# Patient Record
Sex: FEMALE | Race: WHITE | NOT HISPANIC OR LATINO | Employment: FULL TIME | URBAN - METROPOLITAN AREA
[De-identification: names, ages, dates, MRNs, and addresses within clinical notes are randomized per-mention and may not be internally consistent; named-entity substitution may affect disease eponyms.]

---
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
## 2020-01-02 ENCOUNTER — NURSE TRIAGE (OUTPATIENT)
Dept: NURSING | Facility: CLINIC | Age: 20
End: 2020-01-02

## 2020-01-02 NOTE — TELEPHONE ENCOUNTER
Girish asks if she can have her Implanon birth control implant replaced sooner than March 2019. She had it inserted in March 2017.    No current health concerns, states that she has more time in January or February to have implant replaced. [facility] advised her to call clinic during regular hours, and she verbalized understanding.    Vanessa Gaming RN/[facility] Nurse Advisor      Reason for Disposition    Wants to get birth control implant removed    Additional Information    Negative: [1] SEVERE pain (e.g., excruciating) AND [2] present > 1 hour    Negative: [1] Pregnant AND [2] MODERATE-SEVERE abdominal pain    Negative: [1] Pregnant AND [2] MODERATE-SEVERE vaginal bleeding    Negative: SEVERE vaginal bleeding (i.e., soaking 2 pads or tampons per hour and present 2 or more hours)    Negative: Patient sounds very sick or weak to the triager    Negative: [1] Constant abdominal pain AND [2] present > 2 hours    Negative: [1] Pregnant AND [2] MILD symptoms (e.g., vaginal spotting, mild abdominal cramping)    Negative: Caller has urgent question and triager unable to answer question    Negative: MODERATE vaginal bleeding (i.e., soaking 1 pad or tampon per hour and present > 6 hours)    Negative: Implant site looks infected (spreading redness, red streak, or pus)    Negative: Caller has NON-URGENT question and triager unable to answer question    Negative: Implant looks like it is coming out    Negative: [1] Pregnant AND [2] NO vaginal bleeding or abdominal pain    Negative: [1] Vaginal bleeding with > 6 soaked pads or tampons per day AND [2] lasts > 7 days    Negative: Caller is concerned about a sexually transmitted infection (STI)    Negative: [1] Vaginal bleeding with > 6 soaked pads or tampons per day BUT [2] lasts 7 days or less    Negative: Vaginal bleeding lasts > 7 days    Negative: [1] Has an Implanon or Nexplanon implant AND [2] more than 3 years since it was placed    Negative: [1] Has a Jadelle or Norplant  implant AND [2] more than 5 years since it was placed    Negative: Can't feel implant under the skin    Protocols used: CONTRACEPTION - IMPLANT-P-AH

## 2020-02-21 ENCOUNTER — OFFICE VISIT (OUTPATIENT)
Dept: FAMILY MEDICINE | Facility: CLINIC | Age: 20
End: 2020-02-21
Payer: COMMERCIAL

## 2020-02-21 VITALS
WEIGHT: 143.8 LBS | HEART RATE: 113 BPM | DIASTOLIC BLOOD PRESSURE: 78 MMHG | SYSTOLIC BLOOD PRESSURE: 144 MMHG | RESPIRATION RATE: 16 BRPM | BODY MASS INDEX: 26.46 KG/M2 | HEIGHT: 62 IN | TEMPERATURE: 98.9 F | OXYGEN SATURATION: 99 %

## 2020-02-21 DIAGNOSIS — Z30.46 SURVEILLANCE OF PREVIOUSLY PRESCRIBED IMPLANTABLE SUBDERMAL CONTRACEPTIVE: Primary | ICD-10-CM

## 2020-02-21 DIAGNOSIS — Z11.3 SCREEN FOR STD (SEXUALLY TRANSMITTED DISEASE): ICD-10-CM

## 2020-02-21 PROCEDURE — 87491 CHLMYD TRACH DNA AMP PROBE: CPT | Performed by: FAMILY MEDICINE

## 2020-02-21 PROCEDURE — 11983 REMOVE/INSERT DRUG IMPLANT: CPT | Performed by: FAMILY MEDICINE

## 2020-02-21 PROCEDURE — 87591 N.GONORRHOEAE DNA AMP PROB: CPT | Performed by: FAMILY MEDICINE

## 2020-02-21 ASSESSMENT — ANXIETY QUESTIONNAIRES
3. WORRYING TOO MUCH ABOUT DIFFERENT THINGS: NEARLY EVERY DAY
1. FEELING NERVOUS, ANXIOUS, OR ON EDGE: NEARLY EVERY DAY
IF YOU CHECKED OFF ANY PROBLEMS ON THIS QUESTIONNAIRE, HOW DIFFICULT HAVE THESE PROBLEMS MADE IT FOR YOU TO DO YOUR WORK, TAKE CARE OF THINGS AT HOME, OR GET ALONG WITH OTHER PEOPLE: EXTREMELY DIFFICULT
GAD7 TOTAL SCORE: 21
2. NOT BEING ABLE TO STOP OR CONTROL WORRYING: NEARLY EVERY DAY
6. BECOMING EASILY ANNOYED OR IRRITABLE: NEARLY EVERY DAY
7. FEELING AFRAID AS IF SOMETHING AWFUL MIGHT HAPPEN: NEARLY EVERY DAY
5. BEING SO RESTLESS THAT IT IS HARD TO SIT STILL: NEARLY EVERY DAY

## 2020-02-21 ASSESSMENT — MIFFLIN-ST. JEOR: SCORE: 1379.08

## 2020-02-21 ASSESSMENT — PATIENT HEALTH QUESTIONNAIRE - PHQ9
5. POOR APPETITE OR OVEREATING: NEARLY EVERY DAY
SUM OF ALL RESPONSES TO PHQ QUESTIONS 1-9: 19

## 2020-02-21 NOTE — PROGRESS NOTES
"  NEXPLANON REMOVAL/REINSERTION:    Is a pregnancy test required: No. Placed March 9, 2017  Was a consent obtained?  Yes    Subjective: Girish Calderón is a 19 year old No obstetric history on file. presents for Nexplanon.    Patient has been given the opportunity to ask questions about all forms of birth control, including all options appropriate for Girish Calderón. Discussed that no method of birth control, except abstinence is 100% effective against pregnancy or sexually transmitted infection.     Girish Calderón understands planning removal and reinsertion today    The entire removal and insertion procedure was reviewed with the patient, including care after placement.      BP (!) 144/78   Pulse 113   Temp 98.9  F (37.2  C) (Tympanic)   Resp 16   Ht 1.573 m (5' 1.91\")   Wt 65.2 kg (143 lb 12.8 oz)   LMP 02/03/2020 (Approximate)   SpO2 99%   BMI 26.38 kg/m      PROCEDURE NOTE: -- Nexplanon Removal/Insertion    Technique: On the left arm  Skin prep Betadine  Anesthesia 1% lidocaine, with epi  Procedure: Patient was placed supine with left arm exposed.  Implant located by palpitation and marked. Arm was prepped with Betadine. Incision point was anesthetized with 2 mL 1% lidocaine.     Small incision (<5mm) was made at distal end of palpable implant, curved hemostat or mosquito forceps was used to isolate the implant and bring it to the incision, the fibrous capsule containing the implant  was incised and the implant was removed intact.    After stretching the skin with thumb and index finger around the insertion site, skin punctured with the tip of the needle inserted at 30 degrees and then lowered to horizontal position. While lifting the skin with the tip of the needle, inserted the needle to its full length. Applicator was then stabilized and the slider was unlocked by pushing it slightly down. Slider moved back completely until it stopped. Applicator was then removed.    Correct placement of " the implant was confirmed by palpation in the patient's arm and visualizing the purple top of the obturator.   Bandage and pressure dressing applied to insertion site.    Lot # [id number]  Exp: 2022 Jan    EBL: minimal    Complications: none    ASSESSMENT:     ICD-10-CM    1. Surveillance of previously prescribed implantable subdermal contraceptive Z30.46 etonogestrel (NEXPLANON) subdermal implant 68 mg     etonogestrel 68 MG SC IMPL     REMOVAL AND REINSERTION NEXPLANON   2. Screen for STD (sexually transmitted disease) Z11.3 Chlamydia trachomatis PCR        PLAN:    Given 's handouts, including when to have Nexplanon removed, list of danger s/sx, side effects and follow up recommended. Encouraged condom use for prevention of STD. Back up contraception advised for 7 days. Advised to call for any fever, for prolonged or severe pain or bleeding, abnormal vaginal dischage. She was advised to use pain medications (ibuprofen) as needed for mild to moderate pain.     Jose Abdalla MD

## 2020-02-21 NOTE — PROGRESS NOTES
Subjective     Girish Calderón is a 19 year old female who presents to clinic today for the following health issues:    HPI   Chief Complaint   Patient presents with     Contraception     Nexplanon removal and replacment

## 2020-02-22 ASSESSMENT — ANXIETY QUESTIONNAIRES: GAD7 TOTAL SCORE: 21

## 2020-02-23 LAB
C TRACH DNA SPEC QL NAA+PROBE: NEGATIVE
N GONORRHOEA DNA SPEC QL NAA+PROBE: NEGATIVE
SPECIMEN SOURCE: NORMAL
SPECIMEN SOURCE: NORMAL

## 2020-07-06 ENCOUNTER — VIRTUAL VISIT (OUTPATIENT)
Dept: FAMILY MEDICINE | Facility: OTHER | Age: 20
End: 2020-07-06

## 2020-07-06 NOTE — PROGRESS NOTES
"Date: 2020 11:54:36  Clinician: Jerry Sy  Clinician NPI: [id number]  Patient: Girish Calderón  Patient : 2000  Patient Address: [address] [place] [address], [place] [address]  Patient Phone: [phone]  Visit Protocol: URI  Patient Summary:  Girish is a 20 year old ( : 2000 ) female who initiated a Visit for COVID-19 (Coronavirus) evaluation and screening. When asked the question \"Please sign me up to receive news, health information and promotions from [employer].\", Girish responded \"No\".    Girish states her symptoms started 1-2 days ago.   Her symptoms consist of diarrhea, malaise, a headache, myalgia, and nasal congestion. She is experiencing mild difficulty breathing with activities but can speak normally in full sentences.   Symptom details     Nasal secretions: The color of her mucus is blood-tinged and green.    Headache: She states the headache is moderate (4-6 on a 10 point pain scale).      Girish denies having wheezing, nausea, teeth pain, ageusia, vomiting, rhinitis, ear pain, chills, sore throat, anosmia, facial pain or pressure, fever, and cough. She also denies having recent facial or sinus surgery in the past 60 days, taking antibiotic medication in the past month, and having a sinus infection within the past year.   Precipitating events  She has not recently been exposed to someone with influenza. Girish has been in close contact with the following high risk individuals: people with asthma, heart disease or diabetes and immunocompromised people.   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   She does not work or volunteer as healthcare worker or a  and does not work or volunteer in a healthcare facility.   Girish has not lived in a congregate living setting in the past 14 days. She does not live with a healthcare worker.   Girsih has had a close contact with a " laboratory-confirmed COVID-19 patient within 14 days of symptom onset. She was not exposed at her work. Additional information about contact with COVID-19 (Coronavirus) patient as reported by the patient (free text): I was exposed to a  and member who tested positive at the gym I go to. On Monday 6/29/20   Pertinent medical history  Girish does not get yeast infections when she takes antibiotics.   Girish needs a return to work/school note.   Weight: 140 lbs   Girish does not smoke or use smokeless tobacco.   She denies pregnancy and denies breastfeeding. She has menstruated in the past month.   Weight: 140 lbs    MEDICATIONS: No current medications, ALLERGIES: NKDA  Clinician Response:  Dear Girish,   Your symptoms show that you may have coronavirus (COVID-19). This illness can cause fever, cough and trouble breathing. Many people get a mild case and get better on their own. Some people can get very sick.  What should I do?  We would like to test you for this virus.   1. Please call [phone] to schedule your visit. Explain that you were referred by [facility] to have a COVID-19 test. Be [facility].  The following will serve as your written order for this COVID Test, ordered by me, for the indication of suspected COVID [Z20.828]: The test will be ordered in [email or web address], our electronic health record, after you are scheduled. It will show as ordered and authorized by Erik Sheikh MD.  Order: COVID-19 (Coronavirus) PCR for SYMPTOMATIC testing from [facility].      2. When it's time for your COVID test:  Stay at least 6 feet away from others. (If someone will drive you to your test, stay in the backseat, as far away from the  as you can.)   Cover your mouth and nose with a mask, tissue or washcloth.  Go straight to the testing site. Don't make any stops on the way there or back.      3.Starting now: Stay home and away from others (self-isolate) until:   You've had no fever---and no  "medicine that reduces fever---for 3 full days (72 hours). And...   Your other symptoms have gotten better. For example, your cough or breathing has improved. And...   At least 10 days have passed since your symptoms started.       During this time, don't leave the house except for testing or medical care.   Stay in your own room, even for meals. Use your own bathroom if you can.   Stay away from others in your home. No hugging, kissing or shaking hands. No visitors.  Don't go to work, school or anywhere else.    Clean \"high touch\" surfaces often (doorknobs, counters, handles, etc.). Use a household cleaning spray or wipes. You'll find a full list of  on the EPA website: www.epa.gov/pesticide-registration/list-n-disinfectants-use-against-sars-cov-2.   Cover your mouth and nose with a mask, tissue or washcloth to avoid spreading germs.  Wash your hands and face often. Use soap and water.  Caregivers in these groups are at risk for severe illness due to COVID-19:  o People 65 years and older  o People who live in a nursing home or long-term care facility  o People with chronic disease (lung, heart, cancer, diabetes, kidney, liver, immunologic)  o People who have a weakened immune system, including those who:   Are in cancer treatment  Take medicine that weakens the immune system, such as corticosteroids  Had a bone marrow or organ transplant  Have an immune deficiency  Have poorly controlled HIV or AIDS  Are obese (body mass index of 40 or higher)  Smoke regularly   o Caregivers should wear gloves while washing dishes, handling laundry and cleaning bedrooms and bathrooms.  o Use caution when washing and drying laundry: Don't shake dirty laundry, and use the warmest water setting that you can.  o For more tips, go to www.cdc.gov/coronavirus/2019-ncov/downloads/10Things.pdf.    4.Sign up for [employer]. We know it's scary to hear that you might have COVID-19. We want to track your symptoms to make sure you're " okay over the next 2 weeks. Please look for an email from [employer]---this is a free, online program that we'll use to keep in touch. To sign up, follow the link in the email. Learn more at [email or web address]  How can I take care of myself?   Get lots of rest. Drink extra fluids (unless a doctor has told you not to).   Take Tylenol (acetaminophen) for fever or pain. If you have liver or kidney problems, ask your family doctor if it's okay to take Tylenol.   Adults can take either:    650 mg (two 325 mg pills) every 4 to 6 hours, or...   1,000 mg (two 500 mg pills) every 8 hours as needed.    Note: Don't take more than 3,000 mg in one day. Acetaminophen is found in many medicines (both prescribed and over-the-counter medicines). Read all labels to be sure you don't take too much.   For children, check the Tylenol bottle for the right dose. The dose is based on the child's age or weight.    If you have other health problems (like cancer, heart failure, an organ transplant or severe kidney disease): Call your specialty clinic if you don't feel better in the next 2 days.       Know when to call 911. Emergency warning signs include:    Trouble breathing or shortness of breath Pain or pressure in the chest that doesn't go away Feeling confused like you haven't felt before, or not being able to wake up Bluish-colored lips or face.  Where can I get more information?   [employer] -- About COVID-19: [email or web address]   CDC -- What to Do If You're Sick: www.cdc.gov/coronavirus/2019-ncov/about/steps-when-sick.html   CDC -- Ending Home Isolation: www.cdc.gov/coronavirus/2019-ncov/hcp/disposition-in-home-patients.html   CDC -- Caring for Someone: www.cdc.gov/coronavirus/2019-ncov/if-you-are-sick/care-for-someone.html   [facility] -- Interim Guidance for Hospital Discharge to Home: www.health.[facility].[place]./diseases/coronavirus/hcp/hospdischarge.pdf   [facility] clinical trials (COVID-19  research studies): clinicalaffairs.[facility]-clinical-trials    Below are the COVID-19 hotlines at the [place] Department of Health ([facility]). Interpreters are available.    For health questions: Call 415-304-0672 or 1-109.275.2222 (7 a.m. to 7 p.m.) For questions about schools and childcare: Call 984-669-0802 or 1-353.429.4148 (7 a.m. to 7 p.m.)    Diagnosis: Other malaise  Diagnosis ICD: R53.81

## 2020-07-07 DIAGNOSIS — Z20.828 CONTACT WITH OR EXPOSURE TO VIRAL DISEASE: Primary | ICD-10-CM

## 2020-07-07 PROCEDURE — U0003 INFECTIOUS AGENT DETECTION BY NUCLEIC ACID (DNA OR RNA); SEVERE ACUTE RESPIRATORY SYNDROME CORONAVIRUS 2 (SARS-COV-2) (CORONAVIRUS DISEASE [COVID-19]), AMPLIFIED PROBE TECHNIQUE, MAKING USE OF HIGH THROUGHPUT TECHNOLOGIES AS DESCRIBED BY CMS-2020-01-R: HCPCS | Performed by: FAMILY MEDICINE

## 2020-07-08 LAB
SARS-COV-2 RNA SPEC QL NAA+PROBE: NOT DETECTED
SPECIMEN SOURCE: NORMAL

## 2020-10-28 ENCOUNTER — OFFICE VISIT (OUTPATIENT)
Dept: FAMILY MEDICINE | Facility: CLINIC | Age: 20
End: 2020-10-28
Payer: COMMERCIAL

## 2020-10-28 VITALS
BODY MASS INDEX: 26.6 KG/M2 | RESPIRATION RATE: 16 BRPM | WEIGHT: 145 LBS | SYSTOLIC BLOOD PRESSURE: 154 MMHG | DIASTOLIC BLOOD PRESSURE: 86 MMHG | HEART RATE: 122 BPM | TEMPERATURE: 98.8 F

## 2020-10-28 DIAGNOSIS — R19.7 DIARRHEA, UNSPECIFIED TYPE: ICD-10-CM

## 2020-10-28 DIAGNOSIS — R10.84 GENERALIZED ABDOMINAL PAIN: ICD-10-CM

## 2020-10-28 DIAGNOSIS — Z80.0 FAMILY HISTORY OF COLON CANCER: ICD-10-CM

## 2020-10-28 DIAGNOSIS — R00.0 TACHYCARDIA: ICD-10-CM

## 2020-10-28 DIAGNOSIS — I10 HYPERTENSION, UNSPECIFIED TYPE: ICD-10-CM

## 2020-10-28 DIAGNOSIS — L21.9 SEBORRHEIC DERMATITIS: Primary | ICD-10-CM

## 2020-10-28 LAB
ALBUMIN SERPL-MCNC: 3.8 G/DL (ref 3.4–5)
ALP SERPL-CCNC: 59 U/L (ref 40–150)
ALT SERPL W P-5'-P-CCNC: 23 U/L (ref 0–50)
ANION GAP SERPL CALCULATED.3IONS-SCNC: 6 MMOL/L (ref 3–14)
AST SERPL W P-5'-P-CCNC: 48 U/L (ref 0–45)
BASOPHILS # BLD AUTO: 0.1 10E9/L (ref 0–0.2)
BASOPHILS NFR BLD AUTO: 0.7 %
BILIRUB SERPL-MCNC: 0.3 MG/DL (ref 0.2–1.3)
BUN SERPL-MCNC: 7 MG/DL (ref 7–30)
CALCIUM SERPL-MCNC: 8.9 MG/DL (ref 8.5–10.1)
CHLORIDE SERPL-SCNC: 106 MMOL/L (ref 94–109)
CO2 SERPL-SCNC: 25 MMOL/L (ref 20–32)
CREAT SERPL-MCNC: 0.7 MG/DL (ref 0.52–1.04)
DIFFERENTIAL METHOD BLD: NORMAL
EOSINOPHIL # BLD AUTO: 0.1 10E9/L (ref 0–0.7)
EOSINOPHIL NFR BLD AUTO: 0.7 %
ERYTHROCYTE [DISTWIDTH] IN BLOOD BY AUTOMATED COUNT: 12.2 % (ref 10–15)
GFR SERPL CREATININE-BSD FRML MDRD: >90 ML/MIN/{1.73_M2}
GLUCOSE SERPL-MCNC: 82 MG/DL (ref 70–99)
HCT VFR BLD AUTO: 40.5 % (ref 35–47)
HGB BLD-MCNC: 13.7 G/DL (ref 11.7–15.7)
LYMPHOCYTES # BLD AUTO: 1.8 10E9/L (ref 0.8–5.3)
LYMPHOCYTES NFR BLD AUTO: 24.3 %
MCH RBC QN AUTO: 30.2 PG (ref 26.5–33)
MCHC RBC AUTO-ENTMCNC: 33.8 G/DL (ref 31.5–36.5)
MCV RBC AUTO: 89 FL (ref 78–100)
MONOCYTES # BLD AUTO: 0.8 10E9/L (ref 0–1.3)
MONOCYTES NFR BLD AUTO: 10.6 %
NEUTROPHILS # BLD AUTO: 4.6 10E9/L (ref 1.6–8.3)
NEUTROPHILS NFR BLD AUTO: 63.7 %
PLATELET # BLD AUTO: 303 10E9/L (ref 150–450)
POTASSIUM SERPL-SCNC: 3.8 MMOL/L (ref 3.4–5.3)
PROT SERPL-MCNC: 7.3 G/DL (ref 6.8–8.8)
RBC # BLD AUTO: 4.53 10E12/L (ref 3.8–5.2)
SODIUM SERPL-SCNC: 137 MMOL/L (ref 133–144)
TSH SERPL DL<=0.005 MIU/L-ACNC: 2.44 MU/L (ref 0.4–4)
WBC # BLD AUTO: 7.3 10E9/L (ref 4–11)

## 2020-10-28 PROCEDURE — 99214 OFFICE O/P EST MOD 30 MIN: CPT | Performed by: FAMILY MEDICINE

## 2020-10-28 PROCEDURE — 36415 COLL VENOUS BLD VENIPUNCTURE: CPT | Performed by: FAMILY MEDICINE

## 2020-10-28 PROCEDURE — 80050 GENERAL HEALTH PANEL: CPT | Performed by: FAMILY MEDICINE

## 2020-10-28 PROCEDURE — 93000 ELECTROCARDIOGRAM COMPLETE: CPT | Performed by: FAMILY MEDICINE

## 2020-10-28 RX ORDER — KETOCONAZOLE 20 MG/ML
SHAMPOO TOPICAL DAILY PRN
Qty: 120 ML | Refills: 1 | Status: SHIPPED | OUTPATIENT
Start: 2020-10-28 | End: 2021-07-16

## 2020-10-28 ASSESSMENT — ENCOUNTER SYMPTOMS
SORE THROAT: 0
DIARRHEA: 1
SHORTNESS OF BREATH: 0
HEADACHES: 0
ADENOPATHY: 0
FEVER: 0
BRUISES/BLEEDS EASILY: 0
ABDOMINAL PAIN: 1
UNEXPECTED WEIGHT CHANGE: 0
CHILLS: 0
FATIGUE: 1
RHINORRHEA: 0
BLOOD IN STOOL: 0

## 2020-10-28 NOTE — PATIENT INSTRUCTIONS
Patient Education     Understanding Seborrheic Dermatitis    Seborrheic dermatitis is a common type of rash that causes red, scaly, greasy skin. It occurs on skin that has oil glands. These include the face, upper chest, and scalp, where it is often called dandruff. It tends to last a long time, or go away and come back. Seborrheic dermatitis is not spread from person to person.   How to say it  [id number]  What causes seborrheic dermatitis?  Experts don't know what causes it. It may be partly caused by a type of yeast that grows on skin, along with extra oil production. Experts are still learning more. It s more common in men than women, and it can occur at any age. It happens more often in people with HIV/AIDS, Parkinson disease, alcoholic pancreatitis, hepatitis, or cancer. It can also get worse during times of stress.  Symptoms of seborrheic dermatitis  Symptoms can include skin that is:    Bumpy    Covered with yellow scales or crusts    Cracked    Greasy    Itchy    Leaking fluid    Painful    Red or orange  These symptoms can occur on skin:    Around the nose    Behind the ears    In the beard    In the eyebrows    On the scalp, also known as dandruff    On the upper chest  You may also have acne, inflamed eyelids (blepharitis), or other skin conditions at the same time.  Treatment for seborrheic dermatitis  Treatment can reduce symptoms for a period of time. The types of treatments most often used include:    Antifungal shampoo, body wash, or cream. These contain medicines such as ketoconazole, fluconazole, selenium sulfide, ciclopirox, pyrithione zinc, or tea tree oil.    Corticosteroid cream or ointment. These contain medicines such as hydrocortisone.    Calcineurin inhibitor cream or ointment. These contain medicines such as pimecrolimus or tacrolimus.    Shampoo or cream with other medicines. These contain medicines such as coal tar, salicylic acid, or zinc pyrithione.    Sodium  sulfacetemide creams and washes. These may also help.  In some cases one treatment will stop working after a while. Switching between treatments every few months may be helpful.   Wash your skin gently. You can remove scales with oil and gentle rubbing or a brush.  Living with seborrheic dermatitis  Seborrheic dermatitis is an ongoing (chronic) condition. It can go away and then come back. You will likely need to use shampoo, cream, or ointment with medicine once or twice a week. This can help to keep symptoms from coming back or getting worse.  When to call your healthcare provider  Call your healthcare provider right away if you have any of these:    Symptoms that don t get better, or get worse    New symptoms  Date Last Reviewed: 5/1/2016 2000-2019 [employer] [address] [place] [address], [place] [address]. All rights reserved. This information is not intended as a substitute for professional medical care. Always follow your healthcare professional's instructions.

## 2020-10-28 NOTE — PROGRESS NOTES
SUBJECTIVE   Girish Calderón is a 20 year old female who presents with     Rash      Duration: started this fall with weather change    Description  Location: forehead, cheeks  Itching: moderate    Intensity:  mild    Accompanying signs and symptoms: flakey skin, pruritic, also in scalp    History (similar episodes/previous evaluation): has used vanicream, eczema lotions    Precipitating or alleviating factors:  New exposures:  None  Recent travel: no      Therapies tried and outcome: vanicream    Patient is a generally healthy 20 year old female with past medical history of eczema with topical triamcinolone, has Nexplanon in place. She presents for a few issues today.     1. Rash: see above.   She has had very dry skin on her forehead and worse on the scalp. She has tried vanicream, which has not significantly improved symptoms. Also notes a burning sensation during dry/cold temperatures.     2. Abdominal pain/family history of colon cancer:  Patient's mother was diagnosed with metastatic colon cancer at age 44,  just after her 45th birthday. Maternal grandmother had colon cancer,  at 61. Also has history of colon cancer on father's side with an uncle, and deaths in the 60's of both grandparents from colon cancer. She experiences intermittent sharp lower abdominal pain. Also had diarrhea. Denies any bloody or tarry stools.      3. Elevated blood pressure and fast heart rate.   Patient's mother had a history of htn. Patient has been checking her blood pressure intermittently at home and has noticed it is elevated (does not report numbers). She also notices she will become tachycardic to the 180-190's during exercise (she does weight training and DION for exercise). She sometimes will feel lightheaded, which improves with rest. She denies any headaches (no recent migraines), vision changes, chest pain, SOB. Does experience palpitations with exercise. She has not had any episodes of LOC, and no family  history of sudden cardiac death. Of note, she does use Orgain protein powder, about once a week.       BP Readings from Last 6 Encounters:   10/28/20 (!) 154/86   02/21/20 (!) 144/78   11/18/19 128/76   10/25/19 124/68   09/10/19 124/80   02/19/19 110/70       PCP   Angela Boyd PA-C [phone]    Health Maintenance        Health Maintenance Due   Topic Date Due     DEPRESSION ACTION PLAN  2000     HIV SCREENING  04/24/2015     HEPATITIS C SCREENING  04/24/2018     PREVENTIVE CARE VISIT  01/17/2019     PHQ-9  08/21/2020     INFLUENZA VACCINE (1) 09/01/2020       HPI        Patient Active Problem List   Diagnosis     Generalized anxiety disorder     Severe major depression without psychotic features (H)     Migraine with aura and without status migrainosus, not intractable     Nexplanon insertion     Current Outpatient Medications   Medication     clotrimazole (LOTRIMIN) 1 % external cream     emollient (VANICREAM) external cream     etonogestrel (IMPLANON/NEXPLANON) 68 MG IMPL     etonogestrel 68 MG SC IMPL     fluticasone (FLONASE) 50 MCG/ACT spray     IBUPROFEN PO     triamcinolone (KENALOG) 0.1 % external cream     No current facility-administered medications for this visit.        Patient Active Problem List   Diagnosis     Generalized anxiety disorder     Severe major depression without psychotic features (H)     Migraine with aura and without status migrainosus, not intractable     Nexplanon insertion     Past Surgical History:   Procedure Laterality Date     MYRINGOTOMY, INSERT TUBE, COMBINED  5/16/2011    Procedure:COMBINED MYRINGOTOMY, INSERT TUBE; Surgeon:GERARDO MATUTE; Location:[facility]     MYRINGOTOMY, INSERT TUBE, COMBINED Left 9/10/2014    Procedure: COMBINED MYRINGOTOMY, INSERT TUBE;  Surgeon: Gerardo Matute MD;  Location: [facility]       Social History     Tobacco Use     Smoking status: Passive Smoke Exposure - Never Smoker     Smokeless tobacco: Never Used     Tobacco  comment: father smokes outside   Substance Use Topics     Alcohol use: No     Family History   Problem Relation Age of Onset     Hypertension Mother      Lung Cancer Mother         approx age 45     Colon Cancer Mother      Other Cancer Mother         liver     Cancer - colorectal Maternal Grandmother      Breast Cancer Paternal Grandmother      Depression Paternal Grandmother      Prostate Cancer Paternal Grandfather      Heart Disease Paternal Grandfather      Depression Paternal Grandfather      Heart Disease Paternal Uncle 52        heart attack      Heart Disease Maternal Grandfather      Depression Father      Anxiety Disorder Sister      C.A.D. No family hx of      Diabetes No family hx of      Cerebrovascular Disease No family hx of          Current Outpatient Medications   Medication Sig Dispense Refill     emollient (VANICREAM) external cream Apply topically as needed for other (eczema) 453 g 0     etonogestrel (IMPLANON/NEXPLANON) 68 MG IMPL 1 each (68 mg) by Subdermal route continuous  0     etonogestrel 68 MG SC IMPL 1 each (68 mg) by Subdermal route once       IBUPROFEN PO Take 200 mg by mouth as needed for moderate pain       ketoconazole (NIZORAL) 2 % external shampoo Apply topically daily as needed for itching or irritation 120 mL 1     triamcinolone (KENALOG) 0.1 % external cream Apply topically 2 times daily 80 g 0     clotrimazole (LOTRIMIN) 1 % external cream Apply topically 2 times daily (Patient not taking: Reported on 10/28/2020) 30 g 0     fluticasone (FLONASE) 50 MCG/ACT spray Spray 1-2 sprays into both nostrils daily (Patient not taking: Reported on 9/6/2018) 3 Bottle 1     Allergies   Allergen Reactions     Nka [No Known Allergies]      Soap Rash     Patient states she has sensitive skin and gets rashes from scented soaps.      BP Readings from Last 3 Encounters:   10/28/20 (!) 154/86   02/21/20 (!) 144/78   11/18/19 128/76    Wt Readings from Last 3 Encounters:   10/28/20 65.8 kg (145  lb)   02/21/20 65.2 kg (143 lb 12.8 oz) (74 %, Z= 0.64)*   11/18/19 63 kg (139 lb) (69 %, Z= 0.48)*     * Growth percentiles are based on [facility] (Girls, 2-20 Years) data.                    Reviewed and updated:  Tobacco  Allergies  Meds  Med Hx  Surg Hx  Fam Hx  Soc Hx     ROS:  Review of Systems   Constitutional: Positive for fatigue (sometimes, not daily). Negative for chills, fever and unexpected weight change.   HENT: Negative for congestion, ear pain, rhinorrhea and sore throat.    Eyes: Negative for visual disturbance.   Respiratory: Negative for shortness of breath.    Cardiovascular: Negative for chest pain.   Gastrointestinal: Positive for abdominal pain (intermittent excrutiating pain, lower abdomen into the back) and diarrhea. Negative for blood in stool.        Heartburn nearly daily, bloating   Endocrine: Negative for cold intolerance and heat intolerance.   Skin: Positive for rash.   Allergic/Immunologic: Negative for environmental allergies and food allergies.   Neurological: Negative for headaches.   Hematological: Negative for adenopathy. Does not bruise/bleed easily.       PHYSICAL EXAM   There were no vitals taken for this visit.  There is no height or weight on file to calculate BMI.  GENERAL: healthy, alert and no distress  EYES: Eyes grossly normal to inspection, PERRL and conjunctivae and sclerae normal  HENT: ear canals and TM's normal, nose and mouth without ulcers or lesions  NECK: no adenopathy, no asymmetry, masses, or scars and thyroid normal to palpation  RESP: lungs clear to auscultation - no rales, rhonchi or wheezes  BREAST: normal without masses, tenderness or nipple discharge and no palpable axillary masses or adenopathy  CV: regular rhythm, Tachycardic. normal S1 S2, no S3 or S4, no murmur, click or rub, no peripheral edema and peripheral pulses strong  ABDOMEN: soft, nontender, no hepatosplenomegaly, no masses and bowel sounds normal  MS: no gross musculoskeletal defects  "noted, no edema  SKIN: no suspicious lesions or rashes  NEURO: Normal strength and tone, mentation intact and speech normal  PSYCH: mentation appears normal, affect normal/bright      Assessment & Plan     Girish was seen today for derm problem.    Diagnoses and all orders for this visit:    Seborrheic dermatitis  Patient has very dry skin on her scalp and forehead.   -     ketoconazole (NIZORAL) 2 % external shampoo; Apply topically daily as needed for itching or irritation  -     DERMATOLOGY ADULT REFERRAL; Future    Tachycardia  Hypertension, unspecified type  Differentials discussed in detail including primary versus secondary etiology.  CBC, CMP, TSH ordered.  Patient denies using any illicit drugs.  EKG showed sinus tachycardia.  Recommended to avoid using protein supplement and continue well hydration, balanced diet.  .Cardiology referral placed for further review and recommendations  -     TSH with free T4 reflex  -     CBC with platelets and differential  -     Comprehensive metabolic panel (BMP + Alb, Alk Phos, ALT, AST, Total. Bili, TP)  -     CARDIOLOGY EVAL ADULT REFERRAL; Future  -     EKG 12-lead complete w/read - Clinics    Generalized abdominal pain  Diarrhea, unspecified type  Family history of colon cancer  Suspect symptoms likely related to irritable bowel syndrome.  Gastroenterology referral placed  -     GASTROENTEROLOGY ADULT REF CONSULT ONLY; Future         BMI:   Estimated body mass index is 26.6 kg/m  as calculated from the following:    Height as of 2/21/20: 1.573 m (5' 1.91\").    Weight as of this encounter: 65.8 kg (145 lb).   Weight management plan: patient has been very active, suspect she has elevated BMI due to muscle mass         Patient Instructions   Patient Education     Understanding Seborrheic Dermatitis    Seborrheic dermatitis is a common type of rash that causes red, scaly, greasy skin. It occurs on skin that has oil glands. These include the face, upper chest, and scalp, " where it is often called dandruff. It tends to last a long time, or go away and come back. Seborrheic dermatitis is not spread from person to person.   How to say it  [id number]  What causes seborrheic dermatitis?  Experts don't know what causes it. It may be partly caused by a type of yeast that grows on skin, along with extra oil production. Experts are still learning more. It s more common in men than women, and it can occur at any age. It happens more often in people with HIV/AIDS, Parkinson disease, alcoholic pancreatitis, hepatitis, or cancer. It can also get worse during times of stress.  Symptoms of seborrheic dermatitis  Symptoms can include skin that is:    Bumpy    Covered with yellow scales or crusts    Cracked    Greasy    Itchy    Leaking fluid    Painful    Red or orange  These symptoms can occur on skin:    Around the nose    Behind the ears    In the beard    In the eyebrows    On the scalp, also known as dandruff    On the upper chest  You may also have acne, inflamed eyelids (blepharitis), or other skin conditions at the same time.  Treatment for seborrheic dermatitis  Treatment can reduce symptoms for a period of time. The types of treatments most often used include:    Antifungal shampoo, body wash, or cream. These contain medicines such as ketoconazole, fluconazole, selenium sulfide, ciclopirox, pyrithione zinc, or tea tree oil.    Corticosteroid cream or ointment. These contain medicines such as hydrocortisone.    Calcineurin inhibitor cream or ointment. These contain medicines such as pimecrolimus or tacrolimus.    Shampoo or cream with other medicines. These contain medicines such as coal tar, salicylic acid, or zinc pyrithione.    Sodium sulfacetemide creams and washes. These may also help.  In some cases one treatment will stop working after a while. Switching between treatments every few months may be helpful.   Wash your skin gently. You can remove scales with oil and  gentle rubbing or a brush.  Living with seborrheic dermatitis  Seborrheic dermatitis is an ongoing (chronic) condition. It can go away and then come back. You will likely need to use shampoo, cream, or ointment with medicine once or twice a week. This can help to keep symptoms from coming back or getting worse.  When to call your healthcare provider  Call your healthcare provider right away if you have any of these:    Symptoms that don t get better, or get worse    New symptoms  Date Last Reviewed: 5/1/2016 2000-2019 [employer]. [address] [place] [address]. All rights reserved. This information is not intended as a substitute for professional medical care. Always follow your healthcare professional's instructions.                   Bin Gan MD  [facility]

## 2020-10-28 NOTE — NURSING NOTE
"Chief Complaint   Patient presents with     Derm Problem     BP (!) 154/86   Pulse 122   Temp 98.8  F (37.1  C) (Tympanic)   Resp 16   Wt 65.8 kg (145 lb)   LMP  (LMP Unknown)   BMI 26.60 kg/m   Estimated body mass index is 26.6 kg/m  as calculated from the following:    Height as of 2/21/20: 1.573 m (5' 1.91\").    Weight as of this encounter: 65.8 kg (145 lb).  Patient presents to the clinic using No DME          "

## 2020-10-29 ENCOUNTER — OFFICE VISIT (OUTPATIENT)
Dept: DERMATOLOGY | Facility: CLINIC | Age: 20
End: 2020-10-29
Payer: COMMERCIAL

## 2020-10-29 VITALS — HEART RATE: 120 BPM | DIASTOLIC BLOOD PRESSURE: 87 MMHG | OXYGEN SATURATION: 99 % | SYSTOLIC BLOOD PRESSURE: 137 MMHG

## 2020-10-29 DIAGNOSIS — L30.0 NUMMULAR DERMATITIS: Primary | ICD-10-CM

## 2020-10-29 DIAGNOSIS — L21.9 SEBORRHEIC DERMATITIS: ICD-10-CM

## 2020-10-29 PROCEDURE — 99203 OFFICE O/P NEW LOW 30 MIN: CPT | Performed by: PHYSICIAN ASSISTANT

## 2020-10-29 RX ORDER — HYDROCORTISONE 2.5 %
CREAM (GRAM) TOPICAL
Qty: 30 G | Refills: 5 | Status: SHIPPED | OUTPATIENT
Start: 2020-10-29 | End: 2021-07-16

## 2020-10-29 RX ORDER — FLUOCINONIDE TOPICAL SOLUTION USP, 0.05% 0.5 MG/ML
SOLUTION TOPICAL
Qty: 50 ML | Refills: 3 | Status: SHIPPED | OUTPATIENT
Start: 2020-10-29 | End: 2021-07-16

## 2020-10-29 NOTE — PATIENT INSTRUCTIONS
For the scalp, this is seborrheic dermatitis     -Use the ketoconazole shampoo daily as needed     -Fluocinonide solution to stubborn areas 1-2 times daily for 1-2 weeks at a time.     For the face  -Use hydrocortisone cream 1-2 times per day for 5-7 days at a time when needed.     For the eczema    -Use the triamcinolone cream 1-2 times per day when need  -Vanicream everywhere every day

## 2020-10-29 NOTE — PROGRESS NOTES
HPI:   Chief complaints: Girish Calderón is a 20 year old female who presents for evaluation of flaking and very dry skin on the scalp and face. This has been present for several months. She has tried numerous OTC shampoos but they have not helped. She notices flaking on the face around the nose and inner cheeks.     Additional concern: very itchy patch on the leg. She had more spots previously, all very itchy. She does have a history of eczema. She has used triamcinolone cream on the spots which does help.       Review Of Systems  Eyes: negative  Ears/Nose/Throat: negative  Respiratory: No shortness of breath, dyspnea on exertion, cough, or hemoptysis  Cardiovascular: negative  Gastrointestinal: negative  Genitourinary: negative  Musculoskeletal: negative  Neurologic: negative  Psychiatric: negative  Skin: positive for rash      PHYSICAL EXAM:    /87   Pulse 120   LMP  (LMP Unknown)   SpO2 99%   Skin exam performed as follows: Type 2 skin. Mood appropriate  Alert and Oriented X 3. Well developed, well nourished in no distress.  General appearance: Normal  Head including face: Normal  Eyes: conjunctiva and lids: Normal  Mouth: Lips, teeth, gums: Normal  Neck: Normal  Chest-breast/axillae: Normal  Back: Normal  Spleen and liver: Normal  Cardiovascular: Exam of peripheral vascular system by observation for swelling, varicosities, edema: Normal  Genitalia: groin, buttocks: Normal  Extremities: digits/nails (clubbing): Normal  Eccrine and Apocrine glands: Normal  Right upper extremity: Normal  Left upper extremity: Normal  Right lower extremity: Normal  Left lower extremity: Normal  Skin: Scalp and body hair: See below    1. Flaking and erythema on the scalp and bilateral ala  2. Annular dermatitis on the right lower leg     ASSESSMENT/PLAN:     Seborrheic dermatitis - advised on chronic, recurrent condition. Discussed that it is a reaction to the normal yeast on the skin.   --Start ketoconazole shampoo  daily as needed  --Lidex for stubborn areas on the scalp  --HC 2.5% cream BID x 5-7 days PRN for areas on the face.     2.  Nummular dermatitis - advised on diagnosis and treatment options.   --TAC BID x 1-2 weeks PRN  --Thick emollients daily        Follow-up: PRN  CC:   Scribed By: Sue Izquierdo, MS, PA-C

## 2020-10-29 NOTE — NURSING NOTE
Chief Complaint   Patient presents with     Derm Problem     dry face, scalp & spot on leg       Vitals:    10/29/20 1527   BP: 137/87   Pulse: 120   SpO2: 99%     Wt Readings from Last 1 Encounters:   10/28/20 65.8 kg (145 lb)       Alma Mendoza LPN.................10/29/2020

## 2020-10-29 NOTE — LETTER
10/29/2020         RE: Girish Calderón  [address] [facility] [address]        Dear Colleague,    Thank you for referring your patient, Girish Calderón, to the [facility]. Please see a copy of my visit note below.    HPI:   Chief complaints: Girish Calderón is a 20 year old female who presents for evaluation of flaking and very dry skin on the scalp and face. This has been present for several months. She has tried numerous OTC shampoos but they have not helped. She notices flaking on the face around the nose and inner cheeks.     Additional concern: very itchy patch on the leg. She had more spots previously, all very itchy. She does have a history of eczema. She has used triamcinolone cream on the spots which does help.       Review Of Systems  Eyes: negative  Ears/Nose/Throat: negative  Respiratory: No shortness of breath, dyspnea on exertion, cough, or hemoptysis  Cardiovascular: negative  Gastrointestinal: negative  Genitourinary: negative  Musculoskeletal: negative  Neurologic: negative  Psychiatric: negative  Skin: positive for rash      PHYSICAL EXAM:    /87   Pulse 120   LMP  (LMP Unknown)   SpO2 99%   Skin exam performed as follows: Type 2 skin. Mood appropriate  Alert and Oriented X 3. Well developed, well nourished in no distress.  General appearance: Normal  Head including face: Normal  Eyes: conjunctiva and lids: Normal  Mouth: Lips, teeth, gums: Normal  Neck: Normal  Chest-breast/axillae: Normal  Back: Normal  Spleen and liver: Normal  Cardiovascular: Exam of peripheral vascular system by observation for swelling, varicosities, edema: Normal  Genitalia: groin, buttocks: Normal  Extremities: digits/nails (clubbing): Normal  Eccrine and Apocrine glands: Normal  Right upper extremity: Normal  Left upper extremity: Normal  Right lower extremity: Normal  Left lower extremity: Normal  Skin: Scalp and body hair: See below    1. Flaking and erythema on the scalp  and bilateral ala  2. Annular dermatitis on the right lower leg     ASSESSMENT/PLAN:     Seborrheic dermatitis - advised on chronic, recurrent condition. Discussed that it is a reaction to the normal yeast on the skin.   --Start ketoconazole shampoo daily as needed  --Lidex for stubborn areas on the scalp  --HC 2.5% cream BID x 5-7 days PRN for areas on the face.     2.  Nummular dermatitis - advised on diagnosis and treatment options.   --TAC BID x 1-2 weeks PRN  --Thick emollients daily        Follow-up: PRN  CC:   Scribed By: Sue Izquierdo, MS, PAAdalgisaC          Again, thank you for allowing me to participate in the care of your patient.        Sincerely,        Sue Izquierdo PA-C

## 2020-11-16 ENCOUNTER — HEALTH MAINTENANCE LETTER (OUTPATIENT)
Age: 20
End: 2020-11-16

## 2020-12-29 ENCOUNTER — NURSE TRIAGE (OUTPATIENT)
Dept: NURSING | Facility: CLINIC | Age: 20
End: 2020-12-29

## 2020-12-29 ENCOUNTER — E-VISIT (OUTPATIENT)
Dept: URGENT CARE | Facility: CLINIC | Age: 20
End: 2020-12-29
Payer: COMMERCIAL

## 2020-12-29 DIAGNOSIS — J02.9 SORE THROAT: ICD-10-CM

## 2020-12-29 DIAGNOSIS — Z20.822 SUSPECTED COVID-19 VIRUS INFECTION: ICD-10-CM

## 2020-12-29 PROCEDURE — 99421 OL DIG E/M SVC 5-10 MIN: CPT | Performed by: EMERGENCY MEDICINE

## 2020-12-29 NOTE — PATIENT INSTRUCTIONS
Dear Girish Calderón,    Your symptoms show that you may have coronavirus (COVID-19). This illness can cause fever, cough and trouble breathing. Many people get a mild case and get better on their own. Some people can get very sick.    Because you also reported sore throat I would like to also test you for Strep Throat to determine if we need to treat you for that as well.    What should I do?  We would like to test you for Covid-19 virus and Strep Throat. I have placed orders for these tests.     For all employees or close contacts (except [facility] and [facility] - see below), go to your [employer] home page and scroll down to the section that says  You have an appointment that needs to be scheduled  and click the large green button that says  Schedule Now  and follow the steps to find the next available opening.  It is important that when you are asked what the reason for your appointment is that you mention you need BOTH Covid and Strep tests.  tests.     If you are unable to complete these steps or if you cannot find any available times, please call [phone] to schedule employee testing.     [facility] employees or close contacts, please call [phone].   [facility] ([facility]) employees or close contacts call [phone].    Return to work/school/ guidance:  Please let your workplace manager and staffing office know when your quarantine ends     We can t give you an exact date as it depends on the above. You can calculate this on your own or work with your manager/staffing office to calculate this. (For example if you were exposed on 10/4, you would have to quarantine for 14 full days. That would be through 10/18. You could return on 10/19.)      If you receive a positive COVID-19 test result, follow the guidance of the those who are giving you the results. Usually the return to work is 10 (or in some cases 20 days from symptom onset.) If you work at [employer], you must also be cleared by  Employee Occupational Health and Safety to return to work.        If you receive a negative COVID-19 test result and did not have a high risk exposure to someone with a known positive COVID-19 test, you can return to work once you're free of fever for 24 hours without fever-reducing medication and your symptoms are improving or resolved.      If you receive a negative COVID-19 test and If you had a high risk exposure to someone who has tested positive for COVID-19 then you can return to work 14 days after your last contact with the positive individual    Note: If you have ongoing exposure to the covid positive person, this quarantine period may be more than 14 days. (For example, if you are continued to be exposed to your child who tested positive and cannot isolate from them, then the quarantine of 7-14 days can't start until your child is no longer contagious. This is typically 10 days from onset of the child's symptoms. So the total duration may be 17-24 days in this case.)    Sign up for [employer].   We know it's scary to hear that you might have COVID-19. We want to track your symptoms to make sure you're okay over the next 2 weeks. Please look for an email from [employer]--this is a free, online program that we'll use to keep in touch. To sign up, follow the link in the email you will receive. Learn more at [email or web address]    How can I take care of myself?    Get lots of rest. Drink extra fluids (unless a doctor has told you not to)    Take Tylenol (acetaminophen) or ibuprofen for fever or pain. If you have liver or kidney problems, ask your family doctor if it's okay to take Tylenol o ibuprofen    If you have other health problems (like cancer, heart failure, an organ transplant or severe kidney disease): Call your specialty clinic if you don't feel better in the next 2 days.    Know when to call 911. Emergency warning signs include:  o Trouble breathing or shortness of breath  o Pain  or pressure in the chest that doesn't go away  o Feeling confused like you haven't felt before, or not being able to wake up  o Bluish-colored lips or face    Where can I get more information?  [facility] - About COVID-19:   [email or web address]    CDC - What to Do If You're Sick:   www.cdc.gov/coronavirus/2019-ncov/about/steps-when-sick.html      Dear Girish Calderón,    Your symptoms show that you may have coronavirus (COVID-19). This illness can cause fever, cough and trouble breathing. Many people get a mild case and get better on their own. Some people can get very sick.    Because you also reported sore throat I would like to also test you for Strep Throat to determine if we need to treat you for that as well.    What should I do?  We would like to test you for Covid-19 virus and Strep Throat. I have placed orders for these tests.   To schedule: go to your [employer] home page and scroll down to the section that says  You have an appointment that needs to be scheduled  and click the large green button that says  Schedule Now  and follow the steps to find the next available openings. It is important that when you are asked what the reason for your appointment is that you mention you need BOTH Covid and Strep tests.    If you are unable to complete these [employer] scheduling steps, please call [phone] to schedule your testing.     Return to work/school/ guidance:   Please let your workplace manager and staffing office know when your quarantine ends     We can t give you an exact date as it depends on the above. You can calculate this on your own or work with your manager/staffing office to calculate this. (For example if you were exposed on 10/4, you would have to quarantine for 14 full days. That would be through 10/18. You could return on 10/19.)      If you receive a positive COVID-19 test result, follow the guidance of the those who are giving you the results. Usually the return  to work is 10 (or in some cases 20 days from symptom onset.) If you work at [employer], you must also be cleared by Employee Occupational Health and Safety to return to work.        If you receive a negative COVID-19 test result and did not have a high risk exposure to someone with a known positive COVID-19 test, you can return to work once you're free of fever for 24 hours without fever-reducing medication and your symptoms are improving or resolved.      If you receive a negative COVID-19 test and If you had a high risk exposure to someone who has tested positive for COVID-19 then you can return to work 14 days after your last contact with the positive individual    Note: If you have ongoing exposure to the covid positive person, this quarantine period may be more than 14 days. (For example, if you are continued to be exposed to your child who tested positive and cannot isolate from them, then the quarantine of 7-14 days can't start until your child is no longer contagious. This is typically 10 days from onset of the child's symptoms. So the total duration may be 17-24 days in this case.)    Sign up for [employer].   We know it's scary to hear that you might have COVID-19. We want to track your symptoms to make sure you're okay over the next 2 weeks. Please look for an email from [employer]--this is a free, online program that we'll use to keep in touch. To sign up, follow the link in the email you will receive. Learn more at [email or web address]    How can I take care of myself?    Get lots of rest. Drink extra fluids (unless a doctor has told you not to)    Take Tylenol (acetaminophen) or ibuprofen for fever or pain. If you have liver or kidney problems, ask your family doctor if it's okay to take Tylenol o ibuprofen    If you have other health problems (like cancer, heart failure, an organ transplant or severe kidney disease): Call your specialty clinic if you don't feel better in the  next 2 days.    Know when to call 911. Emergency warning signs include:  o Trouble breathing or shortness of breath  o Pain or pressure in the chest that doesn't go away  o Feeling confused like you haven't felt before, or not being able to wake up  o Bluish-colored lips or face    Where can I get more information?  [facility] - About COVID-19:   [email or web address]    CDC - What to Do If You're Sick:   www.cdc.gov/coronavirus/2019-ncov/about/steps-when-sick.html    December 29, 2020  RE:  Girish Calderón                                                                                                                  [id number] [facility] [address]      To whom it may concern:    I evaluated Girish Calderón on December 29, 2020. Girish Calderón should be excused from work/school.     They should let their workplace manager and staffing office know when their quarantine ends.    We can not give an exact date as it depends on the information below. They can calculate this on their own or work with their manager/staffing office to calculate this. (For example if they were exposed on 10/04, they would have to quarantine for 14 full days. That would be through 10/18. They could return on 10/19.)    Quarantine Guidelines:      If patient receives a positive COVID-19 test result, they should follow the guidance of those who are giving the results. Usually the return to work is 10 (or in some cases 20 days from symptom onset.) If they work at [facility] [employer], they must be cleared by Employee Occupational Health and Safety to return to work.        If patient receives a negative COVID-19 test result and did not have a high risk exposure to someone with a known positive COVID-19 test, they can return to work once they're free of fever for 24 hours without fever-reducing medication and their symptoms are improving or resolved.      If patient receives a negative COVID-19 test and if they had  a high risk exposure to someone who has tested positive for COVID-19 then they can return to work 14 days after their last contact with the positive individual    Note: If there is ongoing exposure to the covid positive person, this quarantine period may be longer than 14 days. (For example, if they are continually exposed to their child, who tested positive and cannot isolate from them, then the quarantine of 7-14 days can't start until their child is no longer contagious. This is typically 10 days from onset to the child's symptoms. So the total duration may be 17-24 days in this case.)     Sincerely,  Alexi Hays MD

## 2020-12-29 NOTE — TELEPHONE ENCOUNTER
Called regarding symptoms of possible COVID-19 infection following and exposure to a friend who tested positive for coronavirus.  See intial assessment below.  Taylor Saucedo RN  COVID 19 Nurse Triage Plan/Patient Instructions    Please be aware that novel coronavirus (COVID-19) may be circulating in the community. If you develop symptoms such as fever, cough, or SOB or if you have concerns about the presence of another infection including coronavirus (COVID-19), please contact your health care provider or visit [email or web address].     Disposition/Instructions    Virtual Visit with provider recommended. Reference Visit Selection Guide.    Thank you for taking steps to prevent the spread of this virus.  o Limit your contact with others.  o Wear a simple mask to cover your cough.  o Wash your hands well and often.    Resources    [facility]: About COVID-19: [email or web address]    CDC: What to Do If You're Sick: www.cdc.gov/coronavirus/2019-ncov/about/steps-when-sick.html    CDC: Ending Home Isolation: www.cdc.gov/coronavirus/2019-ncov/hcp/disposition-in-home-patients.html     CDC: Caring for Someone: www.cdc.gov/coronavirus/2019-ncov/if-you-are-sick/care-for-someone.html     [facility]: Interim Guidance for Hospital Discharge to Home: [email or web address]    [facility] clinical trials (COVID-19 research studies): clinicalaffairs.[facility]-clinical-trials     Below are the COVID-19 hotlines at the [facility] ([facility]). Interpreters are available.   o For health questions: Call [phone] or 1-934.342.4137 (7 a.m. to 7 p.m.)  o For questions about schools and childcare: Call [phone] or 1-325.461.9290 (7 a.m. to 7 p.m.)                   Reason for Disposition    [1] Symptoms of COVID-19 (e.g., cough, fever, SOB, or others) AND [2] within 14 days of EXPOSURE (close contact) with diagnosed or suspected COVID-19 patient    [1]  COVID-19 infection suspected by caller or triager AND [2] mild symptoms (cough, fever, or others) AND [3] no complications or SOB    Additional Information    Negative: COVID-19 lab test positive    Negative: [1] Lives with someone known to have influenza (flu test positive) AND [2] flu-like symptoms (e.g., cough, runny nose, sore throat, SOB; with or without fever)    Negative: [1] Symptoms of COVID-19 (e.g., cough, fever, SOB, or others) AND [2] HCP diagnosed COVID-19 based on symptoms    Negative: [1] Symptoms of COVID-19 (e.g., cough, fever, SOB, or others) AND [2] lives in an area with community spread    Negative: SEVERE difficulty breathing (e.g., struggling for each breath, speaks in single words)    Negative: Difficult to awaken or acting confused (e.g., disoriented, slurred speech)    Negative: Bluish (or gray) lips or face now    Negative: Shock suspected (e.g., cold/pale/clammy skin, too weak to stand, low BP, rapid pulse)    Negative: Sounds like a life-threatening emergency to the triager    Negative: [1] COVID-19 exposure AND [2] no symptoms    Negative: [1] Lives with someone known to have influenza (flu test positive) AND [2] flu-like symptoms (e.g., cough, runny nose, sore throat, SOB; with or without fever)    Negative: [1] Adult with possible COVID-19 symptoms AND [2] triager concerned about severity of symptoms or other causes    Negative: Immunization reaction suspected (e.g., fever, headache, muscle aches occurring during days 1-3 days after immunization)    Negative: COVID-19 and breastfeeding, questions about    Negative: SEVERE or constant chest pain or pressure (Exception: mild central chest pain, present only when coughing)    Negative: MODERATE difficulty breathing (e.g., speaks in phrases, SOB even at rest, pulse 100-120)    Negative: [1] Headache AND [2] stiff neck (can't touch chin to chest)    Negative: MILD difficulty breathing (e.g., minimal/no SOB at rest, SOB with walking, pulse  "<100)    Negative: Chest pain or pressure    Negative: Patient sounds very sick or weak to the triager    Negative: Fever > 103 F (39.4 C)    Negative: [1] Fever > 101 F (38.3 C) AND [2] age > 60    Negative: [1] Fever > 100.0 F (37.8 C) AND [2] bedridden (e.g., nursing home patient, CVA, chronic illness, recovering from surgery)    Negative: [1] HIGH RISK patient (e.g., age > 64 years, diabetes, heart or lung disease, weak immune system) AND [2] new or worsening symptoms    Negative: [1] HIGH RISK patient AND [2] influenza is widespread in the community AND [3] ONE OR MORE respiratory symptoms: cough, sore throat, runny or stuffy nose    Negative: [1] HIGH RISK patient AND [2] influenza exposure within the last 7 days AND [3] ONE OR MORE respiratory symptoms: cough, sore throat, runny or stuffy nose    Negative: Fever present > 3 days (72 hours)    Negative: [1] Fever returns after gone for over 24 hours AND [2] symptoms worse or not improved    Negative: [1] Continuous (nonstop) coughing interferes with work or school AND [2] no improvement using cough treatment per protocol    Answer Assessment - Initial Assessment Questions  1. COVID-19 CLOSE CONTACT: \"Who is the person with the confirmed or suspected COVID-19 infection that you were exposed to?\"      A friend  2. PLACE of CONTACT: \"Where were you when you were exposed to COVID-19?\" (e.g., home, school, medical waiting room; which city?)      Home  3. TYPE of CONTACT: \"How much contact was there?\" (e.g., sitting next to, live in same house, work in same office, same building)      Same house  4. DURATION of CONTACT: \"How long were you in contact with the COVID-19 patient?\" (e.g., a few seconds, passed by person, a few minutes, 15 minutes or longer, live with the patient)      7 hours  5. MASK: \"Were you wearing a mask?\" \"Was the other person wearing a mask?\" Note: wearing a mask reduces the risk of an   otherwise close contact.      No  6. DATE of CONTACT: \"When " "did you have contact with a COVID-19 patient?\" (e.g., how many days ago)      Last Wednesday (about 7 days ago)  7. COMMUNITY SPREAD: \"Are there lots of cases of COVID-19 (community spread) where you live?\" (See [facility] health department website, if unsure)        Yes  8. SYMPTOMS: \"Do you have any symptoms?\" (e.g., fever, cough, breathing difficulty, loss of taste or smell)      Yes-Headache, sore throat, mild cough, mild fatigue  9. PREGNANCY OR POSTPARTUM: \"Is there any chance you are pregnant?\" \"When was your last menstrual period?\" \"Did you deliver in the last 2 weeks?\"      No  10. HIGH RISK: \"Do you have any heart or lung problems? Do you have a weak immune system?\" (e.g., heart failure, COPD, asthma, HIV positive, chemotherapy, renal failure, diabetes mellitus, sickle cell anemia, obesity)        No  11.  TRAVEL: \"Have you traveled out of the country recently?\" If so, \"When and where?\"  Also ask about out-of-state travel, since the [facility] has identified some high-risk cities for community spread in the .  Note: Travel becomes less relevant if there is widespread community transmission where the patient lives.        No    Protocols used: CORONAVIRUS (COVID-19) EXPOSURE-[facility] 12.1, CORONAVIRUS (COVID-19) DIAGNOSED OR [id number]-HI 12.1      "

## 2020-12-30 DIAGNOSIS — Z20.822 SUSPECTED COVID-19 VIRUS INFECTION: ICD-10-CM

## 2020-12-30 PROCEDURE — U0003 INFECTIOUS AGENT DETECTION BY NUCLEIC ACID (DNA OR RNA); SEVERE ACUTE RESPIRATORY SYNDROME CORONAVIRUS 2 (SARS-COV-2) (CORONAVIRUS DISEASE [COVID-19]), AMPLIFIED PROBE TECHNIQUE, MAKING USE OF HIGH THROUGHPUT TECHNOLOGIES AS DESCRIBED BY CMS-2020-01-R: HCPCS | Performed by: EMERGENCY MEDICINE

## 2020-12-31 LAB
SARS-COV-2 RNA SPEC QL NAA+PROBE: NOT DETECTED
SPECIMEN SOURCE: NORMAL

## 2021-02-05 ENCOUNTER — OFFICE VISIT (OUTPATIENT)
Dept: FAMILY MEDICINE | Facility: CLINIC | Age: 21
End: 2021-02-05
Payer: COMMERCIAL

## 2021-02-05 VITALS
SYSTOLIC BLOOD PRESSURE: 128 MMHG | WEIGHT: 140.38 LBS | HEIGHT: 62 IN | DIASTOLIC BLOOD PRESSURE: 70 MMHG | RESPIRATION RATE: 16 BRPM | BODY MASS INDEX: 25.83 KG/M2 | HEART RATE: 76 BPM | TEMPERATURE: 98.9 F

## 2021-02-05 DIAGNOSIS — Z30.9 ENCOUNTER FOR CONTRACEPTIVE MANAGEMENT, UNSPECIFIED TYPE: Primary | ICD-10-CM

## 2021-02-05 DIAGNOSIS — F41.9 ANXIETY: ICD-10-CM

## 2021-02-05 PROCEDURE — 99214 OFFICE O/P EST MOD 30 MIN: CPT | Performed by: FAMILY MEDICINE

## 2021-02-05 RX ORDER — LEVONORGESTREL/ETHIN.ESTRADIOL 0.1-0.02MG
1 TABLET ORAL DAILY
Qty: 28 TABLET | Refills: 0 | Status: SHIPPED | OUTPATIENT
Start: 2021-02-05 | End: 2021-06-16

## 2021-02-05 RX ORDER — LORAZEPAM 0.5 MG/1
0.5 TABLET ORAL DAILY PRN
Qty: 5 TABLET | Refills: 0 | Status: SHIPPED | OUTPATIENT
Start: 2021-02-05 | End: 2021-05-25

## 2021-02-05 RX ORDER — ESCITALOPRAM OXALATE 10 MG/1
TABLET ORAL
Qty: 85 TABLET | Refills: 0 | Status: SHIPPED | OUTPATIENT
Start: 2021-02-05 | End: 2021-05-25

## 2021-02-05 ASSESSMENT — MIFFLIN-ST. JEOR: SCORE: 1359.99

## 2021-02-06 NOTE — PROGRESS NOTES
Assessment/Plan:    Girish Calderón is a 20 year old female presenting for:    Encounter for contraceptive management, unspecified type  Because she is having bleeding with her Nexplanon I will put her on a 1 month course of oral contraceptive pills.  She is not had a migraine with aura for several years and thus I think it is fairly low risk to do a 1 month prescription.  Discussed this with the patient but did discuss the increased risk of stroke.  Patient is willing to do a trial of 1 month of this medication.  - levonorgestrel-ethinyl estradiol (AVIANE) 0.1-20 MG-MCG tablet  Dispense: 28 tablet; Refill: 0    Anxiety  Discussed risks and benefits of several different medications and ultimately she has opted to trial Lexapro.  This was sent to the pharmacy.  I did send a very small supply of Ativan to the pharmacy as well for her.  Discussed that this is something that she can only use rarely due to the controlled substance and habit-forming nature.  She will try taking half a tablet if she feels as though she needs to and will let me know.  She has tried hydroxyzine in the past did not find it to be effective although we could trial this again as well.  - escitalopram (LEXAPRO) 10 MG tablet  Dispense: 85 tablet; Refill: 0  - LORazepam (ATIVAN) 0.5 MG tablet  Dispense: 5 tablet; Refill: 0      Medications Discontinued During This Encounter   Medication Reason     clotrimazole (LOTRIMIN) 1 % external cream      IBUPROFEN PO      fluticasone (FLONASE) 50 MCG/ACT spray      etonogestrel 68 MG SC IMPL      emollient (VANICREAM) external cream            Chief Complaint:  Establish Care, Anxiety, Genetic Counseling, and Contraception        Subjective:   Girish Calderón is a pleasant 20-year-old female presenting to the clinic today to discuss several concerns:    1.  Genetic counseling: Patient notes that her mother unfortunately passed away of colon cancer when she was 46.  This was noted when she was 45.   Patient has been having some GI issues including some bloating and diarrhea.  She is actually seeing the gastroenterologist next week.  She is planning on discussing this with them and may be getting some blood work however wonders if she should be having some genetic testing.  Her father was recently diagnosed with prostate cancer.  Apparently this was caught early and he is overall doing well.  She notes that there is a lot of colon cancer on her mother side and a lot of prostate cancer in her father side.  Breast cancer in her grandmother.      2.  Contraception: Patient has a Nexplanon arm implant.  This is her second Nexplanon.  With her first Nexplanon she had fairly normal cycles.  With her second 1 she has had for about a year.  She has some normal cycles however the last few months she is been having longer menstrual cycles and continued spotting.  She states that she is not bleeding heavily but spotting throughout the entire month.  She does not state that she is under increased stress recently particularly with her father's aforementioned diagnosis.  She is on the Nexplanon given that she was told she cannot take estrogen given that she has had migraines with aura in the past.  Has not had 1 of these migraines for at least a year and a half.      3.  Anxiety: Patient has been feeling very anxious recently.  When she was younger she was on medication for anxiety however has not been on any medication for quite some time.  She notes that she is feeling more more anxious recently and is wondering about being back on medication.  She believes that she tried Celexa and Zoloft in the past.  She was being treated for depression at that time.  She states the medications did not help.  She also saw a therapist which she states was not very beneficial for her either.  She has been having intermittent panic attacks.  She states that generally she is able to do some deep breathing and divert the panic attack however  "sometimes this is not feasible.  She states when that happens she will need to just try to go to sleep.  She states that there have been a few times where she almost went to emergency department due to her anxiety.  When she is anxious she has a difficult time concentrating as well.    No suicidal or homicidal ideations.  She is not interested in seeing a therapist at this time.  She thinks that she does have a fairly good support system.    12 point review of systems completed and negative except for what has been described above    History   Smoking Status     Passive Smoke Exposure - Never Smoker   Smokeless Tobacco     Never Used     Comment: father smokes outside         Current Outpatient Medications:      escitalopram (LEXAPRO) 10 MG tablet, Take 0.5 tablets (5 mg) by mouth daily for 10 days, THEN 1 tablet (10 mg) daily., Disp: 85 tablet, Rfl: 0     etonogestrel (IMPLANON/NEXPLANON) 68 MG IMPL, 1 each (68 mg) by Subdermal route continuous, Disp: , Rfl: 0     fluocinonide (LIDEX) 0.05 % external solution, Apply to scalp BID x 1-2 weeks PRN, Disp: 50 mL, Rfl: 3     hydrocortisone 2.5 % cream, Apply to face BID x 5-7 days PRN, Disp: 30 g, Rfl: 5     ketoconazole (NIZORAL) 2 % external shampoo, Apply topically daily as needed for itching or irritation, Disp: 120 mL, Rfl: 1     levonorgestrel-ethinyl estradiol (AVIANE) 0.1-20 MG-MCG tablet, Take 1 tablet by mouth daily, Disp: 28 tablet, Rfl: 0     LORazepam (ATIVAN) 0.5 MG tablet, Take 1 tablet (0.5 mg) by mouth daily as needed for anxiety, Disp: 5 tablet, Rfl: 0     triamcinolone (KENALOG) 0.1 % external cream, Apply topically 2 times daily, Disp: 80 g, Rfl: 0      Objective:  Vitals:    02/05/21 1451   BP: 128/70   Pulse: 76   Resp: 16   Temp: 98.9  F (37.2  C)   TempSrc: Tympanic   Weight: 63.7 kg (140 lb 6 oz)   Height: 1.575 m (5' 2\")       Body mass index is 25.67 kg/m .    Vital signs reviewed and stable  General: No acute distress  Psych: Appropriate " affect  HEENT: moist mucous membranes, pupils equal, round, reactive to light and accomodation, tympanic membranes are pearly grey bilaterally  Lymph: no cervical or supraclavicular lymphadenopathy  Cardiovascular: regular rate and rhythm with no murmur  Pulmonary: clear to auscultation bilaterally with no wheeze  Abdomen: soft, non tender, non distended with normo-active bowel sounds  Extremities: warm and well perfused with no edema  Skin: warm and dry with no rash         This note has been dictated and transcribed using voice recognition software.   Any errors in transcription are unintentional and inherent to the software.   none

## 2021-02-09 ASSESSMENT — ANXIETY QUESTIONNAIRES
6. BECOMING EASILY ANNOYED OR IRRITABLE: NEARLY EVERY DAY
5. BEING SO RESTLESS THAT IT IS HARD TO SIT STILL: NEARLY EVERY DAY
1. FEELING NERVOUS, ANXIOUS, OR ON EDGE: NEARLY EVERY DAY
2. NOT BEING ABLE TO STOP OR CONTROL WORRYING: NEARLY EVERY DAY
IF YOU CHECKED OFF ANY PROBLEMS ON THIS QUESTIONNAIRE, HOW DIFFICULT HAVE THESE PROBLEMS MADE IT FOR YOU TO DO YOUR WORK, TAKE CARE OF THINGS AT HOME, OR GET ALONG WITH OTHER PEOPLE: EXTREMELY DIFFICULT
GAD7 TOTAL SCORE: 21
7. FEELING AFRAID AS IF SOMETHING AWFUL MIGHT HAPPEN: NEARLY EVERY DAY
3. WORRYING TOO MUCH ABOUT DIFFERENT THINGS: NEARLY EVERY DAY

## 2021-02-09 ASSESSMENT — PATIENT HEALTH QUESTIONNAIRE - PHQ9
SUM OF ALL RESPONSES TO PHQ QUESTIONS 1-9: 12
5. POOR APPETITE OR OVEREATING: NEARLY EVERY DAY

## 2021-02-10 ENCOUNTER — VIRTUAL VISIT (OUTPATIENT)
Dept: GASTROENTEROLOGY | Facility: CLINIC | Age: 21
End: 2021-02-10
Payer: COMMERCIAL

## 2021-02-10 DIAGNOSIS — R19.7 DIARRHEA, UNSPECIFIED TYPE: ICD-10-CM

## 2021-02-10 DIAGNOSIS — Z80.0 FAMILY HISTORY OF COLON CANCER: ICD-10-CM

## 2021-02-10 DIAGNOSIS — R68.81 EARLY SATIETY: ICD-10-CM

## 2021-02-10 DIAGNOSIS — R14.0 BLOATING: Primary | ICD-10-CM

## 2021-02-10 DIAGNOSIS — R10.84 GENERALIZED ABDOMINAL PAIN: ICD-10-CM

## 2021-02-10 PROCEDURE — 99204 OFFICE O/P NEW MOD 45 MIN: CPT | Mod: TEL | Performed by: INTERNAL MEDICINE

## 2021-02-10 ASSESSMENT — ANXIETY QUESTIONNAIRES: GAD7 TOTAL SCORE: 21

## 2021-02-10 NOTE — PROGRESS NOTES
Girish is a 20 year old who is being evaluated via a billable telephone visit.      What phone number would you like to be contacted at? [phone]   How would you like to obtain your AVS? MyChart  Phone call duration:  Minutes\  Mir Marie CMA

## 2021-02-10 NOTE — PROGRESS NOTES
HPI:    Girish presents today for a telephone visit to discuss longstanding GI symptoms including cramping abdominal pain, heartburn, pain after eating, early satiety and bloating.  Also has nausea which occurs almost every night.  Has bloating and feels like food doesn't digest well.  Also has frequent diarrhea - tends to happen more on weekends when she might eat more junk food but unable to pinpoint.    Seems worse with certain foods like fatty foods, red sauces, dairy, etc.  Tries to avoid dairy    Mother with colon cancer diagnosed in her early 40s. Maternal grandmother also with colon cancer diagnosed at a young age.  Patient doesn't think anyone has ever seen genetics regarding this - she has been told to see them but never went.      Past Surgical History:   Procedure Laterality Date     MYRINGOTOMY, INSERT TUBE, COMBINED  5/16/2011    Procedure:COMBINED MYRINGOTOMY, INSERT TUBE; Surgeon:GERARDO MATUTE; Location:[facility]     MYRINGOTOMY, INSERT TUBE, COMBINED Left 9/10/2014    Procedure: COMBINED MYRINGOTOMY, INSERT TUBE;  Surgeon: Gerardo Matute MD;  Location: [facility] OR       Family History   Problem Relation Age of Onset     Hypertension Mother      Lung Cancer Mother         approx age 45     Colon Cancer Mother      Other Cancer Mother         liver     Cancer - colorectal Maternal Grandmother      Breast Cancer Paternal Grandmother      Depression Paternal Grandmother      Prostate Cancer Paternal Grandfather      Heart Disease Paternal Grandfather      Depression Paternal Grandfather      Heart Disease Paternal Uncle 52        heart attack      Heart Disease Maternal Grandfather      Depression Father      Anxiety Disorder Sister      C.A.D. No family hx of      Diabetes No family hx of      Cerebrovascular Disease No family hx of        Social History     Tobacco Use     Smoking status: Passive Smoke Exposure - Never Smoker     Smokeless tobacco: Never Used     Tobacco comment: father  smokes outside   Substance Use Topics     Alcohol use: No        Assessment and Plan:    # diarrhea - will start fiber supplements, will also check celiac serologies, CBC and CRP    # bloating, epigastric abdominal pain - ddx includes biliary etiology, PUD, gastritis, etc.  Will check stool H. Pylori and RUQ US and GES.  Pending results, may consider EGD.    # family history of early onset colon cancer - will refer to genetics - pending that work-up and above studies, may consider colonoscopy for further evaluation.     RTC 3 months    Guera Willett DO     Phone call duration: 19 minutes

## 2021-02-10 NOTE — PATIENT INSTRUCTIONS
Please start a fiber supplement like metamucil or citrucel - start with once a day and gradually increase by a dose until you are taking it 3 times a day.    You can try pepcid (famotidine) as needed for heartburn/nausea.    Please have blood work and stool studies done.  Please also have a gastric emptying scan and an ultrasound.    I have put in a referral for you to see genetics.

## 2021-02-11 ENCOUNTER — TELEPHONE (OUTPATIENT)
Dept: ONCOLOGY | Facility: CLINIC | Age: 21
End: 2021-02-11

## 2021-02-11 ENCOUNTER — TELEPHONE (OUTPATIENT)
Dept: GASTROENTEROLOGY | Facility: CLINIC | Age: 21
End: 2021-02-11

## 2021-02-11 NOTE — TELEPHONE ENCOUNTER
Called patient and spoke to.  Per last visit with Dr Willett patient needs:    NM scan  Labs  May 2021 follow up with Dr Willett.    Patient will call back to schedule these appointments.      Sandy Dang  Surgical Specialties Procedure   [facility] [place]  2/11/2021 2:53 PM

## 2021-02-12 ENCOUNTER — TELEPHONE (OUTPATIENT)
Dept: ONCOLOGY | Facility: CLINIC | Age: 21
End: 2021-02-12

## 2021-02-12 DIAGNOSIS — R19.7 DIARRHEA, UNSPECIFIED TYPE: ICD-10-CM

## 2021-02-12 DIAGNOSIS — R10.84 GENERALIZED ABDOMINAL PAIN: ICD-10-CM

## 2021-02-12 DIAGNOSIS — R68.81 EARLY SATIETY: ICD-10-CM

## 2021-02-12 DIAGNOSIS — R14.0 BLOATING: ICD-10-CM

## 2021-02-12 DIAGNOSIS — Z80.0 FAMILY HISTORY OF COLON CANCER: ICD-10-CM

## 2021-02-12 LAB
ALBUMIN SERPL-MCNC: 3.8 G/DL (ref 3.4–5)
ALP SERPL-CCNC: 70 U/L (ref 40–150)
ALT SERPL W P-5'-P-CCNC: 18 U/L (ref 0–50)
ANION GAP SERPL CALCULATED.3IONS-SCNC: 6 MMOL/L (ref 3–14)
AST SERPL W P-5'-P-CCNC: 14 U/L (ref 0–45)
BILIRUB SERPL-MCNC: 0.3 MG/DL (ref 0.2–1.3)
BUN SERPL-MCNC: 6 MG/DL (ref 7–30)
CALCIUM SERPL-MCNC: 9 MG/DL (ref 8.5–10.1)
CHLORIDE SERPL-SCNC: 108 MMOL/L (ref 94–109)
CO2 SERPL-SCNC: 27 MMOL/L (ref 20–32)
CREAT SERPL-MCNC: 0.76 MG/DL (ref 0.52–1.04)
CRP SERPL-MCNC: <2.9 MG/L (ref 0–8)
ERYTHROCYTE [DISTWIDTH] IN BLOOD BY AUTOMATED COUNT: 11.9 % (ref 10–15)
GFR SERPL CREATININE-BSD FRML MDRD: >90 ML/MIN/{1.73_M2}
GLUCOSE SERPL-MCNC: 81 MG/DL (ref 70–99)
HCT VFR BLD AUTO: 41.1 % (ref 35–47)
HGB BLD-MCNC: 13.8 G/DL (ref 11.7–15.7)
MCH RBC QN AUTO: 30.8 PG (ref 26.5–33)
MCHC RBC AUTO-ENTMCNC: 33.6 G/DL (ref 31.5–36.5)
MCV RBC AUTO: 92 FL (ref 78–100)
PLATELET # BLD AUTO: 286 10E9/L (ref 150–450)
POTASSIUM SERPL-SCNC: 3.8 MMOL/L (ref 3.4–5.3)
PROT SERPL-MCNC: 7.3 G/DL (ref 6.8–8.8)
RBC # BLD AUTO: 4.48 10E12/L (ref 3.8–5.2)
SODIUM SERPL-SCNC: 141 MMOL/L (ref 133–144)
WBC # BLD AUTO: 7.2 10E9/L (ref 4–11)

## 2021-02-12 PROCEDURE — 80053 COMPREHEN METABOLIC PANEL: CPT | Performed by: INTERNAL MEDICINE

## 2021-02-12 PROCEDURE — 86140 C-REACTIVE PROTEIN: CPT | Performed by: INTERNAL MEDICINE

## 2021-02-12 PROCEDURE — 87338 HPYLORI STOOL AG IA: CPT | Performed by: INTERNAL MEDICINE

## 2021-02-12 PROCEDURE — 36415 COLL VENOUS BLD VENIPUNCTURE: CPT | Performed by: INTERNAL MEDICINE

## 2021-02-12 PROCEDURE — 82784 ASSAY IGA/IGD/IGG/IGM EACH: CPT | Performed by: INTERNAL MEDICINE

## 2021-02-12 PROCEDURE — 85027 COMPLETE CBC AUTOMATED: CPT | Performed by: INTERNAL MEDICINE

## 2021-02-12 PROCEDURE — 83516 IMMUNOASSAY NONANTIBODY: CPT | Performed by: INTERNAL MEDICINE

## 2021-02-13 LAB
TTG IGA SER-ACNC: 1 U/ML
TTG IGG SER-ACNC: <1 U/ML

## 2021-02-14 LAB — IGA SERPL-MCNC: 152 MG/DL (ref 84–499)

## 2021-02-15 LAB — H PYLORI AG STL QL IA: NEGATIVE

## 2021-03-01 ENCOUNTER — APPOINTMENT (OUTPATIENT)
Dept: CT IMAGING | Facility: CLINIC | Age: 21
End: 2021-03-01
Attending: NURSE PRACTITIONER
Payer: OTHER MISCELLANEOUS

## 2021-03-01 ENCOUNTER — HOSPITAL ENCOUNTER (EMERGENCY)
Facility: CLINIC | Age: 21
Discharge: HOME | End: 2021-03-01
Attending: NURSE PRACTITIONER | Admitting: NURSE PRACTITIONER
Payer: OTHER MISCELLANEOUS

## 2021-03-01 VITALS
OXYGEN SATURATION: 99 % | DIASTOLIC BLOOD PRESSURE: 72 MMHG | BODY MASS INDEX: 26.37 KG/M2 | SYSTOLIC BLOOD PRESSURE: 117 MMHG | HEIGHT: 62 IN | HEART RATE: 73 BPM | TEMPERATURE: 99.1 F | WEIGHT: 143.3 LBS | RESPIRATION RATE: 16 BRPM

## 2021-03-01 DIAGNOSIS — R51.9 HEADACHE: ICD-10-CM

## 2021-03-01 DIAGNOSIS — R58 MULTIPLE ECCHYMOSES OF BOTH UPPER ARMS: ICD-10-CM

## 2021-03-01 DIAGNOSIS — T14.90XA TRAUMA: ICD-10-CM

## 2021-03-01 DIAGNOSIS — T14.8XXA CONTUSION OF SOFT TISSUE: ICD-10-CM

## 2021-03-01 DIAGNOSIS — S06.0X0A CONCUSSION WITHOUT LOSS OF CONSCIOUSNESS: ICD-10-CM

## 2021-03-01 DIAGNOSIS — S09.90XA HEAD INJURY, INITIAL ENCOUNTER: ICD-10-CM

## 2021-03-01 PROCEDURE — 250N000011 HC RX IP 250 OP 636: Performed by: NURSE PRACTITIONER

## 2021-03-01 PROCEDURE — 250N000013 HC RX MED GY IP 250 OP 250 PS 637: Performed by: NURSE PRACTITIONER

## 2021-03-01 PROCEDURE — 99285 EMERGENCY DEPT VISIT HI MDM: CPT | Mod: 25 | Performed by: NURSE PRACTITIONER

## 2021-03-01 PROCEDURE — 70450 CT HEAD/BRAIN W/O DYE: CPT

## 2021-03-01 PROCEDURE — 99284 EMERGENCY DEPT VISIT MOD MDM: CPT | Performed by: NURSE PRACTITIONER

## 2021-03-01 PROCEDURE — 96375 TX/PRO/DX INJ NEW DRUG ADDON: CPT | Performed by: NURSE PRACTITIONER

## 2021-03-01 PROCEDURE — 96374 THER/PROPH/DIAG INJ IV PUSH: CPT | Performed by: NURSE PRACTITIONER

## 2021-03-01 RX ORDER — DIPHENHYDRAMINE HYDROCHLORIDE 50 MG/ML
25 INJECTION INTRAMUSCULAR; INTRAVENOUS ONCE
Status: COMPLETED | OUTPATIENT
Start: 2021-03-01 | End: 2021-03-01

## 2021-03-01 RX ORDER — KETOROLAC TROMETHAMINE 15 MG/ML
15 INJECTION, SOLUTION INTRAMUSCULAR; INTRAVENOUS ONCE
Status: COMPLETED | OUTPATIENT
Start: 2021-03-01 | End: 2021-03-01

## 2021-03-01 RX ORDER — ACETAMINOPHEN 500 MG
1000 TABLET ORAL ONCE
Status: COMPLETED | OUTPATIENT
Start: 2021-03-01 | End: 2021-03-01

## 2021-03-01 RX ADMIN — ACETAMINOPHEN 1000 MG: 500 TABLET, FILM COATED ORAL at 21:29

## 2021-03-01 RX ADMIN — KETOROLAC TROMETHAMINE 15 MG: 15 INJECTION, SOLUTION INTRAMUSCULAR; INTRAVENOUS at 22:10

## 2021-03-01 RX ADMIN — PROCHLORPERAZINE EDISYLATE 5 MG: 5 INJECTION INTRAMUSCULAR; INTRAVENOUS at 21:22

## 2021-03-01 RX ADMIN — DIPHENHYDRAMINE HYDROCHLORIDE 25 MG: 50 INJECTION, SOLUTION INTRAMUSCULAR; INTRAVENOUS at 21:19

## 2021-03-01 ASSESSMENT — ENCOUNTER SYMPTOMS
WHEEZING: 0
CONFUSION: 0
CONSTIPATION: 0
COLOR CHANGE: 1
NAUSEA: 0
CHILLS: 0
COUGH: 0
MYALGIAS: 0
DIARRHEA: 0
FEVER: 0
VOMITING: 0
HEADACHES: 1
DIAPHORESIS: 0
LIGHT-HEADEDNESS: 1
DYSURIA: 0
SPEECH DIFFICULTY: 0
ABDOMINAL PAIN: 0
DIFFICULTY URINATING: 0
SORE THROAT: 0
EYE PAIN: 0
SHORTNESS OF BREATH: 0
FACIAL ASYMMETRY: 0

## 2021-03-01 ASSESSMENT — MIFFLIN-ST. JEOR: SCORE: 1373.25

## 2021-03-02 NOTE — DISCHARGE INSTRUCTIONS
Off work, no TV, no computer time and no screen time for minimum of 48 hours.  Then gradually reintroduce.  Follow-up with primary care provider on Thursday for reevaluation prior to return to work.  For the bruises and generalized aches and pains you may take Tylenol 1000 mg up to 4 times daily or ibuprofen 600 mg up to 4 times daily.  Return if there is severe worsening headache, mental confusion, nausea, vomiting, gait changes.
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
## 2021-03-05 ENCOUNTER — OFFICE VISIT (OUTPATIENT)
Dept: FAMILY MEDICINE | Facility: CLINIC | Age: 21
End: 2021-03-05
Payer: OTHER MISCELLANEOUS

## 2021-03-05 VITALS
TEMPERATURE: 98.6 F | HEIGHT: 62 IN | HEART RATE: 68 BPM | DIASTOLIC BLOOD PRESSURE: 80 MMHG | BODY MASS INDEX: 24.89 KG/M2 | SYSTOLIC BLOOD PRESSURE: 134 MMHG | RESPIRATION RATE: 12 BRPM | WEIGHT: 135.25 LBS

## 2021-03-05 DIAGNOSIS — S06.0X0D CONCUSSION WITHOUT LOSS OF CONSCIOUSNESS, SUBSEQUENT ENCOUNTER: Primary | ICD-10-CM

## 2021-03-05 PROCEDURE — 99214 OFFICE O/P EST MOD 30 MIN: CPT | Performed by: FAMILY MEDICINE

## 2021-03-05 RX ORDER — CYCLOBENZAPRINE HCL 5 MG
5 TABLET ORAL 3 TIMES DAILY PRN
Qty: 20 TABLET | Refills: 1 | Status: SHIPPED | OUTPATIENT
Start: 2021-03-05 | End: 2021-06-16

## 2021-03-05 RX ORDER — ONDANSETRON 4 MG/1
4 TABLET, ORALLY DISINTEGRATING ORAL EVERY 8 HOURS PRN
Qty: 20 TABLET | Refills: 1 | Status: SHIPPED | OUTPATIENT
Start: 2021-03-05 | End: 2021-06-16

## 2021-03-05 ASSESSMENT — MIFFLIN-ST. JEOR: SCORE: 1336.74
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
## 2021-05-25 DIAGNOSIS — F41.9 ANXIETY: ICD-10-CM

## 2021-05-25 RX ORDER — ESCITALOPRAM OXALATE 10 MG/1
10 TABLET ORAL DAILY
Qty: 90 TABLET | Refills: 1 | Status: SHIPPED | OUTPATIENT
Start: 2021-05-25 | End: 2021-12-13

## 2021-05-25 RX ORDER — ESCITALOPRAM OXALATE 10 MG/1
10 TABLET ORAL DAILY
Qty: 10 TABLET | Refills: 0 | Status: SHIPPED | OUTPATIENT
Start: 2021-05-25 | End: 2021-05-25

## 2021-05-25 RX ORDER — LORAZEPAM 0.5 MG/1
0.5 TABLET ORAL DAILY PRN
Qty: 5 TABLET | Refills: 0 | Status: SHIPPED | OUTPATIENT
Start: 2021-05-25 | End: 2021-07-16

## 2021-06-16 ENCOUNTER — OFFICE VISIT (OUTPATIENT)
Dept: FAMILY MEDICINE | Facility: CLINIC | Age: 21
End: 2021-06-16
Payer: COMMERCIAL

## 2021-06-16 VITALS
DIASTOLIC BLOOD PRESSURE: 78 MMHG | WEIGHT: 131.4 LBS | BODY MASS INDEX: 24.18 KG/M2 | SYSTOLIC BLOOD PRESSURE: 118 MMHG | OXYGEN SATURATION: 98 % | TEMPERATURE: 99 F | RESPIRATION RATE: 16 BRPM | HEIGHT: 62 IN | HEART RATE: 101 BPM

## 2021-06-16 DIAGNOSIS — Z30.9 ENCOUNTER FOR CONTRACEPTIVE MANAGEMENT, UNSPECIFIED TYPE: ICD-10-CM

## 2021-06-16 DIAGNOSIS — F33.41 RECURRENT MAJOR DEPRESSIVE DISORDER, IN PARTIAL REMISSION (H): Primary | ICD-10-CM

## 2021-06-16 DIAGNOSIS — Z30.46 ENCOUNTER FOR NEXPLANON REMOVAL: ICD-10-CM

## 2021-06-16 PROBLEM — Z30.017 NEXPLANON INSERTION: Status: RESOLVED | Noted: 2017-03-09 | Resolved: 2021-06-16

## 2021-06-16 PROCEDURE — 99213 OFFICE O/P EST LOW 20 MIN: CPT | Mod: 25 | Performed by: FAMILY MEDICINE

## 2021-06-16 PROCEDURE — 11982 REMOVE DRUG IMPLANT DEVICE: CPT | Performed by: FAMILY MEDICINE

## 2021-06-16 RX ORDER — LEVONORGESTREL/ETHIN.ESTRADIOL 0.1-0.02MG
1 TABLET ORAL DAILY
Qty: 84 TABLET | Refills: 4 | Status: SHIPPED | OUTPATIENT
Start: 2021-06-16 | End: 2022-07-19

## 2021-06-16 ASSESSMENT — MIFFLIN-ST. JEOR: SCORE: 1314.28

## 2021-06-16 NOTE — PATIENT INSTRUCTIONS
ORAL CONTRACEPTIVE START INSTRUCTION  Start the pack today or tomorrow   Take pill by mouth daily at same time every day  Expect the period during blank Pill section  Start next packet on time, if late start use condom in addition that month  - should use  back up the pill with a condom during the first cycle.  - You  need  additional protection, such as a condom, to prevent exposure to sexually transmitted diseases  and HIV     MISSED PILL INSTRUCTION ( expect breakthrough bleeding)   - If miss one pill take it when you remember, if its the next day take 2 at once.   - If you miss 2 pills in a row - take 2 pills for the next 2 days and use a back up condom until the next  cycle    SIDE EFFECTS  - bleeding in between periods is normal for first 2 months - should decrease by the third month  - blood clots in the leg that can travel to heart, lungs or brain are rare.    smoking increases that risk.   - redness, heat, swelling in the legs may represent a blood clot so call immediately  - Hormones can cause mood changes, headaches, and stomach upset.  These usually go down after a few months but if they don't we can change the pill to a different hormone pill.    MEDICATION INTERACTION  - some medication can interfere with oral contraceptive pills such as antibiotics. Use condoms during the month on antibiotics or other prescription medication that may decrease oral contraceptive pill  effectiveness      NEXPLANON Removal AFTERCARE INSTRUCTIONS     Keep dressing on and dry for 24 hours, then remove wrap.  Replace bandaid daily for 5 days.      You may have some pain at the site of the Nexplanon removal. You can help relieve the discomfort with Tylenol (acetaminophen), Aspirin or Advil (ibuprofen). If your discomfort worsens or you notice redness spreading on the skin around the  site, please call the clinic.     Warning Signs   Call the clinic if any of the following occurs:     You have bleeding, pus, or increasing  redness, or pain at site.     You have fever or chills

## 2021-06-16 NOTE — PROGRESS NOTES
"    Assessment & Plan     Recurrent major depression in partial remission on lexapro.    Encounter for contraceptive management, unspecified type  Switch from nexplanon to pill  - levonorgestrel-ethinyl estradiol (AVIANE) 0.1-20 MG-MCG tablet; Take 1 tablet by mouth daily    Encounter for Nexplanon removal  - REMOVAL NEXPLANON                 Return for Routine preventive. planning to do this soon will do the STD screening then.    Jose Abdalla MD  [facility]    Giana Stout is a 21 year old who presents for the following health issues     HPI     Chief Complaint   Patient presents with     Contraception     Nexplanon removal and switch to pill form.   Nexplanon placed 2/21/2020  Had heavy bleeding initially and tried birth control pill in addition for a month with ongoing very irregular bleeding.    No migraine in the last two years so ok'd by neurology that likely out grew migraines.        Review of Systems       Objective    /78   Pulse 101   Temp 99  F (37.2  C) (Tympanic)   Resp 16   Ht 1.575 m (5' 2\")   Wt 59.6 kg (131 lb 6.4 oz)   LMP 05/26/2021 (Approximate)   SpO2 98%   BMI 24.03 kg/m    Body mass index is 24.03 kg/m .  Physical Exam             Nexplanon Removal:     Is a pregnancy test required: No.  Was a consent obtained?  Yes    Girish Calderón is here for removal of etonogestrel implant Nexplanon/Implanon    Indication: irregular periods      Preoperative Diagnosis: etonogestrel implant  Postoperative Diagnosis: etonogestrel implant removed    Technique: On the left arm  Skin prep Betadine  Anesthesia 1% lidocaine, with epi  Procedure: Small incision (<5mm) was made at distal end of palpable implant, curved hemostat or mosquito forceps was used to isolate the implant and bring it to the incision, the fibrous capsule containing the implant  was incised and the Implant was removed intact.      EBL: minimal  Complications:  No  Tolerance:  Pt " tolerated procedure well and was in stable condition.   Dressing:    A pressure bandage was placed for the next 12-24 hours.    Contraception was discussed and patient chose the following method oral contraceptives      Follow up: Pt was instructed to call if bleeding, severe pain or foul smell.     Jose Abdalla MD

## 2021-07-16 ENCOUNTER — OFFICE VISIT (OUTPATIENT)
Dept: URGENT CARE | Facility: URGENT CARE | Age: 21
End: 2021-07-16
Payer: COMMERCIAL

## 2021-07-16 VITALS
OXYGEN SATURATION: 94 % | TEMPERATURE: 98.4 F | SYSTOLIC BLOOD PRESSURE: 134 MMHG | DIASTOLIC BLOOD PRESSURE: 94 MMHG | HEART RATE: 94 BPM

## 2021-07-16 DIAGNOSIS — R30.0 DYSURIA: ICD-10-CM

## 2021-07-16 DIAGNOSIS — L29.9 ITCHING: ICD-10-CM

## 2021-07-16 DIAGNOSIS — R21 RASH: Primary | ICD-10-CM

## 2021-07-16 LAB
ALBUMIN SERPL-MCNC: 4 G/DL (ref 3.4–5)
ALBUMIN UR-MCNC: NEGATIVE MG/DL
ALP SERPL-CCNC: 55 U/L (ref 40–150)
ALT SERPL W P-5'-P-CCNC: 18 U/L (ref 0–50)
ANION GAP SERPL CALCULATED.3IONS-SCNC: 3 MMOL/L (ref 3–14)
APPEARANCE UR: CLEAR
AST SERPL W P-5'-P-CCNC: 24 U/L (ref 0–45)
BASOPHILS # BLD AUTO: 0 10E3/UL (ref 0–0.2)
BASOPHILS NFR BLD AUTO: 1 %
BILIRUB SERPL-MCNC: 0.7 MG/DL (ref 0.2–1.3)
BILIRUB UR QL STRIP: NEGATIVE
BUN SERPL-MCNC: 9 MG/DL (ref 7–30)
CALCIUM SERPL-MCNC: 9.2 MG/DL (ref 8.5–10.1)
CHLORIDE BLD-SCNC: 104 MMOL/L (ref 94–109)
CLUE CELLS: ABNORMAL
CO2 SERPL-SCNC: 28 MMOL/L (ref 20–32)
COLOR UR AUTO: YELLOW
CREAT SERPL-MCNC: 0.7 MG/DL (ref 0.52–1.04)
EOSINOPHIL # BLD AUTO: 0.1 10E3/UL (ref 0–0.7)
EOSINOPHIL NFR BLD AUTO: 2 %
ERYTHROCYTE [DISTWIDTH] IN BLOOD BY AUTOMATED COUNT: 11.7 % (ref 10–15)
GFR SERPL CREATININE-BSD FRML MDRD: >90 ML/MIN/1.73M2
GLUCOSE BLD-MCNC: 83 MG/DL (ref 70–99)
GLUCOSE UR STRIP-MCNC: NEGATIVE MG/DL
HCT VFR BLD AUTO: 42.8 % (ref 35–47)
HGB BLD-MCNC: 14.4 G/DL (ref 11.7–15.7)
HGB UR QL STRIP: NEGATIVE
KETONES UR STRIP-MCNC: NEGATIVE MG/DL
LEUKOCYTE ESTERASE UR QL STRIP: NEGATIVE
LYMPHOCYTES # BLD AUTO: 1.8 10E3/UL (ref 0.8–5.3)
LYMPHOCYTES NFR BLD AUTO: 29 %
MCH RBC QN AUTO: 31.3 PG (ref 26.5–33)
MCHC RBC AUTO-ENTMCNC: 33.6 G/DL (ref 31.5–36.5)
MCV RBC AUTO: 93 FL (ref 78–100)
MONOCYTES # BLD AUTO: 0.6 10E3/UL (ref 0–1.3)
MONOCYTES NFR BLD AUTO: 9 %
NEUTROPHILS # BLD AUTO: 3.9 10E3/UL (ref 1.6–8.3)
NEUTROPHILS NFR BLD AUTO: 61 %
NITRATE UR QL: NEGATIVE
PH UR STRIP: 8 [PH] (ref 5–7)
PLATELET # BLD AUTO: 278 10E3/UL (ref 150–450)
POTASSIUM BLD-SCNC: 4.1 MMOL/L (ref 3.4–5.3)
PROT SERPL-MCNC: 7.3 G/DL (ref 6.8–8.8)
RBC # BLD AUTO: 4.6 10E6/UL (ref 3.8–5.2)
SODIUM SERPL-SCNC: 135 MMOL/L (ref 133–144)
SP GR UR STRIP: 1.01 (ref 1–1.03)
TRICHOMONAS, WET PREP: ABNORMAL
UROBILINOGEN UR STRIP-ACNC: 0.2 E.U./DL
WBC # BLD AUTO: 6.4 10E3/UL (ref 4–11)
WBC'S/HIGH POWER FIELD, WET PREP: ABNORMAL
YEAST, WET PREP: ABNORMAL

## 2021-07-16 PROCEDURE — 99214 OFFICE O/P EST MOD 30 MIN: CPT | Performed by: PHYSICIAN ASSISTANT

## 2021-07-16 PROCEDURE — 85025 COMPLETE CBC W/AUTO DIFF WBC: CPT | Performed by: PHYSICIAN ASSISTANT

## 2021-07-16 PROCEDURE — 87210 SMEAR WET MOUNT SALINE/INK: CPT | Performed by: PHYSICIAN ASSISTANT

## 2021-07-16 PROCEDURE — 36415 COLL VENOUS BLD VENIPUNCTURE: CPT | Performed by: PHYSICIAN ASSISTANT

## 2021-07-16 PROCEDURE — 80053 COMPREHEN METABOLIC PANEL: CPT | Performed by: PHYSICIAN ASSISTANT

## 2021-07-16 PROCEDURE — 81003 URINALYSIS AUTO W/O SCOPE: CPT

## 2021-07-16 RX ORDER — CLOTRIMAZOLE AND BETAMETHASONE DIPROPIONATE 10; .64 MG/G; MG/G
CREAM TOPICAL 2 TIMES DAILY
Qty: 45 G | Refills: 0 | Status: SHIPPED | OUTPATIENT
Start: 2021-07-16 | End: 2022-01-11

## 2021-07-16 NOTE — PROGRESS NOTES
Assessment & Plan     1. Rash  - clotrimazole-betamethasone (LOTRISONE) 1-0.05 % external cream; Apply topically 2 times daily Do not use for more than 10 days.  Dispense: 45 g; Refill: 0    2. Dysuria  NO evidence of infection. Encouraged using lotrisone on outer vaginal area where itching is (for no longer than one week - 10 d) Avoid intercourse until symptoms have improved.  If symptoms do not improve in 5-7 days follow-up with GP.   - UA macro with reflex to Microscopic and Culture - Clinc Collect  - Wet prep - Clinic Collect  - Comprehensive metabolic panel; Future  - CBC with platelets and differential; Future  - Comprehensive metabolic panel  - CBC with platelets and differential    3. Itching  Likely related to eczema. Moisturize.  LFTs normal.         JACKIE Ibarra [facility]    CHIEF COMPLAINT:   Chief Complaint   Patient presents with     Urgent Care     possible yeast infection p0lpdlm-1 month, whole body and vaginal area itchy since BC change, no discharge or odor, tried vagisil wipes didnt help, burning when urinating from itchying      Subjective     Girish is a 21 year old female who presents to clinic today for evaluation of vaginal irritation. Started 3 weeks ago. She has vaginal itching of vulva, no pubic or groin itching. Has not noticed odor or discharge. She does have some pain with urination. Endorses some burning with urination. Same sexual partner for the past 6 years.   Additionally, she has had all over itching for several weeks. NO change in her skin coloring. She denies having dark urine. No abd pain, vomiting or diarrhea. Denies having fever or chills.       No past medical history on file.  Past Surgical History:   Procedure Laterality Date     MYRINGOTOMY, INSERT TUBE, COMBINED  5/16/2011    Procedure:COMBINED MYRINGOTOMY, INSERT TUBE; Surgeon:NEIL MATUTE; Location:[facility] OR     MYRINGOTOMY, INSERT TUBE, COMBINED Left 9/10/2014    Procedure:  COMBINED MYRINGOTOMY, INSERT TUBE;  Surgeon: Gerardo Arellano MD;  Location: [facility] OR     Social History     Tobacco Use     Smoking status: Passive Smoke Exposure - Never Smoker     Smokeless tobacco: Never Used     Tobacco comment: father smokes outside   Substance Use Topics     Alcohol use: Yes     Comment: occ.     Current Outpatient Medications   Medication     clotrimazole-betamethasone (LOTRISONE) 1-0.05 % external cream     levonorgestrel-ethinyl estradiol (AVIANE) 0.1-20 MG-MCG tablet     triamcinolone (KENALOG) 0.1 % external cream     escitalopram (LEXAPRO) 10 MG tablet     No current facility-administered medications for this visit.     Allergies   Allergen Reactions     Soap Rash     Patient states she has sensitive skin and gets rashes from scented soaps.        10 point ROS of systems were all negative except for pertinent positives noted in my HPI.      Exam:   BP (!) 134/94   Pulse 94   Temp 98.4  F (36.9  C)   SpO2 94%   Constitutional: healthy, alert and no distress  Head: Normocephalic, atraumatic.  Cardiovascular: RRR  Respiratory: CTA bilaterally, no rhonchi or rales  Gastrointestinal: soft and nontender  : Vulva has erythema and slight swelling. NO cervical lesions or tenderness. Discharge is thin and whitish.    Skin: no rashes  Neurologic: Speech clear, gait normal. Moves all extremities.    Results for orders placed or performed in visit on 07/16/21   UA macro with reflex to Microscopic and Culture - Clinc Collect     Status: Abnormal    Specimen: Urine, Clean Catch   Result Value Ref Range    Color Urine Yellow Colorless, Straw, Light Yellow, Yellow    Appearance Urine Clear Clear    Glucose Urine Negative Negative mg/dL    Bilirubin Urine Negative Negative    Ketones Urine Negative Negative mg/dL    Specific Gravity Urine 1.015 1.003 - 1.035    Blood Urine Negative Negative    pH Urine 8.0 (H) 5.0 - 7.0    Protein Albumin Urine Negative Negative mg/dL    Urobilinogen Urine 0.2  0.2, 1.0 E.U./dL    Nitrite Urine Negative Negative    Leukocyte Esterase Urine Negative Negative    Narrative    Microscopic not indicated   Comprehensive metabolic panel     Status: Normal   Result Value Ref Range    Sodium 135 133 - 144 mmol/L    Potassium 4.1 3.4 - 5.3 mmol/L    Chloride 104 94 - 109 mmol/L    Carbon Dioxide (CO2) 28 20 - 32 mmol/L    Anion Gap 3 3 - 14 mmol/L    Urea Nitrogen 9 7 - 30 mg/dL    Creatinine 0.70 0.52 - 1.04 mg/dL    Calcium 9.2 8.5 - 10.1 mg/dL    Glucose 83 70 - 99 mg/dL    Alkaline Phosphatase 55 40 - 150 U/L    AST 24 0 - 45 U/L    ALT 18 0 - 50 U/L    Protein Total 7.3 6.8 - 8.8 g/dL    Albumin 4.0 3.4 - 5.0 g/dL    Bilirubin Total 0.7 0.2 - 1.3 mg/dL    GFR Estimate >90 >60 mL/min/1.73m2   CBC with platelets and differential     Status: None   Result Value Ref Range    WBC Count 6.4 4.0 - 11.0 10e3/uL    RBC Count 4.60 3.80 - 5.20 10e6/uL    Hemoglobin 14.4 11.7 - 15.7 g/dL    Hematocrit 42.8 35.0 - 47.0 %    MCV 93 78 - 100 fL    MCH 31.3 26.5 - 33.0 pg    MCHC 33.6 31.5 - 36.5 g/dL    RDW 11.7 10.0 - 15.0 %    Platelet Count 278 150 - 450 10e3/uL    % Neutrophils 61 %    % Lymphocytes 29 %    % Monocytes 9 %    % Eosinophils 2 %    % Basophils 1 %    Absolute Neutrophils 3.9 1.6 - 8.3 10e3/uL    Absolute Lymphocytes 1.8 0.8 - 5.3 10e3/uL    Absolute Monocytes 0.6 0.0 - 1.3 10e3/uL    Absolute Eosinophils 0.1 0.0 - 0.7 10e3/uL    Absolute Basophils 0.0 0.0 - 0.2 10e3/uL   Wet prep - Clinic Collect     Status: Abnormal    Specimen: Vagina; Swab   Result Value Ref Range    Trichomonas Absent Absent    Yeast Absent Absent    Clue Cells Absent Absent    WBCs/high power field 3+ (A) None   CBC with platelets and differential     Status: None    Narrative    The following orders were created for panel order CBC with platelets and differential.  Procedure                               Abnormality         Status                     ---------                               -----------          ------                     CBC with platelets and d...[[id number]]                      Final result                 Please view results for these tests on the individual orders.

## 2021-08-13 ENCOUNTER — HOSPITAL ENCOUNTER (OUTPATIENT)
Dept: NUCLEAR MEDICINE | Facility: CLINIC | Age: 21
Setting detail: NUCLEAR MEDICINE
Discharge: HOME | End: 2021-08-13
Attending: INTERNAL MEDICINE | Admitting: INTERNAL MEDICINE
Payer: COMMERCIAL

## 2021-08-13 DIAGNOSIS — R68.81 EARLY SATIETY: ICD-10-CM

## 2021-08-13 DIAGNOSIS — R14.0 BLOATING: ICD-10-CM

## 2021-08-13 DIAGNOSIS — R19.7 DIARRHEA, UNSPECIFIED TYPE: ICD-10-CM

## 2021-08-13 DIAGNOSIS — Z80.0 FAMILY HISTORY OF COLON CANCER: ICD-10-CM

## 2021-08-13 DIAGNOSIS — R10.84 GENERALIZED ABDOMINAL PAIN: ICD-10-CM

## 2021-08-13 PROCEDURE — 78264 GASTRIC EMPTYING IMG STUDY: CPT

## 2021-08-13 PROCEDURE — A9541 TC99M SULFUR COLLOID: HCPCS | Performed by: INTERNAL MEDICINE

## 2021-08-13 PROCEDURE — 343N000001 HC RX 343: Performed by: INTERNAL MEDICINE

## 2021-08-13 RX ADMIN — Medication 1 MILLICURIE: at 08:43

## 2021-08-18 ENCOUNTER — HOSPITAL ENCOUNTER (OUTPATIENT)
Dept: ULTRASOUND IMAGING | Facility: CLINIC | Age: 21
Discharge: HOME | End: 2021-08-18
Attending: INTERNAL MEDICINE | Admitting: INTERNAL MEDICINE
Payer: COMMERCIAL

## 2021-08-18 DIAGNOSIS — R19.7 DIARRHEA, UNSPECIFIED TYPE: ICD-10-CM

## 2021-08-18 DIAGNOSIS — R10.84 GENERALIZED ABDOMINAL PAIN: ICD-10-CM

## 2021-08-18 DIAGNOSIS — R68.81 EARLY SATIETY: ICD-10-CM

## 2021-08-18 DIAGNOSIS — R14.0 BLOATING: ICD-10-CM

## 2021-08-18 DIAGNOSIS — Z80.0 FAMILY HISTORY OF COLON CANCER: ICD-10-CM

## 2021-08-18 PROCEDURE — 76700 US EXAM ABDOM COMPLETE: CPT

## 2021-08-20 DIAGNOSIS — R10.84 GENERALIZED ABDOMINAL PAIN: Primary | ICD-10-CM

## 2021-08-25 ENCOUNTER — TELEPHONE (OUTPATIENT)
Dept: FAMILY MEDICINE | Facility: CLINIC | Age: 21
End: 2021-08-25

## 2021-08-25 DIAGNOSIS — L30.9 DERMATITIS: Primary | ICD-10-CM

## 2021-08-25 NOTE — TELEPHONE ENCOUNTER
Patient called for a refill on a ketoconazole shampoo for her dermitis please. She has not use it in a while but needs to.  Pharmacy selected.      Elke Kirkland, LYNN Cruz

## 2021-08-25 NOTE — TELEPHONE ENCOUNTER
Call placed to patient   No answer; generic voicemail left requesting call back to Clinic RN at [phone]    Norberto Espinal RN

## 2021-08-25 NOTE — TELEPHONE ENCOUNTER
Routing refill request to provider for review/approval because:  Drug not active on patient's medication list    Norberto Espinal RN

## 2021-08-25 NOTE — TELEPHONE ENCOUNTER
I do not think that the patient I have discussed this in the past.  Can you please get more information?  Potentially it would be appropriate for her to do an E-visit and maybe include some pictures    Thank you    EB

## 2021-08-27 RX ORDER — KETOCONAZOLE 20 MG/ML
SHAMPOO TOPICAL DAILY PRN
Qty: 120 ML | Refills: 1 | Status: SHIPPED | OUTPATIENT
Start: 2021-08-27 | End: 2023-02-22

## 2021-08-27 NOTE — TELEPHONE ENCOUNTER
Patient returned author's call    Patient had been seen by a Dermatologist through [facility] and prescribed the shampoo requested  Patient requesting a refill of medication.   Has tolerated medicated shampoo well    Will forward to Dr. Tellez to review    Norberto Espinal RN

## 2021-09-01 ENCOUNTER — VIRTUAL VISIT (OUTPATIENT)
Dept: GASTROENTEROLOGY | Facility: CLINIC | Age: 21
End: 2021-09-01
Payer: COMMERCIAL

## 2021-09-01 DIAGNOSIS — K21.9 GASTROESOPHAGEAL REFLUX DISEASE, UNSPECIFIED WHETHER ESOPHAGITIS PRESENT: Primary | ICD-10-CM

## 2021-09-01 DIAGNOSIS — R10.84 GENERALIZED ABDOMINAL PAIN: ICD-10-CM

## 2021-09-01 PROCEDURE — 99214 OFFICE O/P EST MOD 30 MIN: CPT | Mod: 95 | Performed by: INTERNAL MEDICINE

## 2021-09-01 NOTE — PROGRESS NOTES
Girish is a 21 year old who is being evaluated via a billable telephone visit.      What phone number would you like to be contacted at? [phone]  How would you like to obtain your AVS? MyChart  Phone call duration:  Minutes  Mir Marie CMA

## 2021-09-01 NOTE — PATIENT INSTRUCTIONS
Try omeprazole (prilosec) - this needs to be taken on an empty stomach and then you should eat 30 to 60 minutes later.  This medication takes time to work so I would like you to stay on it for at least 4-6 weeks to see if it helps you.    Please schedule an endoscopy. If that's normal - we may consider trying a neuromodulator medication

## 2021-09-01 NOTE — PROGRESS NOTES
HPI:    Girish presents today for a telephone visit for follow-up.  GES was normal, US was normal and labs including CRP, celiac serologies were all normal.  Feels like food isn't digesting well - wakes up feeling like food has been sitting in her throat all night.  Also has abdominal pain and bloating after eating.      Diarrhea has improved since stopping pepcid. Never tried the omeprazole that was previously recommended as she wanted to discuss possible side effects first.     PMH:  anxiety    Past Surgical History:   Procedure Laterality Date     MYRINGOTOMY, INSERT TUBE, COMBINED  5/16/2011    Procedure:COMBINED MYRINGOTOMY, INSERT TUBE; Surgeon:GERARDO MATUTE; Location:[facility]     MYRINGOTOMY, INSERT TUBE, COMBINED Left 9/10/2014    Procedure: COMBINED MYRINGOTOMY, INSERT TUBE;  Surgeon: Gerardo Matute MD;  Location: [facility] OR       Family History   Problem Relation Age of Onset     Hypertension Mother      Lung Cancer Mother         approx age 45     Colon Cancer Mother      Other Cancer Mother         liver     Cancer - colorectal Maternal Grandmother      Breast Cancer Paternal Grandmother      Depression Paternal Grandmother      Prostate Cancer Paternal Grandfather      Heart Disease Paternal Grandfather      Depression Paternal Grandfather      Heart Disease Paternal Uncle 52        heart attack      Heart Disease Maternal Grandfather      Depression Father      Prostate Cancer Father      Anxiety Disorder Sister      C.A.D. No family hx of      Diabetes No family hx of      Cerebrovascular Disease No family hx of        Social History     Tobacco Use     Smoking status: Passive Smoke Exposure - Never Smoker     Smokeless tobacco: Never Used     Tobacco comment: father smokes outside   Substance Use Topics     Alcohol use: Yes     Comment: occ.        Assessment and Plan:    # globus sensation, early satiety, post prandial abdominal pain  - work-up to date unrevealing.  Possibly functional  dyspepsia.  Discussed with patient today - will plan for EGD for further evaluation - if the returns normal - may consider a trial of a neuromodulator.  In the interim - can give a trial of omeprazole and monitor response.    # diarrhea - likely IBS-D - resolved at present.  Continue fiber supplements.      RTC after EGD    Guera Willett DO     Phone call duration: 14 minutes

## 2021-09-18 ENCOUNTER — HEALTH MAINTENANCE LETTER (OUTPATIENT)
Age: 21
End: 2021-09-18

## 2021-10-22 ENCOUNTER — OFFICE VISIT (OUTPATIENT)
Dept: FAMILY MEDICINE | Facility: CLINIC | Age: 21
End: 2021-10-22
Payer: COMMERCIAL

## 2021-10-22 VITALS
HEIGHT: 62 IN | WEIGHT: 130 LBS | SYSTOLIC BLOOD PRESSURE: 122 MMHG | OXYGEN SATURATION: 99 % | HEART RATE: 105 BPM | TEMPERATURE: 97.9 F | DIASTOLIC BLOOD PRESSURE: 68 MMHG | BODY MASS INDEX: 23.92 KG/M2

## 2021-10-22 DIAGNOSIS — N89.8 VAGINAL ITCHING: Primary | ICD-10-CM

## 2021-10-22 DIAGNOSIS — N76.0 BV (BACTERIAL VAGINOSIS): ICD-10-CM

## 2021-10-22 DIAGNOSIS — B96.89 BV (BACTERIAL VAGINOSIS): ICD-10-CM

## 2021-10-22 DIAGNOSIS — B37.31 CANDIDIASIS OF VAGINA: ICD-10-CM

## 2021-10-22 LAB
CLUE CELLS: PRESENT
TRICHOMONAS, WET PREP: ABNORMAL
WBC'S/HIGH POWER FIELD, WET PREP: ABNORMAL
YEAST, WET PREP: PRESENT

## 2021-10-22 PROCEDURE — 99214 OFFICE O/P EST MOD 30 MIN: CPT | Performed by: PHYSICIAN ASSISTANT

## 2021-10-22 PROCEDURE — 87210 SMEAR WET MOUNT SALINE/INK: CPT | Performed by: PHYSICIAN ASSISTANT

## 2021-10-22 RX ORDER — METRONIDAZOLE 500 MG/1
500 TABLET ORAL 2 TIMES DAILY
Qty: 14 TABLET | Refills: 0 | Status: SHIPPED | OUTPATIENT
Start: 2021-10-22 | End: 2021-10-29

## 2021-10-22 RX ORDER — FLUCONAZOLE 150 MG/1
150 TABLET ORAL
Qty: 4 TABLET | Refills: 0 | Status: SHIPPED | OUTPATIENT
Start: 2021-10-22 | End: 2021-11-01

## 2021-10-22 ASSESSMENT — PAIN SCALES - GENERAL: PAINLEVEL: NO PAIN (0)

## 2021-10-22 ASSESSMENT — MIFFLIN-ST. JEOR: SCORE: 1307.93

## 2021-10-22 NOTE — PROGRESS NOTES
Assessment & Plan     (N89.8) Vaginal itching  (primary encounter diagnosis)  Comment: unclear cause - she links it starting to when she started the pill (vs nexplanon) which is possible due to change in hormones and probably pH adjustments/changes. However the pill is working well for her so not super excited to switch that up if possible. We did end up repeating the wet prep again today and this time it did show clue cells and yeast so we discussed treating this for at least contributing symptoms - unclear if this is the whole cause or not. Maybe it was missed back in July? Either way, discussed seeing how much or what improves with treatment of the yeast and BV before deciding if she wants to change the birth control   Plan: Wet prep - Clinic Collect            (N76.0,  B96.89) BV (bacterial vaginosis)  Comment:   Plan: metroNIDAZOLE (FLAGYL) 500 MG tablet            (B37.3) Candidiasis of vagina  Comment:   Plan: fluconazole (DIFLUCAN) 150 MG tablet                  35 minutes spent on the date of the encounter doing chart review, review of outside records, review of test results, interpretation of tests, patient visit and documentation            No follow-ups on file.    JACKIE Velazquez [facility] BIBI Stout is a 21 year old who presents for the following health issues     HPI     Vaginal Symptoms  Onset/Duration: 4 months   Description:  Vaginal Discharge: none   Itching (Pruritis): YES  Burning sensation:  no  Odor: no  Accompanying Signs & Symptoms:  Urinary symptoms: no  Abdominal pain: no  Fever: no  History:   Sexually active: YES  New Partner: no  Possibility of Pregnancy:  no  Recent antibiotic use: no  Previous vaginitis issues: no  Precipitating or alleviating factors: None  Therapies tried and outcome: none      Persistent vaginal itching  She noticed it started around the time she was put on the pill  Also started lexapro around that time as well  Was seen  "in [facility] in the [facility] - given lotrisone - used it as prescribed with no improvement  Has since been using aquaphor and did try the kenalog she has for eczema on other spots on her body  Helps a little but still fairly persistent itching  Worse at night   Worse when it is exposed to air. Also was really bad recently when she was in [place] swimming  Itchy when she is wearing clothes but doesn't see as bad    She did recently shave all the hair in the area for her trip to [place] but states usually she just leaves it alone  Itching has been there the entire time      Review of Systems   Remainder of ROS obtained and found to be negative other than that which was documented above        Objective    /68   Pulse 105   Temp 97.9  F (36.6  C) (Tympanic)   Ht 1.575 m (5' 2\")   Wt 59 kg (130 lb)   SpO2 99%   BMI 23.78 kg/m    Body mass index is 23.78 kg/m .  Physical Exam   GENERAL: healthy, alert and no distress  : labia with some mild edema and erythema. No lesions or sores noted. Appears slightly swollen. Not tender to touch and no discharge noted    Diagnostic Tests:   Wet Prep: + clue cells and yeast              "

## 2021-11-09 ENCOUNTER — TELEPHONE (OUTPATIENT)
Dept: FAMILY MEDICINE | Facility: CLINIC | Age: 21
End: 2021-11-09
Payer: COMMERCIAL

## 2021-11-09 DIAGNOSIS — N89.8 VAGINAL ITCHING: Primary | ICD-10-CM

## 2021-11-09 NOTE — TELEPHONE ENCOUNTER
Patient called and says she did all the medication for her vaginal itching and it still has not gone away she is asking if you want and try to change her birth control or what is the next step to take.  If you send new birth control she wants it at the [facility] in [place].  Ok to LM for patient if she don't answer with decision.        Elke Kirkland, LYNN Cruz

## 2021-11-09 NOTE — TELEPHONE ENCOUNTER
Call placed to patient.  Detailed voicemail message left per patient request.    Relayed message per MIGUE Coughlin.  Scheduling number given for lab only appointment.  Ashleigh Foy RN

## 2021-11-09 NOTE — TELEPHONE ENCOUNTER
Did it get any better when she was on the medications?     It might be worth having her come for a lab only - self wet prep visit so we can be sure that everything we were treating has cleared    Jessica

## 2021-11-16 ENCOUNTER — LAB (OUTPATIENT)
Dept: LAB | Facility: CLINIC | Age: 21
End: 2021-11-16
Payer: COMMERCIAL

## 2021-11-16 DIAGNOSIS — N89.8 VAGINAL ITCHING: ICD-10-CM

## 2021-11-16 LAB
CLUE CELLS: ABNORMAL
TRICHOMONAS, WET PREP: ABNORMAL
WBC'S/HIGH POWER FIELD, WET PREP: ABNORMAL
YEAST, WET PREP: ABNORMAL

## 2021-11-16 PROCEDURE — 87210 SMEAR WET MOUNT SALINE/INK: CPT

## 2021-11-18 ENCOUNTER — TELEPHONE (OUTPATIENT)
Dept: NURSING | Facility: CLINIC | Age: 21
End: 2021-11-18
Payer: COMMERCIAL

## 2021-11-18 DIAGNOSIS — L29.0 ANAL ITCHING: Primary | ICD-10-CM

## 2021-11-18 RX ORDER — ALBENDAZOLE 200 MG/1
400 TABLET, FILM COATED ORAL
Qty: 4 TABLET | Refills: 0 | Status: SHIPPED | OUTPATIENT
Start: 2021-11-18 | End: 2021-12-03

## 2021-11-18 NOTE — TELEPHONE ENCOUNTER
Patient calling with question about her quarantine. Patient is covid positive with loss of smell and loss of taste. 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 Informed that we don't retest as your test will show positive for 90 days.   No further questions.   Isabel Dubois RN   11/18/21 4:40 PM  [facility] Nurse Advisor

## 2021-11-19 ENCOUNTER — TELEPHONE (OUTPATIENT)
Dept: FAMILY MEDICINE | Facility: CLINIC | Age: 21
End: 2021-11-19
Payer: COMMERCIAL

## 2021-11-19 NOTE — TELEPHONE ENCOUNTER
albendazole (ALBENZA) 200 MG tablet 4 tablet 0 11/18/2021 12/3/2021     PA is needed    [email or web address]/login    Key: [id number]

## 2021-11-22 NOTE — TELEPHONE ENCOUNTER
PA Initiation    Medication: albendazole (ALBENZA) 200 MG tablet4 [id number]/18/202112/3/2021  Insurance Company: [employer] - Phone [phone] Fax [phone]  Pharmacy Filling the Rx: [facility] [address] [place] [address] - [facility] [address]  Filling Pharmacy Phone: [phone]  Filling Pharmacy Fax: [phone]  Start Date: 11/22/2021

## 2021-11-23 NOTE — TELEPHONE ENCOUNTER
Prior Authorization Approval    Authorization Effective Date:    Authorization Expiration Date:    Medication: albendazole (ALBENZA) 200 MG tablet4 [id number]/18/202112/3/2021  Approved Dose/Quantity:   Reference #: [id number]   Insurance Company: [employer] - Phone [phone] Fax [phone]  Which Pharmacy is filling the prescription (Not needed for infusion/clinic administered): [facility] [address] [place] [address] [address]  Pharmacy Notified: Yes  Patient Notified: Yes

## 2021-11-24 ENCOUNTER — TELEPHONE (OUTPATIENT)
Dept: FAMILY MEDICINE | Facility: CLINIC | Age: 21
End: 2021-11-24
Payer: COMMERCIAL

## 2021-11-24 DIAGNOSIS — L29.0 ANAL ITCHING: ICD-10-CM

## 2021-11-24 NOTE — TELEPHONE ENCOUNTER
albendazole (ALBENZA) 200 MG tablet 4 tablet 0 11/18/2021 12/3/2021     PA is needed for medication. Go to [email or web address]/login    Key: [id number]

## 2021-12-13 DIAGNOSIS — F41.9 ANXIETY: ICD-10-CM

## 2021-12-13 RX ORDER — ESCITALOPRAM OXALATE 10 MG/1
10 TABLET ORAL DAILY
Qty: 90 TABLET | Refills: 0 | Status: SHIPPED | OUTPATIENT
Start: 2021-12-13 | End: 2022-02-18

## 2021-12-13 NOTE — TELEPHONE ENCOUNTER
Routing escitalopram refill request to Provider because:    PHQ 2/21/2020 2/9/2021 12/13/2021   PHQ-9 Total Score 19 12 7   Q9: Thoughts of better off dead/self-harm past 2 weeks Not at all Not at all Not at all     HERACLIO-7 SCORE 2/21/2020 2/9/2021 12/13/2021   Total Score - - 16 (severe anxiety)   Total Score 21 21 16       Thank you.  Aram Tolliver, RN

## 2022-01-08 ENCOUNTER — HEALTH MAINTENANCE LETTER (OUTPATIENT)
Age: 22
End: 2022-01-08

## 2022-01-11 ENCOUNTER — OFFICE VISIT (OUTPATIENT)
Dept: FAMILY MEDICINE | Facility: CLINIC | Age: 22
End: 2022-01-11
Payer: COMMERCIAL

## 2022-01-11 VITALS
HEIGHT: 62 IN | TEMPERATURE: 98.5 F | HEART RATE: 109 BPM | OXYGEN SATURATION: 99 % | SYSTOLIC BLOOD PRESSURE: 128 MMHG | DIASTOLIC BLOOD PRESSURE: 80 MMHG | BODY MASS INDEX: 24.36 KG/M2 | RESPIRATION RATE: 16 BRPM | WEIGHT: 132.38 LBS

## 2022-01-11 DIAGNOSIS — L20.82 FLEXURAL ECZEMA: ICD-10-CM

## 2022-01-11 DIAGNOSIS — R41.840 ATTENTION AND CONCENTRATION DEFICIT: ICD-10-CM

## 2022-01-11 DIAGNOSIS — R10.84 GENERALIZED ABDOMINAL PAIN: ICD-10-CM

## 2022-01-11 DIAGNOSIS — F41.1 GENERALIZED ANXIETY DISORDER: ICD-10-CM

## 2022-01-11 DIAGNOSIS — F33.41 RECURRENT MAJOR DEPRESSIVE DISORDER, IN PARTIAL REMISSION (H): Primary | ICD-10-CM

## 2022-01-11 PROCEDURE — 99214 OFFICE O/P EST MOD 30 MIN: CPT | Performed by: FAMILY MEDICINE

## 2022-01-11 RX ORDER — TRIAMCINOLONE ACETONIDE 1 MG/G
CREAM TOPICAL 2 TIMES DAILY
Qty: 80 G | Refills: 0 | Status: CANCELLED | OUTPATIENT
Start: 2022-01-11

## 2022-01-11 RX ORDER — TRIAMCINOLONE ACETONIDE 5 MG/G
CREAM TOPICAL 2 TIMES DAILY
Qty: 15 G | Refills: 3 | Status: SHIPPED | OUTPATIENT
Start: 2022-01-11 | End: 2022-07-19

## 2022-01-11 ASSESSMENT — MIFFLIN-ST. JEOR: SCORE: 1318.7

## 2022-01-11 NOTE — PROGRESS NOTES
"Assessment/Plan:    Girish Calderón is a 21 year old female presenting for:    Recurrent major depressive disorder, in partial remission (H)  Doing well and stable    Generalized anxiety disorder  Lexapro seems to help significantly. We did discuss increasing the medication but she would like to leave it where it is now.    Attention and concentration deficit  She will get a formal evaluation for ADHD. I suspect that this might be the case. If she is positive for ADHD I would be happy to prescribe medication for her.  - Adult Mental Health Referral    Flexural eczema  Triamcinolone 0.5% sent to the pharmacy. Discussed risk and benefits of the medication. Discussed using this sparingly and not getting it on her face.  - triamcinolone (ARISTOCORT HP) 0.5 % external cream  Dispense: 15 g; Refill: 3      Medications Discontinued During This Encounter   Medication Reason     clotrimazole-betamethasone (LOTRISONE) 1-0.05 % external cream      psyllium (METAMUCIL/KONSYL) capsule            Chief Complaint:  Recheck Medication and Anxiety        Subjective:   Girish Calderón is a pleasant 21-year-old female presenting to the clinic today with concerns over anxiety.    Patient has a past medical history significant for anxiety. She is currently on Lexapro. She states that the Lexapro helps significantly with her \"anxious thoughts\" but she also notes that she has been told that she has \"too much energy\" and she has \"scattered.\"    Patient gives several examples of how which she has a difficult time completing different tasks. She states that this dates back to middle school and high school although she was never treated for it then. 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 She states that she is a perfectionist but sometimes takes a long time to get things done because she cannot focus on 1 thing.    12 point review of systems completed and " "negative except for what has been described above    History   Smoking Status     Passive Smoke Exposure - Never Smoker   Smokeless Tobacco     Never Used     Comment: father smokes outside         Current Outpatient Medications:      escitalopram (LEXAPRO) 10 MG tablet, Take 1 tablet (10 mg) by mouth daily, Disp: 90 tablet, Rfl: 0     ketoconazole (NIZORAL) 2 % external shampoo, Apply topically daily as needed for itching or irritation, Disp: 120 mL, Rfl: 1     levonorgestrel-ethinyl estradiol (AVIANE) 0.1-20 MG-MCG tablet, Take 1 tablet by mouth daily, Disp: 84 tablet, Rfl: 4     omeprazole (PRILOSEC) 20 MG DR capsule, Take 1 capsule (20 mg) by mouth daily, Disp: 30 capsule, Rfl: 3     triamcinolone (ARISTOCORT HP) 0.5 % external cream, Apply topically 2 times daily To eczema on arm, Disp: 15 g, Rfl: 3     triamcinolone (KENALOG) 0.1 % external cream, Apply topically 2 times daily, Disp: 80 g, Rfl: 0      Objective:  Vitals:    01/11/22 1000   BP: 128/80   Pulse: 109   Resp: 16   Temp: 98.5  F (36.9  C)   TempSrc: Tympanic   SpO2: 99%   Weight: 60 kg (132 lb 6 oz)   Height: 1.575 m (5' 2\")       Body mass index is 24.21 kg/m .    Vital signs reviewed and stable  General: No acute distress  Psych: Appropriate affect  HEENT: moist mucous membranes, pupils equal, round, reactive to light and accomodation, tympanic membranes are pearly grey bilaterally  Lymph: no cervical or supraclavicular lymphadenopathy  Cardiovascular: regular rate and rhythm with no murmur  Pulmonary: clear to auscultation bilaterally with no wheeze  Abdomen: soft, non tender, non distended with normo-active bowel sounds  Extremities: warm and well perfused with no edema  Skin: warm and dry with no rash         This note has been dictated and transcribed using voice recognition software.   Any errors in transcription are unintentional and inherent to the software.  "

## 2022-02-07 ASSESSMENT — PATIENT HEALTH QUESTIONNAIRE - PHQ9
10. IF YOU CHECKED OFF ANY PROBLEMS, HOW DIFFICULT HAVE THESE PROBLEMS MADE IT FOR YOU TO DO YOUR WORK, TAKE CARE OF THINGS AT HOME, OR GET ALONG WITH OTHER PEOPLE: VERY DIFFICULT
SUM OF ALL RESPONSES TO PHQ QUESTIONS 1-9: 12
SUM OF ALL RESPONSES TO PHQ QUESTIONS 1-9: 12

## 2022-02-07 ASSESSMENT — ANXIETY QUESTIONNAIRES
2. NOT BEING ABLE TO STOP OR CONTROL WORRYING: NEARLY EVERY DAY
1. FEELING NERVOUS, ANXIOUS, OR ON EDGE: NEARLY EVERY DAY
7. FEELING AFRAID AS IF SOMETHING AWFUL MIGHT HAPPEN: MORE THAN HALF THE DAYS
6. BECOMING EASILY ANNOYED OR IRRITABLE: SEVERAL DAYS
GAD7 TOTAL SCORE: 18
7. FEELING AFRAID AS IF SOMETHING AWFUL MIGHT HAPPEN: MORE THAN HALF THE DAYS
3. WORRYING TOO MUCH ABOUT DIFFERENT THINGS: NEARLY EVERY DAY
GAD7 TOTAL SCORE: 18
5. BEING SO RESTLESS THAT IT IS HARD TO SIT STILL: NEARLY EVERY DAY
GAD7 TOTAL SCORE: 18
4. TROUBLE RELAXING: NEARLY EVERY DAY

## 2022-02-08 ENCOUNTER — DOCUMENTATION ONLY (OUTPATIENT)
Dept: PSYCHOLOGY | Facility: CLINIC | Age: 22
End: 2022-02-08
Payer: COMMERCIAL

## 2022-02-08 ENCOUNTER — VIRTUAL VISIT (OUTPATIENT)
Dept: PSYCHOLOGY | Facility: CLINIC | Age: 22
End: 2022-02-08
Attending: FAMILY MEDICINE
Payer: COMMERCIAL

## 2022-02-08 DIAGNOSIS — R41.840 ATTENTION AND CONCENTRATION DEFICIT: ICD-10-CM

## 2022-02-08 DIAGNOSIS — F33.1 MAJOR DEPRESSIVE DISORDER, RECURRENT EPISODE, MODERATE (H): ICD-10-CM

## 2022-02-08 DIAGNOSIS — F41.1 GENERALIZED ANXIETY DISORDER: Primary | ICD-10-CM

## 2022-02-08 PROCEDURE — 90791 PSYCH DIAGNOSTIC EVALUATION: CPT | Mod: 95 | Performed by: PSYCHOLOGIST

## 2022-02-08 ASSESSMENT — COLUMBIA-SUICIDE SEVERITY RATING SCALE - C-SSRS
1. IN THE PAST MONTH, HAVE YOU WISHED YOU WERE DEAD OR WISHED YOU COULD GO TO SLEEP AND NOT WAKE UP?: YES
ATTEMPT PAST THREE MONTHS: NO
4. HAVE YOU HAD THESE THOUGHTS AND HAD SOME INTENTION OF ACTING ON THEM?: NO
5. HAVE YOU STARTED TO WORK OUT OR WORKED OUT THE DETAILS OF HOW TO KILL YOURSELF? DO YOU INTEND TO CARRY OUT THIS PLAN?: NO
REASONS FOR IDEATION LIFETIME: EQUALLY TO GET ATTENTION, REVENGE OR A REACTION FROM OTHERS AND TO END/STOP THE PAIN
6. HAVE YOU EVER DONE ANYTHING, STARTED TO DO ANYTHING, OR PREPARED TO DO ANYTHING TO END YOUR LIFE?: NO
ATTEMPT LIFETIME: NO
1. IN THE PAST MONTH, HAVE YOU WISHED YOU WERE DEAD OR WISHED YOU COULD GO TO SLEEP AND NOT WAKE UP?: NO
5. HAVE YOU STARTED TO WORK OUT OR WORKED OUT THE DETAILS OF HOW TO KILL YOURSELF? DO YOU INTEND TO CARRY OUT THIS PLAN?: NO
TOTAL  NUMBER OF ABORTED OR SELF INTERRUPTED ATTEMPTS PAST LIFETIME: NO
6. HAVE YOU EVER DONE ANYTHING, STARTED TO DO ANYTHING, OR PREPARED TO DO ANYTHING TO END YOUR LIFE?: NO
2. HAVE YOU ACTUALLY HAD ANY THOUGHTS OF KILLING YOURSELF?: NO
3. HAVE YOU BEEN THINKING ABOUT HOW YOU MIGHT KILL YOURSELF?: NO
2. HAVE YOU ACTUALLY HAD ANY THOUGHTS OF KILLING YOURSELF LIFETIME?: NO
TOTAL  NUMBER OF INTERRUPTED ATTEMPTS LIFETIME: NO
TOTAL  NUMBER OF INTERRUPTED ATTEMPTS PAST 3 MONTHS: NO
4. HAVE YOU HAD THESE THOUGHTS AND HAD SOME INTENTION OF ACTING ON THEM?: NO
TOTAL  NUMBER OF ABORTED OR SELF INTERRUPTED ATTEMPTS PAST 3 MONTHS: NO

## 2022-02-08 ASSESSMENT — ANXIETY QUESTIONNAIRES: GAD7 TOTAL SCORE: 18

## 2022-02-08 ASSESSMENT — PATIENT HEALTH QUESTIONNAIRE - PHQ9: SUM OF ALL RESPONSES TO PHQ QUESTIONS 1-9: 12

## 2022-02-08 NOTE — PROGRESS NOTES
Name: Girish Calderón  MRN: [id number]  : 2000    Client completed the [place] Multiphasic Personality Inventory-2 (MMPI-2), a self-report personality inventory, as part of her evaluation. Validity scales indicate that the client responded in an open and consistent manner, resulting in a valid profile. While overreporting is possible, it is also likely that the Client has limited resources for coping with the stresses and demands of daily life. The following results should be interpreted with caution and in light of other information. Her profile reflects a moderate level of general emotional distress. Individuals with similar profiles have a highly general disposition toward negative emotionality or a vulnerability to anxiety, apprehension, dread, discomfort, distress, uncertainty, and tension. They are likely nervous and experience sleep disturbance and have issues with attention and concentration.   They may feel stressed out and vulnerable to upset by disappointment and decisions that don t work out. Individuals with similar profiles have a low threshold for boredom, are intolerant of frustration, are excitable and overreactive, and seemingly unable to modulate impulses. They may be non-conforming, sensation seeking, and have a tendency to sacrifice long-term goals for short-term satisfactions. They admit to past delinquency and problems with authority. Individuals with similar profiles also experience problems with memory, and concentration. They likely experience a loss of interest that defeats the completion - even the initiation - of mental and behavioral projects. Individuals with similar profiles are likely troubled by a lack of cognitive control, with ruminations and preoccupations they know to be undirected, fruitless and largely irrational. They report impediments to performance in the workplace including fatigue, apathy, feeling overwhelmed, indecisive and distractibility. They likely  present as disorganized with poor concentration and judgment. Individuals with similar profiles experience overly busy but massively inefficient cognitive activity. They likely experience obsessiveness and are preoccupied with details. They are likely overwhelmed by the endless supply of considerations brought to bear in decision making.

## 2022-02-08 NOTE — PROGRESS NOTES
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 loses things necessary for tasks or activities  - Is often easily distractedby extraneous stimuli  - Is often forgetful in daily activities  - Often fidgets with or taps hands or feet or squirms in seat  - Often leaves seat in situations when remaining seated is expected  - Often runs about or climbs in situationswhere it is inappropriate  - Often unable to play or engage in leisure activities quietly  - Is often \"on the go,\" acting as if \"driven by a motor\"  - Often talks excessively  - Often blurts out an answer before a question has been completed    Functional Status:  Patient reports the following functional impairments:  management of the household and or completion of tasks, self-care, social interactions and work / vocational responsibilities.         Clinical Summary:  1. Reason for assessment: ADHD Evaluation  .  2. Psychosocial, Cultural and Contextual Factors: history of trauma (loss); recently started school .  3. Principal DSM5 Diagnoses  (Sustained by DSM5 Criteria Listed Above):   296.32 (F33.1) Major Depressive Disorder, Recurrent Episode, Moderate _  300.02 (F41.1) Generalized Anxiety Disorder.  RULE OUT: ADHD  6. Prognosis: Expect Improvement and Maintain Current Status / Prevent Deterioration.  7. " Likely consequences of symptoms if not treated: issues at school/work/home.  8. Client strengths include:  creative, empathetic, employed, goal-focused, good listener, has a previous history of therapy, insightful, intelligent, motivated, open to learning, open to suggestions / feedback, support of family, friends and providers, supportive, wants to learn, willing to ask questions and willing to relate to others .     Recommendations:     1. Plan for Safety and Risk Management:   Recommended that patient call 911 or go to the local ED should there be a change in any of these risk factors..  Report to child / adult protection services was NA.     4. Resources/Service Plan:    services are not indicated.   Modifications to assist communication are not indicated.   Additional disability accommodations are not indicated.      5. Collaboration:   Collaboration / coordination of treatment will be initiated with the following  support professionals: primary care physician.      6.  Referrals:   The following referral(s) will be initiated: NA. Next Scheduled Appointment: NA. Client would like to do therapy but doesn't have time or money to do so right now. She is open to it and she is not opposed to it.       A Release of Information has been obtained for the following: NA.    7. HEATHER:    HEATHER:  Discussed the general effects of drugs and alcohol on health and well-being. Provider gave patient printed information about the effects of chemical use on their health and well being. Recommendations:  NA .     8. Records:   These were reviewed at time of assessment.   Information in this assessment was obtained from the medical record and  provided by patient who is a good historian.    Patient will have open access to their mental health medical record.        Provider Name/ Credentials:  Natividad Kelley, PhD, LP  February 8, 2022

## 2022-02-14 ENCOUNTER — TELEPHONE (OUTPATIENT)
Dept: GASTROENTEROLOGY | Facility: CLINIC | Age: 22
End: 2022-02-14
Payer: COMMERCIAL

## 2022-02-14 NOTE — TELEPHONE ENCOUNTER
Caller: Girish Calderón      Procedure: EGD    Date, Location, and Surgeon of Procedure Cancelled: 3/18/22,Bartolome [facility]    Ordering Provider:Bartolome    Reason for cancel (please be detailed, any staff messages or encounters to note?): Patient wanted an earlier time for that day.        Rescheduled: y     If rescheduled:    Date: 3/18   Location:    Note any change or update to original order/sedation:                   Detail Level: Detailed

## 2022-02-15 ENCOUNTER — VIRTUAL VISIT (OUTPATIENT)
Dept: PSYCHOLOGY | Facility: CLINIC | Age: 22
End: 2022-02-15
Payer: COMMERCIAL

## 2022-02-15 ENCOUNTER — DOCUMENTATION ONLY (OUTPATIENT)
Dept: PSYCHOLOGY | Facility: CLINIC | Age: 22
End: 2022-02-15
Payer: COMMERCIAL

## 2022-02-15 DIAGNOSIS — R41.840 ATTENTION AND CONCENTRATION DEFICIT: ICD-10-CM

## 2022-02-15 DIAGNOSIS — F41.1 GENERALIZED ANXIETY DISORDER: Primary | ICD-10-CM

## 2022-02-15 DIAGNOSIS — F33.1 MAJOR DEPRESSIVE DISORDER, RECURRENT EPISODE, MODERATE (H): ICD-10-CM

## 2022-02-15 PROCEDURE — 90832 PSYTX W PT 30 MINUTES: CPT | Mod: 95 | Performed by: PSYCHOLOGIST

## 2022-02-15 NOTE — PROGRESS NOTES
"Client Name: Girish Calderón    MRN: [id number]  : 2000    Carlee Adult ADHD Rating Scale-IV: Self and Other Reports (BAARS-IV)  The BAARS-IV assesses for symptoms of ADHD that are experienced in one's daily life. This assessment measure includes self and collateral rating scales designed to provide information regarding current and childhood symptoms of ADHD including inattention, hyperactivity, and impulsivity. Self-report scores are reported as percentiles. Scores at the 76th-83rd percentile are considered marginal, scores at the 84th-92nd percentile are considered borderline, scores at the 93rd-95th percentile are considered mild, scores at the 96th-98th percentile are considered moderate, and those at the 99th percentile are considered severe. Collateral or \"other\" rating scales are reported as number of symptoms observed in comparison to those reported by the client. Norms and percentile scores are not available for collateral reports.      Current Symptoms Scale--Self Report:   Client completed the self-report inventory of current symptoms. The results indicate that the client's Total ADHD Score was 63 which places her in the 99th percentile for overall ADHD symptoms. In addition, the client endorsed the following occur \"often\" or \"very often\": 5/9 (96th percentile) Inattention symptoms, 9/9 (99th percentile) Hyperactivity/Impulsivity symptoms, and 4/9 (93rd percentile) Sluggish Cognitive Tempo symptoms. Client indicated that the reported symptoms have resulted in impaired functioning in school, home, work, and social relationships. Overall, the results suggest the client is reporting moderate symptoms of inattention and severe symptoms of hyperactivity/impulsivity at this time.      Current Symptoms Scale--Other Report:  Client's sister completed the collateral report inventory of current symptoms. Based on the collateral contact's observation of symptoms, the client demonstrates the following " "\"often\" or \"very often\": 4/9 Inattention symptoms, 5/5 Hyperactivity symptoms, 4/4 Impulsivity symptoms, and 2/9 Sluggish Cognitive Tempo symptoms. The client's Total ADHD Score was 58. The collateral contact indicated the client demonstrates impaired functioning in school, work, home and social relationships. The collateral- and self-report are similar and suggest she experiences symptoms of inattention and hyperactivity/impulsivity at this time.      Childhood Symptoms Scale--Self-Report:  Client completed the self-report inventory of childhood symptoms. The results indicate that the client's Total ADHD Score was 66 which places her in the 99th percentile for overall ADHD symptoms in childhood. In addition, the client endorsed having experienced the following \"often\" or \"very often\": 6/9 (93rd percentile) Inattention symptoms and 9/9 (99th percentile) Hyperactivity-Impulsivity symptoms. Client indicated that the reported symptoms resulted in impaired functioning in school, social relationships and home. Overall, the results suggest the Client reported experiencing mild symptoms of inattention and severe symptoms of inattention as a child.     Childhood Symptoms Scale--Other Report:  Client's father completed the collateral report inventory of childhood symptoms. Based on the collateral contact's recollection of client's childhood symptoms, the client demonstrated the following \"often\" or \"very often\": 8/9 Inattention symptoms and 9/9 Hyperactivity-Impulsivity symptoms. The client's Total ADHD Score was 67. The collateral-report and self-report scores are similar and suggest Client experienced symptoms of inattention and hyperactivity/impulsivity in childhood.       Carlee Functional Impairment Scale: Self and Other Reports (BFIS)  The BFIS is used to assess an individuals' psychosocial impairment in major life/daily activities that may be due to a mental health disorder. This assessment measure includes self and " "collateral rating scales. Self-report scores are reported as percentiles. Scores at the 76th-83rd percentile are considered marginal, scores at the 84th-92nd percentile are considered borderline, scores at the 93rd-95th percentile are considered mild, scores at the 96th-98th percentile are considered moderate, and those at the 99th percentile are considered severe. Collateral or \"other\" rating scales are reported as number of symptoms observed in comparison to those reported by the client. Norms and percentile scores are not available for collateral reports.      Results indicate the client identified impairment (scores at or greater than 93rd percentile) in the following areas: home-chores, work, social-strangers, social-friends, education, money management, and driving. The client's Mean Impairment Score was 5.54 (93rd percentile) indicating the client is reporting mild impairment in functioning across domains. Client s sister completed the collateral report scale for this measure. Her scores were somewhat discrepant. Her scores were generally lower (Mean Impairment Score of 4.31). She noted impairment in the areas of: home-chores, social-strangers, and social-friends.        Carlee Deficits in Executive Functioning Scale (BDEFS)  The BDEFS is a measure used for evaluating dimensions of adult executive functioning in daily life. This assessment measure includes self and collateral rating scales. Self-report scores are reported as percentiles. Scores at the 76th-83rd percentile are considered marginal, scores at the 84th-92nd percentile are considered borderline, scores at the 93rd-95th percentile are considered mild, scores at the 96th-98th percentile are considered moderate, and those at the 99th percentile are considered severe. Collateral or \"other\" rating scales are reported as number of symptoms observed in comparison to those reported by the client. Norms and percentile scores are not available for " collateral reports.      Results indicate the client's Total Executive Functioning Score was 242 (99th percentile). The ADHD-Executive Functioning Index score was 33 (99th percentile). The These scores suggest the client has severe deficits in executive functioning. She endorsed the following: self-management to time (mild); self-organization/problem-solving (severe); self-restraint (moderate); self-motivation (mild); and self-regulation of emotions (mild). Client's sister completed the collateral rating scale, which indicated somewhat discrepant results. His scores were generally lower. She noted impairment in self-organization/problem-solving, self-restraint and self-motivation.    Generalized Anxiety Disorder Questionnaire (HERACLIO-7)  This questionnaire is designed to screen for anxiety in adults. Based on the client's score of 19, she is reporting severe symptoms of anxiety at this time. Client identified the following symptoms of anxiety: feeling nervous, anxious or on edge; not being able to stop or control worrying, worrying too much about different things, trouble relaxing, being so restless it s hard to sit still, becoming easily annoyed or irritable, and feeling afraid as if something awful might happen.    Patient Health Questionnaire- 9 (PHQ-9)   This questionnaire is designed to screen for depression in adults. Based on the client's score of 14, she is reporting moderate symptoms of depression at this time. Symptoms endorsed include: little interest or pleasure in doing things; trouble falling asleep, staying asleep, or sleeping too much; feeling tired or having little energy; poor appetite or overeating, poor concentration and feeling fidgety/restless.

## 2022-02-15 NOTE — PROGRESS NOTES
Progress Note     Client Name:  Girish Calderón Date: 2/15/2022       Service Type: Individual  Video Visit: Yes, the patient's condition can be safely assessed and treated via synchronous audio and visual telemedicine encounter.      Reason for Video Visit: Services only offered telehealth    Location of Originating Site: Patient's home    Distant Site: Provider Remote Setting- Home Office     Session Start Time: 8:00  Session End Time: 8:22     Session Length: 22 minutes    Session #: 2     Attendees: Client attended alone    Telemedicine Visit: The patient's condition can be safely assessed and treated via synchronous audio and visual telemedicine encounter.       Reason for Telemedicine Visit: Services only offered telehealth     Originating Site (Patient Location): Patient's home     Distant Site (Provider Location): Provider Remote Setting- Home Office     Consent:  The patient/guardian has verbally consented to: the potential risks and benefits of telemedicine (video visit) versus in person care; bill my insurance or make self-payment for services provided; and responsibility for payment of non-covered services.      Patient would like the video invitation sent by:  My Chart     Mode of Communication:  Video Conference via [facility]     As the provider I attest to compliance with applicable laws and regulations related to telemedicine.     Intervention: reviewed strategies for coping with anxiety and depression symptoms; motivational interviewing: explored potential barriers for making healthy changes    Identifying Information:  Client is a 21 year old, , partnered / significant other female. Client was referred for a diagnostic assessment by PCP.  The purpose of this evaluation is to: provide treatment recommendations and clarify diagnosis.  Client is currently employed full time and reports she is able to function appropriately at work.. Client attended the session alone.       Client's Statement of  "Presenting Concern:  Client reported seeking services at this time for diagnostic assessment and recommendations for treatment.  Client's presenting concerns include: Her PCP recommended ADHD testing because she can't sit still in meetings. She works remotely so this is less disruptive. In school she had to switch to online high school because she couldn't sit still and listen to people talk. She has to move at her own pace. She gets easily distracted. She feels she needs to be doing something all the time or she starts pacing. She can't sit still. She can't watch a movie and relax (\"it is torture for me\"). She thinks this affects relationships because people want her to sit still and relax. She is always in a rush even if she doesn't need to be. She walks really fast and \"act like I'm in a rush.\" She gets frustrated and impatient when driving and always feels like she is rushing. She always needs to be doing something and she is looking for the next thing. It is hard for her to wait in line. She dislikes going to the grocery store or crowded places because it is overstimulating. If someone has a loud voice she can't focus on the fact that she is shopping. She was at a restaurant recently and others were talking so loud that she couldn't focus on her own conversation that she had to sit and leave. She feels she has anxiety in situations and her anxious thoughts feel more under control at this time. She picks at her fingernails and hang nails that it hurts. She picks at the skin on her lips too (\"its a think I need to be doing I have to fidget with something\"). She forgets her keys when she leaves the house. She will misplace things often. Her room can be disorganized and it is hard to keep track of her things. She struggles to listen in conversation. She interrupts others and talks a lot. She is always thinking about the next thing she is going to say so she wants to jump in and say her thoughts. She feels her brain " "is working really fast and if she has something to say she can only think about that at that point. She jumps from one thing to the next without finishing things. She is \"very scatterbrained.\" She will start dishes and then go into her room for something and get side tracked and she doesn't go back to complete the original task. She gets distracted by a message on her phone when she is at work and then she is off track and researching things online. Client stated that symptoms have resulted in the following functional impairments: management of the household and or completion of tasks, self-care, social interactions and work / vocational responsibilities.         History of Presenting Concern:  Client reported that she has not completed a previous ADHD diagnostic assessment.  Client has not received a previous diagnosis of ADHD.  Client reported that medication has not been prescribed medication to address these problems. Client reported that these problem(s) began in childhood. Client has not attempted to resolve these concerns in the past. Client reported that other professionals are involved in providing support / services. Client is prescribed Lexapro by PCP.       Social History:  As a child, client reported that she failed to complete assigned chores in the home environment, had problems getting ready for school in the morning, had problems with organization and keeping track of items, misplaced or lost things and displayed argumentative or oppositional behaviors. Client reported no difficulty with childhood peer relationships. As a child, client reported having regular and consistent sleep patterns. Client reported currently experiencing regular and consistent sleep patterns.  Client reported sleeping approximately 8-9 hours per night. She feels she needs a lot of sleep. It is hard for her to get up in the morning and get out of bed. Client reported that she has not completed a sleep study. Client reported " "having a well balanced diet.  There are not significant nutritional concerns. Client reported sporadic exercise patterns.    Client's highest education level was some college. Client graduated high school in 2018. She estimated she obtained mostly As Bs. She wasn't going to school much because of anxiety and she had difficulty sitting still in the classroom so she transitioned to doing online school during 10th grade. Her mother was also sick with cancer during this time (this was contributing to not wanting to go to school). She preferred this because she was able to go at her own pace. This school format was \"pretty easy\" for her. During the elementary, middle, and high school years, patient recalls academic strengths in the area of \"floral design\" and hands-on classes. She kind of liked science. Client reported experiencing academic problems in math and history. She couldn't focus in lecture classes. Client did identify the following learning problems: attention and concentration. Client did not receive tutoring services during the school years. Client did not receive special education services. Client reported significant behavior and discipline problems including: disruptive classroom behavior and frequent tardiness or absences and failure to finish or complete homework. When she was in school she was talkative and disruptive. It was hard for her to sit still. She was often doodling during class and she was on her phone during class. She couldn't pay attention during lecture and she would \"completely zone out.\" She didn't feel she had much homework and got her work done in the classroom; there wasn't much to bring home. Her grades suffered because she had a truancy problem (they wanted her to go to court but she appealed it). She then decided to switch to the online format. Client had reminders when she was younger to get her home work done. When her parents got  she was with her father but he wasn't " "home a lot (she had to self-initiate doing homework). Her mother would call her and check to see if she did her homework. Client did attend post-secondary school.  She just started in her first semester of college at [employer] (she is taking online courses right now). She can't listen to lectures and she is struggling with watching the videos. She prefers to look at the course work to teach herself. Client reported that she is very anxious about school so once she gets her homework she sits down to do it (\"It just consumes me\"). Even if something isn't due until the end of the week, she feels she needs to sit and do it right away. Writing an essay takes a long time (\"It will take me 3 hours to write one sentence because I'm a perfectionist. I'll look at what I wrote and think it was dumb, then I'll do something else in between and then I come back to it\"). She bounces around between classes and assignments and doesn't just focus on one task at a time. She is struggling to manage her time and all of the assignments.    Client reported that she is currently employed full-time. 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 Client reported that the current job is a good fit for her skills and personality.  Client reported that she been frequently late for work, often felt bored, disorganized behavior and distractible behavior . She has had difficulty showing up on time (her current job allows her to make her own hours, but she might have to call the patient and tell them that she is running late). She struggles with getting out of the door on time in the morning. Client feels that she takes short-cuts and might neglect other tasks and miss things (\"I do the bare minimum but people tell me I'm a good worker\"). Client feels she works hard but with her current job it is hard for her to stay focused. Lately, she is not performing as well as " "she feels she could. She is often looking for jobs but instead of doing that she is texting or looking at her phone. Others don't observe this behavior.  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  The longest period of employment has been 3 years.  Client has not been terminated from a place of employment.       Risk Taking Behaviors:  Client reported a history of the following risk taking behaviors: substance use and shoplifting a few times when she was younger (hasn't done this since she was 18 years old)      Motor Vehicle Operation:  Client has received a 's license.  Client has received moving violations, including: one speeding tickets.  Client reported the following driving habits: experiences road rage and often exceeds the speed limit / speeds.  She feels very impatient while driving. Even when she is not in a rush, she feels she is in a rush. She dislikes sitting at a long red light. Her boyfriend will comment that she is following people too closely and that she seems like she is in a rush. According to client, other people are not comfortable riding as a passenger when she is driving.  Her boyfriend has commented that she makes him feel \"stressed\" while she drives.      Mental Status Assessment:  Appearance:   Appropriate   Eye Contact:   Good   Psychomotor Behavior: Normal   Attitude:   Cooperative   Orientation:   All  Speech   Rate / Production: Normal    Volume:  Normal   Mood:    Normal  Affect:    Appropriate   Thought Content:  Clear   Thought Form:  Coherent  Logical   Insight:    Good       Review of Symptoms:  Depression:     Change in sleep, Lack of interest, Change in energy level, Difficulties concentrating, Psychomotor slowing or agitation and Feeling sad, down, or depressed  Leticia:             No Symptoms  Psychosis:       No Symptoms  Anxiety:           Excessive worry, Nervousness, Sleep disturbance, Psychomotor agitation, Ruminations, Poor concentration and " "Irritability  Panic:              No symptoms  Post Traumatic Stress Disorder:  Experienced traumatic event significant loss, some neglect in childhood   Eating Disorder:          No Symptoms  ADD / ADHD:              Inattentive, Difficulties listening, Poor task completion, Poor organizational skills, Distractibility, Forgetful, Interrupts, Restlessness/fidgety, Hyperverbal and Hyperactive  Conduct Disorder:       No symptoms  Autism Spectrum Disorder:     No symptoms  Obsessive Compulsive Disorder:       No Symptoms- she is \"all or nothing\" with cleaning - things get messy and then she has to do everything. She checks her alarms 5 times before going to bed so that she knows she will wake up on time; she checks locks before beds and she has to double check dates for meetings at work  Reckless Behavior: No symptoms        Safety Issues and Plan for Safety and Risk Management:  Client has had a history of suicidal ideation:  Client had passive SI (\"I wish i was dead but i never wanted to act on it - i knew i wouldn't actually carry it out and act on it\") when she was 15-18 when her mother was sick and going through a high school relationship and dealing with drama at school she had thoughts    Client denies current fears or concerns for personal safety.  Client denies current or recent suicidal ideation or behaviors.  Client denies current or recent homicidal ideation or behaviors.  Client denies current or recent self injurious behavior or ideation.  Client denies other safety concerns.  Client reports there are no firearms in the house.  Recommended that patient call 911 or go to the local ED should there be a change in any of these risk factors.        Diagnostic Criteria:    Generalized Anxiety Disorder  A. Excessive anxiety and worry about a number of events or activities (such as work or school performance).   B. The person finds it difficult to control the worry.  C. Select 3 or more symptoms (required for " diagnosis). Only one item is required in children.   - Restlessness or feeling keyed up or on edge.    - Being easily fatigued.    - Difficulty concentrating or mind going blank.    - Irritability.    - Muscle tension.    - Sleep disturbance (difficulty falling or staying asleep, or restless unsatisfying sleep).   D. The focus of the anxiety and worry is not confined to features of an Axis I disorder.  E. The anxiety, worry, or physical symptoms cause clinically significant distress or impairment in social, occupational, or other important areas of functioning.   F. The disturbance is not due to the direct physiological effects of a substance (e.g., a drug of abuse, a medication) or a general medical condition (e.g., hyperthyroidism) and does not occur exclusively during a Mood Disorder, a Psychotic Disorder, or a Pervasive Developmental Disorder.     Major Depressive Disorder  A) Recurrent episode(s) - symptoms have been present during the same 2-week period and represent a change from previous functioning 5 or more symptoms (required for diagnosis)   - Depressed mood. Note: In children and adolescents, can be irritable mood.     - Diminished interest or pleasure in all, or almost all, activities.    - Decreased sleep.    - Psychomotor activity agitation.    - Fatigue or loss of energy.    - Diminished ability to think or concentrate, or indecisiveness.   B) The symptoms cause clinically significant distress or impairment in social, occupational, or other important areas of functioning  C) The episode is not attributable to the physiological effects of a substance or to another medical condition  D) The occurence of major depressive episode is not better explained by other thought / psychotic disorders  E) There has never been a manic episode or hypomanic episode     Attention Deficit Hyperactivity Disorder  A) A persistent pattern of inattention and/or hyperactivity-impulsivity that interferes with functioning or  "development, as characterized by (1) Inattention and/or (2) Hyperactivity and Impulsivity  (1) Inattention: 6 or more of the following symptoms have persisted for at least 6 months to a degree that is inconsistent with developmental level and that negatively impacts directly on social and academic/occupational activities:  - Often fails to give close attention to details or makes careless mistakes in schoolwork, at work, or during other activities  - Often has difficulty sustaining attention in tasks or play activities  - Often does not seem to listen when spoken to directly  - Often does not follow through on instructions and fails to finish schoolwork, chores, or duties in the workplace  - Often loses things necessary for tasks or activities  - Is often easily distractedby extraneous stimuli  - Is often forgetful in daily activities  - Often fidgets with or taps hands or feet or squirms in seat  - Often leaves seat in situations when remaining seated is expected  - Often runs about or climbs in situationswhere it is inappropriate  - Often unable to play or engage in leisure activities quietly  - Is often \"on the go,\" acting as if \"driven by a motor\"  - Often talks excessively  - Often blurts out an answer before a question has been completed       Functional Status:  Client's symptoms are causing reduced functional status in the following areas: management of the household and or completion of tasks, self-care, social interactions and work / vocational responsibilities.       DSM-5Diagnoses: (Sustained by DSM5 Criteria Listed Above)    (F33.1) Major Depressive Disorder, Recurrent Episode, Moderate     300.02 (F41.1) Generalized Anxiety Disorder    RULE OUT: ADHD    Attendance Agreement:  Client has signed Attendance Agreement:No: unable to sign via telehealth      Preliminary Plan:  The client reports no currently identified Catholic, ethnic or cultural issues relevant to therapy.     services are not " indicated.    Modifications to assist communication are not indicated.    Collaboration / coordination of treatment will be initiated with the following support professionals: primary care physician.    Referral to another professional/service is not indicated at this time.    A Release of Information is not needed at this time.    Client was given self and collaborative rating scales to be completed prior to the next appointment.  Client consented to sending/receiving these measures via email.  Depression and anxiety rating scales were completed.  A third appointment was scheduled at this time. Feedback was scheduled for Tuesday 2/22 at 9:00am.    Report to child / adult protection services was NA.    Patient will have open access to their mental health medical record.    Natividad Kelley, PhD, LP  February 15, 2022

## 2022-02-16 ENCOUNTER — DOCUMENTATION ONLY (OUTPATIENT)
Dept: PSYCHOLOGY | Facility: CLINIC | Age: 22
End: 2022-02-16
Payer: COMMERCIAL

## 2022-02-16 DIAGNOSIS — Z11.59 ENCOUNTER FOR SCREENING FOR OTHER VIRAL DISEASES: Primary | ICD-10-CM

## 2022-02-16 NOTE — PROGRESS NOTES
[facility]  ADHD Evaluation     Patient: Girish Calderón   YOB: 2000  MRN: [id number]    Date(s) of assessment: Diagnostic Assessment (2/8/22; 2/15/22), Carlee self-report and collateral measures scored and interpreted (2/15/22), MMPI -2 (administered on 2/8/22, interpreted on 2/8/22)      Information about appointment:  Client attended two sessions to aid in determining client's mental health diagnosis or diagnoses and treatment recommendations that best address client concerns. Available medical records were reviewed. There were no previous psychological evaluations for review. A diagnostic assessment was conducted at the initial appointment. Client completed several rating scales to assist in assessing attention-related and other mental health symptoms that may be causing impairments in functioning. Rating scales were also completed by a collateral contact. Client also completed personality testing to aid in diagnostic clarification.     Assessment tools:   Carlee Adult ADHD Rating Scale-IV: Self and Other Reports (BAARS-IV), Carlee Functional Impairment Scale: Self and Other Reports (BFIS), Carlee Deficits in Executive Functioning Scale: Self and Other Reports (BDEFS), Patient Health Questionnaire-9 (PHQ-9), Generalized Anxiety Disorder-7 (HERACLIO-7) and [place] Multiphasic Personality Inventory (MMPI-2) *Testing administered remotely     Assessment Results:     Behavioral Observations:  Client arrived on time to each session. She was pleasant and cooperative at all times. She did not demonstrate difficulty with inattention or hyperactivity/impulsivity during sessions. The following results are likely to be an accurate reflection of Client's current functioning.     Carlee Adult ADHD Rating Scale-IV: Self and Other Reports (BAARS-IV)  The BAARS-IV assesses for symptoms of ADHD that are experienced in one's daily life. This assessment measure includes self and collateral rating  "scales designed to provide information regarding current and childhood symptoms of ADHD including inattention, hyperactivity, and impulsivity. Self-report scores are reported as percentiles. Scores at the 76th-83rd percentile are considered marginal, scores at the 84th-92nd percentile are considered borderline, scores at the 93rd-95th percentile are considered mild, scores at the 96th-98th percentile are considered moderate, and those at the 99th percentile are considered severe. Collateral or \"other\" rating scales are reported as number of symptoms observed in comparison to those reported by the client. Norms and percentile scores are not available for collateral reports.      Current Symptoms Scale--Self Report:   Client completed the self-report inventory of current symptoms. The results indicate that the client's Total ADHD Score was 63 which places her in the 99th percentile for overall ADHD symptoms. In addition, the client endorsed the following occur \"often\" or \"very often\": 5/9 (96th percentile) Inattention symptoms, 9/9 (99th percentile) Hyperactivity/Impulsivity symptoms, and 4/9 (93rd percentile) Sluggish Cognitive Tempo symptoms. Client indicated that the reported symptoms have resulted in impaired functioning in school, home, work, and social relationships. Overall, the results suggest the client is reporting moderate symptoms of inattention and severe symptoms of hyperactivity/impulsivity at this time.      Current Symptoms Scale--Other Report:  Client's sister completed the collateral report inventory of current symptoms. Based on the collateral contact's observation of symptoms, the client demonstrates the following \"often\" or \"very often\": 4/9 Inattention symptoms, 5/5 Hyperactivity symptoms, 4/4 Impulsivity symptoms, and 2/9 Sluggish Cognitive Tempo symptoms. The client's Total ADHD Score was 58. The collateral contact indicated the client demonstrates impaired functioning in school, work, home " "and social relationships. The collateral- and self-report are similar and suggest she experiences symptoms of inattention and hyperactivity/impulsivity at this time.      Childhood Symptoms Scale--Self-Report:  Client completed the self-report inventory of childhood symptoms. The results indicate that the client's Total ADHD Score was 66 which places her in the 99th percentile for overall ADHD symptoms in childhood. In addition, the client endorsed having experienced the following \"often\" or \"very often\": 6/9 (93rd percentile) Inattention symptoms and 9/9 (99th percentile) Hyperactivity-Impulsivity symptoms. Client indicated that the reported symptoms resulted in impaired functioning in school, social relationships and home. Overall, the results suggest the Client reported experiencing mild symptoms of inattention and severe symptoms of inattention as a child.     Childhood Symptoms Scale--Other Report:  Client's father completed the collateral report inventory of childhood symptoms. Based on the collateral contact's recollection of client's childhood symptoms, the client demonstrated the following \"often\" or \"very often\": 8/9 Inattention symptoms and 9/9 Hyperactivity-Impulsivity symptoms. The client's Total ADHD Score was 67. The collateral-report and self-report scores are similar and suggest Client experienced symptoms of inattention and hyperactivity/impulsivity in childhood.       Carlee Functional Impairment Scale: Self and Other Reports (BFIS)  The BFIS is used to assess an individuals' psychosocial impairment in major life/daily activities that may be due to a mental health disorder. This assessment measure includes self and collateral rating scales. Self-report scores are reported as percentiles. Scores at the 76th-83rd percentile are considered marginal, scores at the 84th-92nd percentile are considered borderline, scores at the 93rd-95th percentile are considered mild, scores at the 96th-98th percentile " "are considered moderate, and those at the 99th percentile are considered severe. Collateral or \"other\" rating scales are reported as number of symptoms observed in comparison to those reported by the client. Norms and percentile scores are not available for collateral reports.      Results indicate the client identified impairment (scores at or greater than 93rd percentile) in the following areas: home-chores, work, social-strangers, social-friends, education, money management, and driving. The client's Mean Impairment Score was 5.54 (93rd percentile) indicating the client is reporting mild impairment in functioning across domains. Client s sister completed the collateral report scale for this measure. Her scores were somewhat discrepant. Her scores were generally lower (Mean Impairment Score of 4.31). She noted impairment in the areas of: home-chores, social-strangers, and social-friends.        Carlee Deficits in Executive Functioning Scale (BDEFS)  The BDEFS is a measure used for evaluating dimensions of adult executive functioning in daily life. This assessment measure includes self and collateral rating scales. Self-report scores are reported as percentiles. Scores at the 76th-83rd percentile are considered marginal, scores at the 84th-92nd percentile are considered borderline, scores at the 93rd-95th percentile are considered mild, scores at the 96th-98th percentile are considered moderate, and those at the 99th percentile are considered severe. Collateral or \"other\" rating scales are reported as number of symptoms observed in comparison to those reported by the client. Norms and percentile scores are not available for collateral reports.      Results indicate the client's Total Executive Functioning Score was 242 (99th percentile). The ADHD-Executive Functioning Index score was 33 (99th percentile). The These scores suggest the client has severe deficits in executive functioning. She endorsed the following: " self-management to time (mild); self-organization/problem-solving (severe); self-restraint (moderate); self-motivation (mild); and self-regulation of emotions (mild). Client's sister completed the collateral rating scale, which indicated somewhat discrepant results. Her scores were generally lower. She noted impairment in self-organization/problem-solving, self-restraint and self-motivation.    Summary of [place] Multiphasic Personality Inventory--Second Edition   Client completed the [place] Multiphasic Personality Inventory-2 (MMPI-2), a self-report personality inventory, as part of her evaluation. Validity scales indicate that the client responded in an open and consistent manner, resulting in a valid profile. While overreporting is possible, it is also likely that the Client has limited resources for coping with the stresses and demands of daily life. The following results should be interpreted with caution and in light of other information. Her profile reflects a moderate level of general emotional distress. Individuals with similar profiles have a highly general disposition toward negative emotionality or a vulnerability to anxiety, apprehension, dread, discomfort, distress, uncertainty, and tension. They are likely nervous and experience sleep disturbance and have issues with attention and concentration. They may feel stressed out and vulnerable to upset by disappointment and decisions that don t work out. Individuals with similar profiles have a low threshold for boredom, are intolerant of frustration, are excitable and overreactive, and seemingly unable to modulate impulses. They may be non-conforming, sensation seeking, and have a tendency to sacrifice long-term goals for short-term satisfactions. They admit to past delinquency and problems with authority. Individuals with similar profiles also experience problems with memory, and concentration. They likely experience a loss of interest that defeats  the completion - even the initiation - of mental and behavioral projects. Individuals with similar profiles are likely troubled by a lack of cognitive control, with ruminations and preoccupations they know to be undirected, fruitless and largely irrational. They report impediments to performance in the workplace including fatigue, apathy, feeling overwhelmed, indecisive and distractibility. They likely present as disorganized with poor concentration and judgment. Individuals with similar profiles experience overly busy but massively inefficient cognitive activity. They likely experience obsessiveness and are preoccupied with details. They are likely overwhelmed by the endless supply of considerations brought to bear in decision making.     Generalized Anxiety Disorder Questionnaire (HERACLIO-7)  This questionnaire is designed to screen for anxiety in adults. Based on the client's score of 19, she is reporting severe symptoms of anxiety at this time. Client identified the following symptoms of anxiety: feeling nervous, anxious or on edge; not being able to stop or control worrying, worrying too much about different things, trouble relaxing, being so restless it s hard to sit still, becoming easily annoyed or irritable, and feeling afraid as if something awful might happen.    Patient Health Questionnaire- 9 (PHQ-9)   This questionnaire is designed to screen for depression in adults. Based on the client's score of 14, she is reporting moderate symptoms of depression at this time. Symptoms endorsed include: little interest or pleasure in doing things; trouble falling asleep, staying asleep, or sleeping too much; feeling tired or having little energy; poor appetite or overeating, poor concentration and feeling fidgety/restless     Summary (based on clinical interview, review of records, test results):  Client is a 21-year-old, , partnered / significant other female. Client was referred for a diagnostic assessment by  "PCP.  The purpose of this evaluation is to: provide treatment recommendations and clarify diagnosis. Client's presenting concerns include: Her PCP recommended ADHD testing because she can't sit still in meetings. She works remotely so this is less disruptive. In school she had to switch to online high school because she couldn't sit still and listen to people talk. She has to move at her own pace. She gets easily distracted. She feels she needs to be doing something all the time or she starts pacing. She can't sit still. She can't watch a movie and relax (\"it is torture for me\"). She thinks this affects relationships because people want her to sit still and relax. She is always in a rush even if she doesn't need to be. She walks really fast and \"act like I'm in a rush.\" She gets frustrated and impatient when driving and always feels like she is rushing. She always needs to be doing something and she is looking for the next thing. It is hard for her to wait in line. She dislikes going to the grocery store or crowded places because it is overstimulating. If someone has a loud voice she can't focus on the fact that she is shopping. She was at a restaurant recently and others were talking so loud that she couldn't focus on her own conversation that she had to sit and leave. She feels she has anxiety in situations and her anxious thoughts feel more under control at this time. She picks at her fingernails and hang nails that it hurts. She picks at the skin on her lips too (\"its a think I need to be doing I have to fidget with something\"). She forgets her keys when she leaves the house. She will misplace things often. Her room can be disorganized and it is hard to keep track of her things. She struggles to listen in conversation. She interrupts others and talks a lot. She is always thinking about the next thing she is going to say so she wants to jump in and say her thoughts. She feels her brain is working really fast and " "if she has something to say she can only think about that at that point. She jumps from one thing to the next without finishing things. She is \"very scatterbrained.\" She will start dishes and then go into her room for something and get side tracked and she doesn't go back to complete the original task. She gets distracted by a message on her phone when she is at work and then she is off track and researching things online. Client stated that symptoms have resulted in the following functional impairments: management of the household and or completion of tasks, self-care, social interactions and work / vocational responsibilities. Client reported that she has not completed a previous ADHD diagnostic assessment.  Client has not received a previous diagnosis of ADHD.  Client reported that medication has not been prescribed medication to address these problems. Client reported that these problem(s) began in childhood. Client has not attempted to resolve these concerns in the past. Client reported the following previous diagnoses which includes: an Anxiety Disorder and Depression.  Client reported symptoms began at age 14. She explained that she was struggling in school and had family stress (her parents  when she was 8 years old and she had to go back and forth from their homes and she was starting high school). Her father had a gambling addiction around that time and was seeking treatment. There were also family deaths at this time. Her mother  from cancer when Client was 17 years old. Client has received mental health services in the past: medications from PCP and therapy. She was in therapy briefly years ago \"it wasn't really for me at the time.\" She feels too busy to try to do therapy at this time. She struggles to get scheduled for doctor appointments and doesn't think she has time to do therapy.  She had PRN Lorazepam for panic attacks but only used one and then didn't need them. Psychiatric " "Hospitalizations: None.  Client denies a history of civil commitment.  Client is receiving other mental health services.  These include medication from PCP. Client is prescribed Lexapro by PCP (for almost a year). Client feels she worries about \"everything.\" She thinks medications help this. She felt her thoughts were \"crippling\" on a daily basis. She worries about something bad happening (e.g., if her boyfriend doesn't come home from work she has a panic attack because she worries he is dead). She \"catastrophizes\" when things happen (if she doesn't do something right she should quit her job, or a task feels too daunting). She thinks the Lexapro helps to \"shut off the anxious thoughts.\" She hasn't had panic attacks since starting Lexapro. Client did not report a personal history of chemical dependence.    Client reported she grew up in [place], [place].  She was raised by her biological parents. Her parents  when she was 8 years old. She was the second born of two children. Client reported that their childhood was difficult.  Her mother moved out and took her sister so Client stayed with her father. He struggled after the divorce. She explained that her father struggled with a gambling addiction when Client was a teenager (and he might have done some drugs). She was left at home all the time and he would work and go to the [employer]. He got treatment for this (he was dealing with the death of his mother and his brother). She was pretty independent while growing up and had to fend for herself. She went to her mother's to visit. Client described their current relationships with family of origin as close with her father and sister (her mother  in 2017 when Client was 17 years old). She had been diagnosed with cancer and  within one year. This was hard. She feels she is doing better with that now. Client reported the following relationship history: one significant relationship (started dating at age 15).  " "Client's current relationship status is has a partner or significant other for 6 years.  They get along well.  Client identified their sexual orientation as heterosexual.  Client reported having 0 children. Client identified partner; siblings; pets as part of their support system.  Client identified the quality of these relationships as other, I only have a relationship with my sister and dad. The rest don t talk to us much after my mom passing away in 2017.     As a child, client reported that she failed to complete assigned chores in the home environment, had problems getting ready for school in the morning, had problems with organization and keeping track of items, misplaced or lost things and displayed argumentative or oppositional behaviors. Client reported no difficulty with childhood peer relationships. As a child, client reported having regular and consistent sleep patterns. Client reported currently experiencing regular and consistent sleep patterns.  Client reported sleeping approximately 8-9 hours per night. She feels she needs a lot of sleep. It is hard for her to get up in the morning and get out of bed. Client reported that she has not completed a sleep study.     Client's highest education level was some college. Client graduated high school in 2018. She estimated she obtained mostly As and Bs. She wasn't going to school much because of anxiety and she had difficulty sitting still in the classroom so she transitioned to doing online school during 10th grade. Her mother was also sick with cancer during this time (this was contributing to not wanting to go to school). She preferred this because she was able to go at her own pace. This school format was \"pretty easy\" for her. During the elementary, middle, and high school years, Client recalls academic strengths in the area of \"floral design\" and hands-on classes. She kind of liked science. Client reported experiencing academic problems in math and " "history. She couldn't focus in lecture classes. Client did identify the following learning problems: attention and concentration. Client did not receive tutoring services during the school years. Client did not receive special education services. Client reported significant behavior and discipline problems including: disruptive classroom behavior and frequent tardiness or absences and failure to finish or complete homework. When she was in school she was talkative and disruptive. It was hard for her to sit still. She was often doodling during class and she was on her phone during class. She couldn't pay attention during lecture and she would \"completely zone out.\" She didn't feel she had much homework and got her work done in the classroom; there wasn't much to bring home. Her grades suffered because she had a truancy problem (they wanted her to go to court but she appealed it). She then decided to switch to the online format. Client had reminders when she was younger to get her homework done. When her parents got  she was with her father but he wasn't home a lot (she had to self-initiate doing homework). Her mother would call her and check to see if she did her homework. Client did attend post-secondary school.  She just started in her first semester of college at [email or web address] (she is taking online courses right now). She can't listen to lectures and she is struggling with watching the videos. She prefers to look at the course work to teach herself. Client reported that she is very anxious about school so once she gets her homework she sits down to do it (\"It just consumes me\"). Even if something isn't due until the end of the week, she feels she needs to sit and do it right away. Writing an essay takes a long time (\"It will take me 3 hours to write one sentence because I'm a perfectionist. I'll look at what I wrote and think it was dumb, then I'll do something else in between and then I come back to " "it\"). She bounces around between classes and assignments and doesn't just focus on one task at a time. She is struggling to manage her time and all of the assignments.     Client reported that she is currently employed full-time. 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 Client reported that the current job is a good fit for her skills and personality. Client reported that she been frequently late for work, often felt bored, disorganized behavior and distractible behavior. She has had difficulty showing up on time (her current job allows her to make her own hours, but she might have to call the Client and tell them that she is running late). She struggles with getting out of the door on time in the morning. Client feels that she takes short-cuts and might neglect other tasks and miss things (\"I do the bare minimum but people tell me I'm a good worker\"). Client feels she works hard but with her current job it is hard for her to stay focused. Lately, she is not performing as well as she feels she could. She is often looking for jobs but instead of doing that she is texting or looking at her phone. Others don't observe this behavior. 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 The longest period of employment has been 3 years. Client has not been terminated from a place of employment.     Results of testing were indicative of ADHD. Rating scales suggest the Client is reporting symptoms of inattention and hyperactivity/impulsivity that have been present since childhood. Collateral reports (sister and father) also suggested the presence of such symptoms currently and in childhood. Rating scales also suggested the client is experiencing significant deficits in executive functioning that are likely due to ADHD. Impairment is reported in more than one setting (e.g., school, home, work). Client s responses on self-report " measures suggest she is experiencing severe anxiety and moderate depression at this time. Personality testing was also significant for anxiety and problems with attention and concentration. Based on the results of clinical interview and psychological testing, the client currently meets criteria for diagnoses of Attention-Deficit/Hyperactivity Disorder, Combined Presentation; Major Depressive Disorder, Recurrent, Moderate; and Generalized Anxiety Disorder. Client will be provided with the results of testing, diagnosis, and recommendations in her last appointment.    DSM5 Diagnoses: (Sustained by DSM5 Criteria Listed Above)    Attention-Deficit/Hyperactivity Disorder, Combined Presentation (F90.2)    Generalized Anxiety Disorder (F41.1)    Major Depressive Disorder, Recurrent Episode, Moderate (F33.1)    Psychosocial & Contextual Factors: school stress; history of significant loss     Recommendations:    1. Schedule a medication evaluation with your physician or psychiatrist. Medications are often beneficial in treating depression and anxiety symptoms, as well as ADHD. It will be important that each condition is treated, as treatment for one without the other may lead to an increase in symptoms (e.g., treating ADHD, but not anxiety, can lead to increased anxiety).    2. Access resources through websites, books, and articles such as those provided in the Adult ADHD Symptom Management handout.      3. Consider working with an ADHD  or individual therapist to learn skills to assist with symptom management, as well as ways to improve relationships, etc. that may have been impacted by your symptoms.    4. Individual therapy is recommended. Therapies focused on identifying and challenging problematic thought and behavior patterns while increasing the use of healthy coping skills has been found to be effective in treating depression. It will be important to set goals in this therapy and work actively toward achieving  short-term successes that lead to the completion of each goal. Action-oriented therapies, such as CBT and ACT are particularly recommended for the treatment of chronic depression.    5. The use of behavioral strategies such as diaphragmatic breathing, guided imagery, and mindfulness is often helpful in the management of anxiety symptoms.       Natividad Kelley, PhD, LP  Licensed Psychologist

## 2022-02-18 DIAGNOSIS — F41.9 ANXIETY: ICD-10-CM

## 2022-02-18 RX ORDER — ESCITALOPRAM OXALATE 10 MG/1
TABLET ORAL
Qty: 90 TABLET | Refills: 3 | Status: SHIPPED | OUTPATIENT
Start: 2022-02-18 | End: 2022-07-19

## 2022-02-18 NOTE — TELEPHONE ENCOUNTER
Routing refill request to provider for review/approval because:  PHQ9: 12 on 2/2022    Norberto Espinal RN

## 2022-02-22 ENCOUNTER — VIRTUAL VISIT (OUTPATIENT)
Dept: PSYCHOLOGY | Facility: CLINIC | Age: 22
End: 2022-02-22
Payer: COMMERCIAL

## 2022-02-22 DIAGNOSIS — F41.1 GENERALIZED ANXIETY DISORDER: Primary | ICD-10-CM

## 2022-02-22 DIAGNOSIS — F90.2 ATTENTION DEFICIT HYPERACTIVITY DISORDER, COMBINED TYPE: ICD-10-CM

## 2022-02-22 DIAGNOSIS — F33.1 MAJOR DEPRESSIVE DISORDER, RECURRENT EPISODE, MODERATE (H): ICD-10-CM

## 2022-02-22 PROCEDURE — 96130 PSYCL TST EVAL PHYS/QHP 1ST: CPT | Mod: 95 | Performed by: PSYCHOLOGIST

## 2022-02-22 PROCEDURE — 96131 PSYCL TST EVAL PHYS/QHP EA: CPT | Mod: 95 | Performed by: PSYCHOLOGIST

## 2022-02-22 NOTE — PROGRESS NOTES
Client Name: Girish Calderón Date: 2/22/22    Service Type: Individual (ADHD Evaluation feedback session)     Session Start Time: 9:00 Session End Time: 9:20      Session Length: 20 minutes      Session #: (feedback)     Attendees: Client attended alone    Telemedicine Visit: The patient's condition can be safely assessed and treated via synchronous audio and visual telemedicine encounter.      Reason for Telemedicine Visit: Services only offered telehealth    Originating Site (Patient Location): Patient's home    Distant Site (Provider Location): Provider Remote Setting- Home Office    Consent:  The patient/guardian has verbally consented to: the potential risks and benefits of telemedicine (video visit) versus in person care; bill my insurance or make self-payment for services provided; and responsibility for payment of non-covered services.     Mode of Communication:  Video Conference via [employer]    As the provider I attest to compliance with applicable laws and regulations related to telemedicine.         DATA        Treatment Objective(s) Addressed in This Session:   Provided feedback on ADHD evaluation. Reviewed test results in depth and answered client's questions. Client diagnosed with Attention-Deficit/Hyperactivity Disorder, Combined Presentation; Generalized Anxiety Disorder and Major Depressive Disorder, Recurrent Episode, Moderate. Reviewed ADHD symptom management handout. This provider also completed full written report of evaluation, including integration of testing data, summary, and recommendations. Please see Documentation Only dated 2/16/22.     Progress on / Status of Treatment Objective(s) / Homework:   Completed      Intervention:  ADHD Evaluation feedback; Reviewed report (can be found in Documentation Only encounter dated 2/16/22); Client was appreciative of the feedback and expressed understanding of the diagnoses.  She has an appointment scheduled with her PCP tomorrow to discuss  medications.        ASSESSMENT: Current Emotional / Mental Status (status of significant symptoms):  Risk status (Self / Other harm or suicidal ideation)  Client denies current fears or concerns for personal safety.  Client denies current or recent suicidal ideation or behaviors.  Client denies current or recent homicidal ideation or behaviors.  Client denies current or recent self-injurious behavior or ideation.  Client denies other safety concerns.  A safety and risk management plan has not been developed at this time, however client was given the after-hours number / 911 should there be a change in any of these risk factors.     Appearance: Appropriate   Eye Contact: Good   Psychomotor Behavior: Normal   Attitude: Cooperative   Orientation: All  Speech  Rate / Production: Normal   Volume: Normal   Mood: Normal  Affect: Appropriate   Thought Content: Clear   Thought Form: Coherent Logical   Insight: Good      Medication Review:  Client is prescribed Lexapro by PCP     Medication Compliance:  Yes     Changes in Health Issues:  None reported     Chemical Use Review:  Substance Use: Chemical use reviewed, no active concerns identified      Tobacco Use: No current tobacco use.      Collateral Reports Completed:  Routed note to Care Team Member(s)     PLAN: (Homework, other)     Recommendations:       1. Schedule a medication evaluation with your physician or psychiatrist. Medications are often beneficial in treating depression and anxiety symptoms, as well as ADHD. It will be important that each condition is treated, as treatment for one without the other may lead to an increase in symptoms (e.g., treating ADHD, but not anxiety, can lead to increased anxiety).     2. Access resources through websites, books, and articles such as those provided in the Adult ADHD Symptom Management handout.       3. Consider working with an ADHD  or individual therapist to learn skills to assist with symptom management, as well as  ways to improve relationships, etc. that may have been impacted by your symptoms.     4. Individual therapy is recommended.  Therapies focused on identifying and challenging problematic thought and behavior patterns while increasing the use of healthy coping skills has been found to be effective in treating depression. It will be important to set goals in this therapy and work actively toward achieving short-term successes that lead to the completion of each goal. Action-oriented therapies, such as CBT and ACT are particularly recommended for the treatment of chronic depression.     5. The use of behavioral strategies such as diaphragmatic breathing, guided imagery, and mindfulness is often helpful in the management of anxiety symptoms.        Natividad Kelley, PhD, LP  Licensed Psychologist      Psychological Testing   Billing/Services Summary       Testing Evaluation Services Base: 62249  (1st 60 mins) Add-on: 58154  (each addtl 60 mins)   Record Review and Clarify Referral Question   (9:40/10:00), (2/8/22) 20 minutes   Integration/Report Generation   (11:00/12:00), (2/8/22) - MMPI-2  (12:00/1:00), (2/15/22) - Carlee Scales  (3:00/4:00), (2/16/22) - Report Writing 60 minutes  60 minutes  60 minutes     Interactive Feedback Session  (9:00/9:20), (2/22/22)   20 minutes   Total Time: 220 minutes (3 hours, 40 minutes)    Total Units: 1 3           Diagnosis(es): (ICD-10)  Attention-Deficit/Hyperactivity Disorder, Combined Presentation (F90.2)  Generalized Anxiety Disorder (F41.1)  Major Depressive Disorder, Recurrent, Moderate (F33.1)

## 2022-02-23 ENCOUNTER — OFFICE VISIT (OUTPATIENT)
Dept: FAMILY MEDICINE | Facility: CLINIC | Age: 22
End: 2022-02-23
Payer: COMMERCIAL

## 2022-02-23 VITALS
OXYGEN SATURATION: 100 % | BODY MASS INDEX: 24.66 KG/M2 | WEIGHT: 134 LBS | HEIGHT: 62 IN | TEMPERATURE: 98.9 F | HEART RATE: 110 BPM | RESPIRATION RATE: 16 BRPM | DIASTOLIC BLOOD PRESSURE: 80 MMHG | SYSTOLIC BLOOD PRESSURE: 138 MMHG

## 2022-02-23 DIAGNOSIS — N76.0 VAGINITIS AND VULVOVAGINITIS: Primary | ICD-10-CM

## 2022-02-23 DIAGNOSIS — F90.2 ATTENTION DEFICIT HYPERACTIVITY DISORDER (ADHD), COMBINED TYPE: ICD-10-CM

## 2022-02-23 LAB
CLUE CELLS: NORMAL
TRICHOMONAS, WET PREP: NORMAL
WBC'S/HIGH POWER FIELD, WET PREP: NORMAL
YEAST, WET PREP: NORMAL

## 2022-02-23 PROCEDURE — 87210 SMEAR WET MOUNT SALINE/INK: CPT | Performed by: FAMILY MEDICINE

## 2022-02-23 PROCEDURE — 99214 OFFICE O/P EST MOD 30 MIN: CPT | Performed by: FAMILY MEDICINE

## 2022-02-23 RX ORDER — DEXTROAMPHETAMINE SACCHARATE, AMPHETAMINE ASPARTATE MONOHYDRATE, DEXTROAMPHETAMINE SULFATE AND AMPHETAMINE SULFATE 2.5; 2.5; 2.5; 2.5 MG/1; MG/1; MG/1; MG/1
10 CAPSULE, EXTENDED RELEASE ORAL DAILY
Qty: 30 CAPSULE | Refills: 0 | Status: SHIPPED | OUTPATIENT
Start: 2022-02-23 | End: 2022-03-25

## 2022-02-23 RX ORDER — CLOBETASOL PROPIONATE 0.5 MG/G
OINTMENT TOPICAL 2 TIMES DAILY
Qty: 30 G | Refills: 1 | Status: SHIPPED | OUTPATIENT
Start: 2022-02-23 | End: 2022-07-19

## 2022-02-24 NOTE — PROGRESS NOTES
Answers for HPI/ROS submitted by the patient on 2/23/2022  How many servings of fruits and vegetables do you eat daily?: 2-3  On average, how many sweetened beverages do you drink each day (Examples: soda, juice, sweet tea, etc.  Do NOT count diet or artificially sweetened beverages)?: 1  How many minutes a day do you exercise enough to make your heart beat faster?: 30 to 60  How many days a week do you exercise enough to make your heart beat faster?: 5  How many days per week do you miss taking your medication?: 0  What is the reason for your visit today?: Medication for new ADHD diagnosis and a check for yeast infection  When did your symptoms begin?: 1-2 weeks ago  What are your symptoms?: Itching in genital area, discomfort  How would you describe these symptoms?: Severe  Are your symptoms:: Staying the same  Have you had these symptoms before?: Yes  Have you tried or received treatment for these symptoms before?: Yes  Did that treatment work? : Yes  Please describe the treatment you had:: I had a treatment for pinworms, the problem seemed to go away but now it back  Is there anything that makes you feel worse?: Na  Is there anything that makes you feel better?: No, aquaphor eases the pain a bit    Assessment/Plan:    Girish Calderón is a 21 year old female presenting for:    Vaginitis and vulvovaginitis  The patient does not have appear to have an infection in this area anymore. Unclear if she had a yeast infection or possibly even pinworms which she has been previously treated for. The area itself looks very irritated and I think that a high-dose corticosteroid twice daily for 7 to 10 days should help with the itching and break the cycle of itching and irritation.    If she is not feeling better in 7 to 10 days she will contact me at which point I think a referral to the gynecologist would be appropriate for further evaluation and management.  - Wet prep - Clinic Collect  - clobetasol (TEMOVATE) 0.05 %  "external ointment  Dispense: 30 g; Refill: 1    Attention deficit hyperactivity disorder (ADHD), combined type  I reviewed her testing and she has combination type ADHD. Adderall sent to the pharmacy. Discussed risks and benefits of the medication. We'll start a low-dose extended release and increase from there if needed.  - amphetamine-dextroamphetamine (ADDERALL XR) 10 MG 24 hr capsule  Dispense: 30 capsule; Refill: 0      There are no discontinued medications.        Chief Complaint:  Behavioral Problem and Vaginal Problem        Subjective:   Girish Calderón is a pleasant 21-year-old female presenting to the clinic today for several concerns:    1. Vaginitis: Patient unfortunately has been experiencing some vaginitis for the last 6 months. She was originally seen and wet prep was negative. She was given fluconazole which did not seem to help. She was then seen again and wet prep was positive for clue cells and yeast infection. This was treated but she states that she never felt completely better. She is then treated for pinworms. That seemed to help the itching for a day or 2 and then the itching returned. She states that it is almost intolerable itching. Aquaphor helps. She is using \"natural\" wipes on the area because toilet paper is painful. She does not really notice any discharge. No one in her household has had anything similar.    Itching is very severe particularly at night in the outer vaginal area as well as in the perirectal area.      2. ADHD: Patient had a formal evaluation for ADHD done. This was found that she has combination type ADHD. She is working with a therapist which she thinks is helpful for her anxiety and depression but she feels as though being on a medication might be beneficial as well.    12 point review of systems completed and negative except for what has been described above    History   Smoking Status     Passive Smoke Exposure - Never Smoker   Smokeless Tobacco     Never Used " "    Comment: father smokes outside         Current Outpatient Medications:      amphetamine-dextroamphetamine (ADDERALL XR) 10 MG 24 hr capsule, Take 1 capsule (10 mg) by mouth daily, Disp: 30 capsule, Rfl: 0     clobetasol (TEMOVATE) 0.05 % external ointment, Apply topically 2 times daily To vaginal area, Disp: 30 g, Rfl: 1     escitalopram (LEXAPRO) 10 MG tablet, Take 1 tablet by mouth once daily, Disp: 90 tablet, Rfl: 3     ketoconazole (NIZORAL) 2 % external shampoo, Apply topically daily as needed for itching or irritation, Disp: 120 mL, Rfl: 1     levonorgestrel-ethinyl estradiol (AVIANE) 0.1-20 MG-MCG tablet, Take 1 tablet by mouth daily, Disp: 84 tablet, Rfl: 4     omeprazole (PRILOSEC) 20 MG DR capsule, Take 1 capsule by mouth once daily, Disp: 30 capsule, Rfl: 2     triamcinolone (ARISTOCORT HP) 0.5 % external cream, Apply topically 2 times daily To eczema on arm, Disp: 15 g, Rfl: 3     triamcinolone (KENALOG) 0.1 % external cream, Apply topically 2 times daily, Disp: 80 g, Rfl: 0      Objective:  Vitals:    02/23/22 1454   BP: 138/80   Pulse: 110   Resp: 16   Temp: 98.9  F (37.2  C)   TempSrc: Tympanic   SpO2: 100%   Weight: 60.8 kg (134 lb)   Height: 1.575 m (5' 2\")       Body mass index is 24.51 kg/m .    Vital signs reviewed and stable  General: No acute distress  Psych: Appropriate affect  HEENT: moist mucous membranes, pupils equal, round, reactive to light and accomodation, tympanic membranes are pearly grey bilaterally  Lymph: no cervical or supraclavicular lymphadenopathy  Cardiovascular: regular rate and rhythm with no murmur  Pulmonary: clear to auscultation bilaterally with no wheeze  Abdomen: soft, non tender, non distended with normo-active bowel sounds  Extremities: warm and well perfused with no edema  Skin: warm and dry with no rash         This note has been dictated and transcribed using voice recognition software.   Any errors in transcription are unintentional and inherent to the " software.

## 2022-02-28 ENCOUNTER — MYC MEDICAL ADVICE (OUTPATIENT)
Dept: FAMILY MEDICINE | Facility: CLINIC | Age: 22
End: 2022-02-28
Payer: COMMERCIAL

## 2022-02-28 DIAGNOSIS — F90.2 ATTENTION DEFICIT HYPERACTIVITY DISORDER (ADHD), COMBINED TYPE: Primary | ICD-10-CM

## 2022-02-28 RX ORDER — DEXTROAMPHETAMINE SACCHARATE, AMPHETAMINE ASPARTATE MONOHYDRATE, DEXTROAMPHETAMINE SULFATE AND AMPHETAMINE SULFATE 5; 5; 5; 5 MG/1; MG/1; MG/1; MG/1
20 CAPSULE, EXTENDED RELEASE ORAL DAILY
Qty: 30 CAPSULE | Refills: 0 | Status: SHIPPED | OUTPATIENT
Start: 2022-02-28 | End: 2022-03-30

## 2022-03-14 ENCOUNTER — LAB (OUTPATIENT)
Dept: LAB | Facility: CLINIC | Age: 22
End: 2022-03-14
Payer: COMMERCIAL

## 2022-03-14 DIAGNOSIS — Z11.59 ENCOUNTER FOR SCREENING FOR OTHER VIRAL DISEASES: ICD-10-CM

## 2022-03-14 PROCEDURE — U0005 INFEC AGEN DETEC AMPLI PROBE: HCPCS

## 2022-03-14 PROCEDURE — U0003 INFECTIOUS AGENT DETECTION BY NUCLEIC ACID (DNA OR RNA); SEVERE ACUTE RESPIRATORY SYNDROME CORONAVIRUS 2 (SARS-COV-2) (CORONAVIRUS DISEASE [COVID-19]), AMPLIFIED PROBE TECHNIQUE, MAKING USE OF HIGH THROUGHPUT TECHNOLOGIES AS DESCRIBED BY CMS-2020-01-R: HCPCS

## 2022-03-15 LAB — SARS-COV-2 RNA RESP QL NAA+PROBE: NEGATIVE

## 2022-03-18 ENCOUNTER — HOSPITAL ENCOUNTER (OUTPATIENT)
Facility: AMBULATORY SURGERY CENTER | Age: 22
Discharge: HOME | End: 2022-03-18
Attending: INTERNAL MEDICINE | Admitting: INTERNAL MEDICINE
Payer: COMMERCIAL

## 2022-03-18 VITALS
HEART RATE: 87 BPM | RESPIRATION RATE: 16 BRPM | SYSTOLIC BLOOD PRESSURE: 117 MMHG | OXYGEN SATURATION: 98 % | TEMPERATURE: 97.9 F | DIASTOLIC BLOOD PRESSURE: 57 MMHG

## 2022-03-18 LAB — UPPER GI ENDOSCOPY: NORMAL

## 2022-03-18 PROCEDURE — 43235 EGD DIAGNOSTIC BRUSH WASH: CPT

## 2022-03-18 PROCEDURE — G8918 PT W/O PREOP ORDER IV AB PRO: HCPCS

## 2022-03-18 PROCEDURE — G8907 PT DOC NO EVENTS ON DISCHARG: HCPCS

## 2022-03-18 RX ORDER — NALOXONE HYDROCHLORIDE 0.4 MG/ML
0.4 INJECTION, SOLUTION INTRAMUSCULAR; INTRAVENOUS; SUBCUTANEOUS
Status: DISCONTINUED | OUTPATIENT
Start: 2022-03-18 | End: 2022-03-19 | Stop reason: HOSPADM

## 2022-03-18 RX ORDER — ONDANSETRON 4 MG/1
4 TABLET, ORALLY DISINTEGRATING ORAL EVERY 6 HOURS PRN
Status: DISCONTINUED | OUTPATIENT
Start: 2022-03-18 | End: 2022-03-19 | Stop reason: HOSPADM

## 2022-03-18 RX ORDER — FENTANYL CITRATE 50 UG/ML
INJECTION, SOLUTION INTRAMUSCULAR; INTRAVENOUS PRN
Status: DISCONTINUED | OUTPATIENT
Start: 2022-03-18 | End: 2022-03-18 | Stop reason: HOSPADM

## 2022-03-18 RX ORDER — PROCHLORPERAZINE MALEATE 10 MG
10 TABLET ORAL EVERY 6 HOURS PRN
Status: DISCONTINUED | OUTPATIENT
Start: 2022-03-18 | End: 2022-03-19 | Stop reason: HOSPADM

## 2022-03-18 RX ORDER — DIPHENHYDRAMINE HYDROCHLORIDE 50 MG/ML
INJECTION INTRAMUSCULAR; INTRAVENOUS PRN
Status: DISCONTINUED | OUTPATIENT
Start: 2022-03-18 | End: 2022-03-18 | Stop reason: HOSPADM

## 2022-03-18 RX ORDER — ONDANSETRON 2 MG/ML
4 INJECTION INTRAMUSCULAR; INTRAVENOUS EVERY 6 HOURS PRN
Status: DISCONTINUED | OUTPATIENT
Start: 2022-03-18 | End: 2022-03-19 | Stop reason: HOSPADM

## 2022-03-18 RX ORDER — NALOXONE HYDROCHLORIDE 0.4 MG/ML
0.2 INJECTION, SOLUTION INTRAMUSCULAR; INTRAVENOUS; SUBCUTANEOUS
Status: DISCONTINUED | OUTPATIENT
Start: 2022-03-18 | End: 2022-03-19 | Stop reason: HOSPADM

## 2022-03-18 RX ORDER — FLUMAZENIL 0.1 MG/ML
0.2 INJECTION, SOLUTION INTRAVENOUS
Status: ACTIVE | OUTPATIENT
Start: 2022-03-18 | End: 2022-03-18

## 2022-03-18 RX ORDER — ONDANSETRON 2 MG/ML
4 INJECTION INTRAMUSCULAR; INTRAVENOUS
Status: DISCONTINUED | OUTPATIENT
Start: 2022-03-18 | End: 2022-03-19 | Stop reason: HOSPADM

## 2022-03-18 RX ORDER — LIDOCAINE 40 MG/G
CREAM TOPICAL
Status: DISCONTINUED | OUTPATIENT
Start: 2022-03-18 | End: 2022-03-19 | Stop reason: HOSPADM

## 2022-03-18 NOTE — H&P
[facility] Anesthesia Pre-op History and Physical    Girish Calderón MRN# [id number]   Age: 21 year old YOB: 2000            Date of Exam 3/18/2022         Primary care provider: Kerline Tellez         Chief Complaint and/or Reason for Procedure:     Reflux, globus sensation         Active problem list:     Patient Active Problem List    Diagnosis Date Noted     Migraine with aura and without status migrainosus, not intractable 12/29/2016     Priority: Medium     Generalized anxiety disorder 01/15/2016     Priority: Medium     Recurrent major depressive disorder, in partial remission (H) 01/15/2016     Priority: Medium            Medications (include herbals and vitamins):   Any Plavix use in the last 7 days? No     Current Outpatient Medications   Medication Sig     amphetamine-dextroamphetamine (ADDERALL XR) 10 MG 24 hr capsule Take 1 capsule (10 mg) by mouth daily     amphetamine-dextroamphetamine (ADDERALL XR) 20 MG 24 hr capsule Take 1 capsule (20 mg) by mouth daily     clobetasol (TEMOVATE) 0.05 % external ointment Apply topically 2 times daily To vaginal area     escitalopram (LEXAPRO) 10 MG tablet Take 1 tablet by mouth once daily     ketoconazole (NIZORAL) 2 % external shampoo Apply topically daily as needed for itching or irritation     levonorgestrel-ethinyl estradiol (AVIANE) 0.1-20 MG-MCG tablet Take 1 tablet by mouth daily     omeprazole (PRILOSEC) 20 MG DR capsule Take 1 capsule by mouth once daily     triamcinolone (ARISTOCORT HP) 0.5 % external cream Apply topically 2 times daily To eczema on arm     triamcinolone (KENALOG) 0.1 % external cream Apply topically 2 times daily     Current Facility-Administered Medications   Medication     lidocaine (LMX4) kit     lidocaine 1 % 0.1-1 mL     ondansetron (ZOFRAN) injection 4 mg     sodium chloride (PF) 0.9% PF flush 3 mL     sodium chloride (PF) 0.9% PF flush 3 mL             Allergies:      Allergies   Allergen Reactions      Soap Rash     Patient states she has sensitive skin and gets rashes from scented soaps.      Allergy to Latex? No  Allergy to tape?   No  Intolerances:             Physical Exam:   All vitals have been reviewed  Patient Vitals for the past 8 hrs:   BP Temp Temp src Resp SpO2   03/18/22 0824 114/74 97.9  F (36.6  C) Temporal 16 98 %     No intake/output data recorded.  Lungs:   No increased work of breathing, good air exchange, clear to auscultation bilaterally, no crackles or wheezing     Cardiovascular:   normal S1 and S2             Lab / Radiology Results:            Anesthetic risk and/or ASA classification:   2    Guera Willett, DO

## 2022-03-22 ENCOUNTER — MYC MEDICAL ADVICE (OUTPATIENT)
Dept: FAMILY MEDICINE | Facility: CLINIC | Age: 22
End: 2022-03-22
Payer: COMMERCIAL

## 2022-03-22 DIAGNOSIS — F90.2 ATTENTION DEFICIT HYPERACTIVITY DISORDER (ADHD), COMBINED TYPE: Primary | ICD-10-CM

## 2022-03-22 RX ORDER — DEXTROAMPHETAMINE SACCHARATE, AMPHETAMINE ASPARTATE, DEXTROAMPHETAMINE SULFATE AND AMPHETAMINE SULFATE 2.5; 2.5; 2.5; 2.5 MG/1; MG/1; MG/1; MG/1
10 TABLET ORAL DAILY
Qty: 30 TABLET | Refills: 0 | Status: SHIPPED | OUTPATIENT
Start: 2022-03-22 | End: 2022-04-17

## 2022-03-24 ENCOUNTER — TELEPHONE (OUTPATIENT)
Dept: FAMILY MEDICINE | Facility: CLINIC | Age: 22
End: 2022-03-24
Payer: COMMERCIAL

## 2022-03-24 DIAGNOSIS — F90.2 ATTENTION DEFICIT HYPERACTIVITY DISORDER (ADHD), COMBINED TYPE: ICD-10-CM

## 2022-03-24 RX ORDER — DEXTROAMPHETAMINE SACCHARATE, AMPHETAMINE ASPARTATE MONOHYDRATE, DEXTROAMPHETAMINE SULFATE AND AMPHETAMINE SULFATE 5; 5; 5; 5 MG/1; MG/1; MG/1; MG/1
20 CAPSULE, EXTENDED RELEASE ORAL DAILY
Qty: 30 CAPSULE | Refills: 0 | Status: SHIPPED | OUTPATIENT
Start: 2022-04-24 | End: 2022-05-24

## 2022-03-24 RX ORDER — DEXTROAMPHETAMINE SACCHARATE, AMPHETAMINE ASPARTATE MONOHYDRATE, DEXTROAMPHETAMINE SULFATE AND AMPHETAMINE SULFATE 5; 5; 5; 5 MG/1; MG/1; MG/1; MG/1
20 CAPSULE, EXTENDED RELEASE ORAL DAILY
Qty: 30 CAPSULE | Refills: 0 | Status: CANCELLED | OUTPATIENT
Start: 2022-03-24

## 2022-03-24 RX ORDER — DEXTROAMPHETAMINE SACCHARATE, AMPHETAMINE ASPARTATE MONOHYDRATE, DEXTROAMPHETAMINE SULFATE AND AMPHETAMINE SULFATE 5; 5; 5; 5 MG/1; MG/1; MG/1; MG/1
20 CAPSULE, EXTENDED RELEASE ORAL DAILY
Qty: 30 CAPSULE | Refills: 0 | Status: SHIPPED | OUTPATIENT
Start: 2022-05-25 | End: 2022-06-06

## 2022-03-24 RX ORDER — DEXTROAMPHETAMINE SACCHARATE, AMPHETAMINE ASPARTATE MONOHYDRATE, DEXTROAMPHETAMINE SULFATE AND AMPHETAMINE SULFATE 5; 5; 5; 5 MG/1; MG/1; MG/1; MG/1
20 CAPSULE, EXTENDED RELEASE ORAL DAILY
Qty: 30 CAPSULE | Refills: 0 | Status: SHIPPED | OUTPATIENT
Start: 2022-03-24 | End: 2022-04-23

## 2022-04-17 ENCOUNTER — MYC REFILL (OUTPATIENT)
Dept: FAMILY MEDICINE | Facility: CLINIC | Age: 22
End: 2022-04-17
Payer: COMMERCIAL

## 2022-04-17 DIAGNOSIS — F90.2 ATTENTION DEFICIT HYPERACTIVITY DISORDER (ADHD), COMBINED TYPE: ICD-10-CM

## 2022-04-18 RX ORDER — DEXTROAMPHETAMINE SACCHARATE, AMPHETAMINE ASPARTATE, DEXTROAMPHETAMINE SULFATE AND AMPHETAMINE SULFATE 2.5; 2.5; 2.5; 2.5 MG/1; MG/1; MG/1; MG/1
10 TABLET ORAL DAILY
Qty: 30 TABLET | Refills: 0 | Status: SHIPPED | OUTPATIENT
Start: 2022-04-18 | End: 2022-05-10

## 2022-04-18 NOTE — TELEPHONE ENCOUNTER
Routing refill request to provider for review/approval because:  Drug not on the [facility] refill protocol   Ashleigh Foy RN

## 2022-04-27 ENCOUNTER — TELEPHONE (OUTPATIENT)
Dept: FAMILY MEDICINE | Facility: CLINIC | Age: 22
End: 2022-04-27
Payer: COMMERCIAL

## 2022-04-27 NOTE — TELEPHONE ENCOUNTER
Reason for Call:  Other    Detailed comments: Pt calling because she has been seeing a GI specialist for the past year and she has not seen any improvements in her symptoms. Symptoms include: Heartburn, nauseous, bloating and cramping, diarrhea daily, stomach pains daily, and this morning she had blood in her stool which is a new symptom. She is wondering what she should do and if she should be seen. She is a little concerned about the blood in her stool. She thinks she should get a CT scan done of her abdomen, wondering if this is something that should be done. Would like a call back to discuss further.      Phone Number Patient can be reached at: Home number on file [phone] (home)    Best Time: anytime    Can we leave a detailed message on this number? YES    Call taken on 4/27/2022 at 2:37 PM by Samina Stanley

## 2022-04-28 NOTE — TELEPHONE ENCOUNTER
Call placed to patient     Patient reports GI doctor reached out to her yesterday regarding symptoms - reported symptoms were not concerning as it was a one time occurrence   States she has an appointment with GI coming up to discuss different treatment options     No further questions/concerns    Norberto Espinal RN

## 2022-05-09 DIAGNOSIS — F90.2 ATTENTION DEFICIT HYPERACTIVITY DISORDER (ADHD), COMBINED TYPE: ICD-10-CM

## 2022-05-09 NOTE — TELEPHONE ENCOUNTER
Patient Is calling asking for the Adderall 10mg instant. Patient take this and the 20mg XR.  Please sent over refill to get to July when she needs an appointment.    Pending Prescriptions:                       Disp   Refills    amphetamine-dextroamphetamine (ADDERALL) *30 tab*0            Sig: Take 1 tablet (10 mg) by mouth daily      [facility] [address] [place] [address] - [address]   [address] [facility] [address]  Phone: [phone] Fax: [phone]

## 2022-05-10 RX ORDER — DEXTROAMPHETAMINE SACCHARATE, AMPHETAMINE ASPARTATE, DEXTROAMPHETAMINE SULFATE AND AMPHETAMINE SULFATE 2.5; 2.5; 2.5; 2.5 MG/1; MG/1; MG/1; MG/1
10 TABLET ORAL DAILY
Qty: 30 TABLET | Refills: 0 | Status: SHIPPED | OUTPATIENT
Start: 2022-05-10 | End: 2022-06-02

## 2022-05-18 ENCOUNTER — VIRTUAL VISIT (OUTPATIENT)
Dept: GASTROENTEROLOGY | Facility: CLINIC | Age: 22
End: 2022-05-18
Payer: COMMERCIAL

## 2022-05-18 DIAGNOSIS — R19.7 DIARRHEA, UNSPECIFIED TYPE: ICD-10-CM

## 2022-05-18 DIAGNOSIS — R14.1 FLATULENCE, ERUCTATION AND GAS PAIN: ICD-10-CM

## 2022-05-18 DIAGNOSIS — R14.0 BLOATING: ICD-10-CM

## 2022-05-18 DIAGNOSIS — R14.3 FLATULENCE, ERUCTATION AND GAS PAIN: ICD-10-CM

## 2022-05-18 DIAGNOSIS — R10.84 GENERALIZED ABDOMINAL PAIN: Primary | ICD-10-CM

## 2022-05-18 DIAGNOSIS — R63.4 WEIGHT LOSS: ICD-10-CM

## 2022-05-18 DIAGNOSIS — R14.2 FLATULENCE, ERUCTATION AND GAS PAIN: ICD-10-CM

## 2022-05-18 PROCEDURE — 99214 OFFICE O/P EST MOD 30 MIN: CPT | Mod: 95 | Performed by: INTERNAL MEDICINE

## 2022-05-18 RX ORDER — HYOSCYAMINE SULFATE 0.125 MG
0.12 TABLET ORAL EVERY 4 HOURS PRN
Qty: 90 TABLET | Refills: 0 | Status: SHIPPED | OUTPATIENT
Start: 2022-05-18 | End: 2022-07-19

## 2022-05-18 NOTE — PROGRESS NOTES
Girish is a 22 year old who is being evaluated via a billable video visit.      How would you like to obtain your AVS? [employer]  If the video visit is dropped, the invitation should be resent by: Text to cell phone: [phone]  Will anyone else be joining your video visit? No

## 2022-05-18 NOTE — PATIENT INSTRUCTIONS
Please submit stool samples and have a CT scan done.      If you have blood in the stool again - please schedule a colonoscopy - this has been ordered - call [phone] to schedule    Try to avoid eating one large meal a day (especially at bedtime) - instead eat small meals and snacks every 1-2 hours throughout the day    You can try levsin (hyoscyamine) as needed for abdominal pain/cramping.  You may also want to add a fiber supplement like citrucel or metamucil to help make your stool more formed.    Try decreasing the omeprazole to every other day - if that goes well you can try stopping it.

## 2022-05-18 NOTE — PROGRESS NOTES
"       HPI:    Girish  presents today for a video visit for follow-up.  Overall symptoms are unchanged.  Did have an episode of hematochezia but that was only one time. Now having diarrhea 2-3 times at least a day.  Every few weeks will have a severe \"flare\" where it occurs much more frequently and is associated with abdominal cramping.      Also has occasional chest pain, reflux and gas/belching.  Does think this has improved somewhat with omeprazole - tried stopping it for a few days and symptoms returned.    Has lost ~ 15lbs over the last few months.  Was put on aderall which she thinks may have contributed but thinks weight loss started prior to being put on it.    Generally doesn't eat much during the day - then will eat a large meal prior to going to bed.  Has been having night sweats the last few months and wondering if it contributes.    Does report anal irritation/itching.  Reports being treated empirically in the past for pinworms with no relief.      No past medical history on file.    Past Surgical History:   Procedure Laterality Date     COMBINED ESOPHAGOSCOPY, GASTROSCOPY, DUODENOSCOPY (EGD) WITH CO2 INSUFFLATION N/A 3/18/2022    Procedure: ESOPHAGOGASTRODUODENOSCOPY, WITH CO2 INSUFFLATION;  Surgeon: Guera Willett DO;  Location:  OR     MYRINGOTOMY, INSERT TUBE, COMBINED  5/16/2011    Procedure:COMBINED MYRINGOTOMY, INSERT TUBE; Surgeon:GERARDO MATUTE; Location:[facility]     MYRINGOTOMY, INSERT TUBE, COMBINED Left 9/10/2014    Procedure: COMBINED MYRINGOTOMY, INSERT TUBE;  Surgeon: Greardo Matute MD;  Location: [facility] OR       Family History   Problem Relation Age of Onset     Hypertension Mother      Lung Cancer Mother         approx age 45     Colon Cancer Mother      Other Cancer Mother         liver     Cancer - colorectal Maternal Grandmother      Breast Cancer Paternal Grandmother      Depression Paternal Grandmother      Prostate Cancer Paternal Grandfather      Heart Disease " Paternal Grandfather      Depression Paternal Grandfather      Heart Disease Paternal Uncle 52        heart attack      Heart Disease Maternal Grandfather      Depression Father      Prostate Cancer Father      Anxiety Disorder Sister      C.A.D. No family hx of      Diabetes No family hx of      Cerebrovascular Disease No family hx of        Social History     Tobacco Use     Smoking status: Passive Smoke Exposure - Never Smoker     Smokeless tobacco: Never Used     Tobacco comment: father smokes outside   Substance Use Topics     Alcohol use: Yes     Comment: occ.        O:    Gen: no acute distress  HEENT: NCAT  Neck: normal ROM  Resp: nonlabored breathing  Neuro: no gross deficits  Psych: appropriate mood and affect    EGD 3/2022 - normal    Assessment and Plan:    # abdominal cramping, diarrhea, bloating - likely irritable bowel syndrome but other etiologies should be evaluated as well.  Will check stool studies including a fecal calprotectin.  If calpro is elevated, will need colonoscopy for further evaluation.  In the interim, can trial an anti-spasmodic.  Can also use fiber supplements.  Did discuss avoiding eating one large meal a day, especially at bedtime.    # belching, reflux, chest pain - some improvement on PPI - will try decreasing to every other day    # BRBPR - single episode - if recurs, will need colonoscopy for further evaluation    RTC 6 months    Guera Willett,      Video-Visit Details     Type of service:  Video Visit     Video Start Time: 3:31 PM  Video End Time (time video stopped): 3:48 PM    Originating Location (pt. Location): home     Distant Location (provider location):  [facility]      Mode of Communication:  Video Conference via [employer]

## 2022-05-20 ENCOUNTER — LAB (OUTPATIENT)
Dept: LAB | Facility: CLINIC | Age: 22
End: 2022-05-20
Payer: COMMERCIAL

## 2022-05-20 DIAGNOSIS — R14.0 BLOATING: ICD-10-CM

## 2022-05-20 DIAGNOSIS — R19.7 DIARRHEA, UNSPECIFIED TYPE: ICD-10-CM

## 2022-05-20 DIAGNOSIS — R14.3 FLATULENCE, ERUCTATION AND GAS PAIN: ICD-10-CM

## 2022-05-20 DIAGNOSIS — R14.1 FLATULENCE, ERUCTATION AND GAS PAIN: ICD-10-CM

## 2022-05-20 DIAGNOSIS — R10.84 GENERALIZED ABDOMINAL PAIN: ICD-10-CM

## 2022-05-20 DIAGNOSIS — R63.4 WEIGHT LOSS: ICD-10-CM

## 2022-05-20 DIAGNOSIS — R14.2 FLATULENCE, ERUCTATION AND GAS PAIN: ICD-10-CM

## 2022-05-20 LAB — C DIFF TOX B STL QL: NEGATIVE

## 2022-05-20 PROCEDURE — 83993 ASSAY FOR CALPROTECTIN FECAL: CPT

## 2022-05-20 PROCEDURE — 87209 SMEAR COMPLEX STAIN: CPT

## 2022-05-20 PROCEDURE — 87177 OVA AND PARASITES SMEARS: CPT

## 2022-05-20 PROCEDURE — 87506 IADNA-DNA/RNA PROBE TQ 6-11: CPT

## 2022-05-20 PROCEDURE — 87493 C DIFF AMPLIFIED PROBE: CPT | Mod: 59

## 2022-05-23 ENCOUNTER — HOSPITAL ENCOUNTER (OUTPATIENT)
Dept: CT IMAGING | Facility: CLINIC | Age: 22
Discharge: HOME | End: 2022-05-23
Attending: INTERNAL MEDICINE | Admitting: INTERNAL MEDICINE
Payer: COMMERCIAL

## 2022-05-23 DIAGNOSIS — R14.2 FLATULENCE, ERUCTATION AND GAS PAIN: ICD-10-CM

## 2022-05-23 DIAGNOSIS — R14.0 BLOATING: ICD-10-CM

## 2022-05-23 DIAGNOSIS — R14.3 FLATULENCE, ERUCTATION AND GAS PAIN: ICD-10-CM

## 2022-05-23 DIAGNOSIS — R19.7 DIARRHEA, UNSPECIFIED TYPE: ICD-10-CM

## 2022-05-23 DIAGNOSIS — R14.1 FLATULENCE, ERUCTATION AND GAS PAIN: ICD-10-CM

## 2022-05-23 DIAGNOSIS — R63.4 WEIGHT LOSS: ICD-10-CM

## 2022-05-23 DIAGNOSIS — R10.84 GENERALIZED ABDOMINAL PAIN: ICD-10-CM

## 2022-05-23 LAB
CALPROTECTIN STL-MCNT: 42.5 MG/KG (ref 0–49.9)
O+P STL MICRO: NEGATIVE

## 2022-05-23 PROCEDURE — 74177 CT ABD & PELVIS W/CONTRAST: CPT

## 2022-05-23 PROCEDURE — 250N000011 HC RX IP 250 OP 636: Performed by: RADIOLOGY

## 2022-05-23 PROCEDURE — 250N000009 HC RX 250: Performed by: RADIOLOGY

## 2022-05-23 RX ORDER — IOPAMIDOL 755 MG/ML
65 INJECTION, SOLUTION INTRAVASCULAR ONCE
Status: COMPLETED | OUTPATIENT
Start: 2022-05-23 | End: 2022-05-23

## 2022-05-23 RX ADMIN — IOPAMIDOL 65 ML: 755 INJECTION, SOLUTION INTRAVENOUS at 15:11

## 2022-05-23 RX ADMIN — SODIUM CHLORIDE 56 ML: 9 INJECTION, SOLUTION INTRAVENOUS at 15:11

## 2022-06-02 ENCOUNTER — MYC REFILL (OUTPATIENT)
Dept: FAMILY MEDICINE | Facility: CLINIC | Age: 22
End: 2022-06-02
Payer: COMMERCIAL

## 2022-06-02 DIAGNOSIS — F90.2 ATTENTION DEFICIT HYPERACTIVITY DISORDER (ADHD), COMBINED TYPE: ICD-10-CM

## 2022-06-02 RX ORDER — DEXTROAMPHETAMINE SACCHARATE, AMPHETAMINE ASPARTATE, DEXTROAMPHETAMINE SULFATE AND AMPHETAMINE SULFATE 2.5; 2.5; 2.5; 2.5 MG/1; MG/1; MG/1; MG/1
10 TABLET ORAL DAILY
Qty: 30 TABLET | Refills: 0 | Status: SHIPPED | OUTPATIENT
Start: 2022-06-02 | End: 2022-06-08

## 2022-06-02 NOTE — TELEPHONE ENCOUNTER
Routing refill request to provider for review/approval because:  Drug not on the [facility] refill protocol     Norberto Espinal RN

## 2022-06-06 ENCOUNTER — TELEPHONE (OUTPATIENT)
Dept: FAMILY MEDICINE | Facility: CLINIC | Age: 22
End: 2022-06-06
Payer: COMMERCIAL

## 2022-06-06 DIAGNOSIS — F90.2 ATTENTION DEFICIT HYPERACTIVITY DISORDER (ADHD), COMBINED TYPE: ICD-10-CM

## 2022-06-06 RX ORDER — DEXTROAMPHETAMINE SACCHARATE, AMPHETAMINE ASPARTATE MONOHYDRATE, DEXTROAMPHETAMINE SULFATE AND AMPHETAMINE SULFATE 5; 5; 5; 5 MG/1; MG/1; MG/1; MG/1
20 CAPSULE, EXTENDED RELEASE ORAL DAILY
Qty: 30 CAPSULE | Refills: 0 | Status: SHIPPED | OUTPATIENT
Start: 2022-06-06 | End: 2022-06-06

## 2022-06-06 NOTE — TELEPHONE ENCOUNTER
Please have her contact her pharmacy and see what they would need from me for her to get the usual brand of meds    Thanks    EB

## 2022-06-06 NOTE — TELEPHONE ENCOUNTER
"Girish Samson called and reports that she has been taking generic adderall extended release for about 5 months. She said that it has worked really well and has had no side effects until she picked up the last order on 5/26/22. She took it every day with the last time taken was yesterday morning. She stopped it because she has been having worsening symptoms since day 2 ( May27th)of taking this latest dispensing.   She said that the pill looks different; that they are blue. She said that the pharmacy did not tell her it was a different  until she called this morning. She said that they dispensed this one because the regular brand she had been taking was out of stock. They do have her regular brand back in stock now. She said that her worsening symptoms were:  \"Feeling weird, like feeling the opposite of what it should do.\"  Short of breath.  Tired  Felt like a zombie  Irritable  Face flushing and swollen  Heart palpitations  Blurry vision.  Denies swelling of lips and tongue    She said that she takes 10 mg short acting adderall daily as well and plans to pick that up today.   I advised her to not take any more of the blue adderall XR pills.   I advised her to take 50 mg oral benadryl  I advised her that if symptoms worsen and she develops swelling of lips and tongue; to go to the Emergency room.  How do you advise?  Thank you.  Aram Tolliver RN        "

## 2022-06-06 NOTE — TELEPHONE ENCOUNTER
Call placed to patient   Relayed Dr. Tellez message    Patient verbalized understanding  No further questions/concerns    Norberto Espinal RN

## 2022-06-08 ENCOUNTER — TELEPHONE (OUTPATIENT)
Dept: FAMILY MEDICINE | Facility: CLINIC | Age: 22
End: 2022-06-08

## 2022-06-08 DIAGNOSIS — F90.2 ATTENTION DEFICIT HYPERACTIVITY DISORDER (ADHD), COMBINED TYPE: ICD-10-CM

## 2022-06-08 RX ORDER — DEXTROAMPHETAMINE SACCHARATE, AMPHETAMINE ASPARTATE MONOHYDRATE, DEXTROAMPHETAMINE SULFATE AND AMPHETAMINE SULFATE 5; 5; 5; 5 MG/1; MG/1; MG/1; MG/1
CAPSULE, EXTENDED RELEASE ORAL
Qty: 30 CAPSULE | Refills: 0 | Status: SHIPPED | OUTPATIENT
Start: 2022-07-04 | End: 2022-07-19

## 2022-06-08 RX ORDER — DEXTROAMPHETAMINE SACCHARATE, AMPHETAMINE ASPARTATE, DEXTROAMPHETAMINE SULFATE AND AMPHETAMINE SULFATE 2.5; 2.5; 2.5; 2.5 MG/1; MG/1; MG/1; MG/1
10 TABLET ORAL DAILY
Qty: 30 TABLET | Refills: 0 | Status: SHIPPED | OUTPATIENT
Start: 2022-07-04 | End: 2022-06-13

## 2022-06-08 NOTE — TELEPHONE ENCOUNTER
Patient called stating she has an appt for 07/19-- however she will be out her medication before OV. There was not an appt  for a f2f visit until Tuesday 7/19-- patient wanted physical and med check done at the same time.    Requesting refill until OV.  Patient will be out of meds on July 6-- due to reaction to medication, patient is switching pharmacy to [facility] vs [facility]

## 2022-06-13 RX ORDER — DEXTROAMPHETAMINE SACCHARATE, AMPHETAMINE ASPARTATE, DEXTROAMPHETAMINE SULFATE AND AMPHETAMINE SULFATE 2.5; 2.5; 2.5; 2.5 MG/1; MG/1; MG/1; MG/1
10 TABLET ORAL DAILY
Qty: 30 TABLET | Refills: 0 | Status: SHIPPED | OUTPATIENT
Start: 2022-07-04 | End: 2022-06-30

## 2022-06-20 ENCOUNTER — TELEPHONE (OUTPATIENT)
Dept: GASTROENTEROLOGY | Facility: CLINIC | Age: 22
End: 2022-06-20
Payer: COMMERCIAL

## 2022-06-20 NOTE — TELEPHONE ENCOUNTER
6/20 Provided phone number [phone] to schedule follow up  in about 6 months (around 11/18/2022).    Cindy ray Procedure   Orthopedics, Podiatry, Sports Medicine, ENT/Eye Specialties  [facility]   [phone]

## 2022-06-30 DIAGNOSIS — F90.2 ATTENTION DEFICIT HYPERACTIVITY DISORDER (ADHD), COMBINED TYPE: ICD-10-CM

## 2022-06-30 RX ORDER — DEXTROAMPHETAMINE SACCHARATE, AMPHETAMINE ASPARTATE, DEXTROAMPHETAMINE SULFATE AND AMPHETAMINE SULFATE 2.5; 2.5; 2.5; 2.5 MG/1; MG/1; MG/1; MG/1
10 TABLET ORAL DAILY
Qty: 30 TABLET | Refills: 0 | Status: SHIPPED | OUTPATIENT
Start: 2022-07-04 | End: 2022-07-19

## 2022-06-30 NOTE — TELEPHONE ENCOUNTER
Patient's 10mg adderall failed to [facility] to [facility]. Please re-send script. Also, patient wondering if she could fill 7/3 since 7/4 is a holiday and pharmacy is closed.       Thank you,    Freya Hogue, LYNN Cruz

## 2022-07-19 ENCOUNTER — OFFICE VISIT (OUTPATIENT)
Dept: FAMILY MEDICINE | Facility: CLINIC | Age: 22
End: 2022-07-19
Payer: COMMERCIAL

## 2022-07-19 VITALS
HEART RATE: 122 BPM | SYSTOLIC BLOOD PRESSURE: 118 MMHG | HEIGHT: 63 IN | OXYGEN SATURATION: 100 % | WEIGHT: 118.25 LBS | TEMPERATURE: 98.3 F | DIASTOLIC BLOOD PRESSURE: 80 MMHG | BODY MASS INDEX: 20.95 KG/M2 | RESPIRATION RATE: 16 BRPM

## 2022-07-19 DIAGNOSIS — F90.2 ATTENTION DEFICIT HYPERACTIVITY DISORDER (ADHD), COMBINED TYPE: ICD-10-CM

## 2022-07-19 DIAGNOSIS — Z11.3 SCREENING FOR STDS (SEXUALLY TRANSMITTED DISEASES): ICD-10-CM

## 2022-07-19 DIAGNOSIS — F41.9 ANXIETY: ICD-10-CM

## 2022-07-19 DIAGNOSIS — N76.0 VAGINITIS AND VULVOVAGINITIS: ICD-10-CM

## 2022-07-19 DIAGNOSIS — Z00.00 HEALTHCARE MAINTENANCE: Primary | ICD-10-CM

## 2022-07-19 DIAGNOSIS — Z30.430 ENCOUNTER FOR IUD INSERTION: ICD-10-CM

## 2022-07-19 DIAGNOSIS — Z12.4 CERVICAL CANCER SCREENING: ICD-10-CM

## 2022-07-19 PROCEDURE — G0145 SCR C/V CYTO,THINLAYER,RESCR: HCPCS | Performed by: FAMILY MEDICINE

## 2022-07-19 PROCEDURE — 99214 OFFICE O/P EST MOD 30 MIN: CPT | Mod: 25 | Performed by: FAMILY MEDICINE

## 2022-07-19 PROCEDURE — 87210 SMEAR WET MOUNT SALINE/INK: CPT | Performed by: FAMILY MEDICINE

## 2022-07-19 PROCEDURE — 87491 CHLMYD TRACH DNA AMP PROBE: CPT | Performed by: FAMILY MEDICINE

## 2022-07-19 PROCEDURE — 90715 TDAP VACCINE 7 YRS/> IM: CPT | Performed by: FAMILY MEDICINE

## 2022-07-19 PROCEDURE — 90471 IMMUNIZATION ADMIN: CPT | Performed by: FAMILY MEDICINE

## 2022-07-19 PROCEDURE — 87591 N.GONORRHOEAE DNA AMP PROB: CPT | Performed by: FAMILY MEDICINE

## 2022-07-19 PROCEDURE — 99395 PREV VISIT EST AGE 18-39: CPT | Mod: 25 | Performed by: FAMILY MEDICINE

## 2022-07-19 RX ORDER — DEXTROAMPHETAMINE SACCHARATE, AMPHETAMINE ASPARTATE MONOHYDRATE, DEXTROAMPHETAMINE SULFATE AND AMPHETAMINE SULFATE 5; 5; 5; 5 MG/1; MG/1; MG/1; MG/1
CAPSULE, EXTENDED RELEASE ORAL
Qty: 30 CAPSULE | Refills: 0 | Status: SHIPPED | OUTPATIENT
Start: 2022-07-19 | End: 2022-08-28

## 2022-07-19 RX ORDER — FLUOXETINE 10 MG/1
10 CAPSULE ORAL DAILY
Qty: 90 CAPSULE | Refills: 3 | Status: SHIPPED | OUTPATIENT
Start: 2022-07-19 | End: 2022-11-09 | Stop reason: DRUGHIGH

## 2022-07-19 RX ORDER — MISOPROSTOL 200 UG/1
400 TABLET ORAL ONCE
Qty: 2 TABLET | Refills: 0 | Status: SHIPPED | OUTPATIENT
Start: 2022-07-19 | End: 2022-07-19

## 2022-07-19 RX ORDER — DEXTROAMPHETAMINE SACCHARATE, AMPHETAMINE ASPARTATE, DEXTROAMPHETAMINE SULFATE AND AMPHETAMINE SULFATE 2.5; 2.5; 2.5; 2.5 MG/1; MG/1; MG/1; MG/1
10 TABLET ORAL DAILY
Qty: 30 TABLET | Refills: 0 | Status: SHIPPED | OUTPATIENT
Start: 2022-07-19 | End: 2022-08-28

## 2022-07-19 ASSESSMENT — ENCOUNTER SYMPTOMS
SHORTNESS OF BREATH: 1
BREAST MASS: 0
HEMATOCHEZIA: 1
HEARTBURN: 1
NERVOUS/ANXIOUS: 1
ABDOMINAL PAIN: 1
PALPITATIONS: 1
HEADACHES: 1
PARESTHESIAS: 1

## 2022-07-19 ASSESSMENT — PATIENT HEALTH QUESTIONNAIRE - PHQ9
SUM OF ALL RESPONSES TO PHQ QUESTIONS 1-9: 20
10. IF YOU CHECKED OFF ANY PROBLEMS, HOW DIFFICULT HAVE THESE PROBLEMS MADE IT FOR YOU TO DO YOUR WORK, TAKE CARE OF THINGS AT HOME, OR GET ALONG WITH OTHER PEOPLE: VERY DIFFICULT
SUM OF ALL RESPONSES TO PHQ QUESTIONS 1-9: 20

## 2022-07-19 NOTE — PROGRESS NOTES
Answers for HPI/ROS submitted by the patient on 7/19/2022  If you checked off any problems, how difficult have these problems made it for you to do your work, take care of things at home, or get along with other people?: Very difficult  PHQ9 TOTAL SCORE: 20  Frequency of exercise:: 4-5 days/week  Getting at least 3 servings of Calcium per day:: Yes  Diet:: Other  Taking medications regularly:: Yes  Medication side effects:: Other  Bi-annual eye exam:: NO  Dental care twice a year:: Yes  Sleep apnea or symptoms of sleep apnea:: None  abdominal pain: Yes  Blood in stool: Yes  chest pain: Yes  nervous/anxious: Yes  headaches: Yes  heartburn: Yes  mood changes: Yes  palpitations: Yes  Skin sensation changes: Yes  rash: Yes  shortness of breath: Yes  pelvic pain: No  vaginal bleeding: No  vaginal discharge: No  tenderness: No  breast mass: No  breast discharge: No  Additional concerns today:: No  Duration of exercise:: 45-60 minutes        Assessment/Plan:     Health maintenance female exam.  All questions answered.  Await pap smear results.  Breast self exam technique reviewed and patient encouraged to perform self-exam monthly.  Discussed healthy lifestyle modifications.      Healthcare maintenance    Cervical cancer screening  - Pap Screen only - recommended age 21 - 24 years    Vaginitis and vulvovaginitis  We discussed this at length today.  She continues to appear to have irritation on examination.  I have once again recommended that she use the triamcinolone cream twice daily for 10 to 14 days.  Certainly could prescribe something a bit stronger as well.  This appears to be chronic irritant.  Wet prep is negative for infection but does show 3+ white blood cells.    She is stopped her oral contraceptive pills feeling that the estrogen may be is causing issues.  She wanted to discuss the IUD today and we have discussed that and are planning on placing 1 after her next period.    Anxiety  Initiate Prozac today at a  10 mg dose.  - FLUoxetine (PROZAC) 10 MG capsule  Dispense: 90 capsule; Refill: 3    Screening for STDs (sexually transmitted diseases)  - NEISSERIA GONORRHOEA PCR  - CHLAMYDIA TRACHOMATIS PCR  - Wet prep - Clinic Collect    Attention deficit hyperactivity disorder (ADHD), combined type  Refill Adderall sent to the pharmacy  - amphetamine-dextroamphetamine (ADDERALL) 10 MG tablet  Dispense: 30 tablet; Refill: 0  - amphetamine-dextroamphetamine (ADDERALL XR) 20 MG 24 hr capsule  Dispense: 30 capsule; Refill: 0    Encounter for IUD insertion  Cytotec sent to the pharmacy.  Discussed again types of IUDs and she will consider what she would like.  - misoprostol (CYTOTEC) 200 MCG tablet  Dispense: 2 tablet; Refill: 0        Patient has been advised of split billing requirements and indicates understanding: Yes      Subjective:     Girish Calderón is a 22 year old female who presents for an annual exam.  She has a multitude of concerns to discuss today:    1.  Vaginitis: History of vaginitis.  Has been treated for bacterial vaginosis, yeast infection and pinworms.  Medications and treatments have helped slightly but issue has returned.  She was using wet wipes and stopped using those and thought that that may be helped a bit but she is now using those again.  I had given her triamcinolone cream which she used twice daily for a while.  I followed up with her 5 days after giving that and she states that she was doing better although now she states that she is unsure if she was doing much better with that.  She is also stopped taking her birth control which it was estrogen/progesterone to see if this is helpful as well.    2.  Anxiety: History of anxiety.  Was previously on Lexapro and stopped taking this medication because she did not feel so she needed it anymore.  She now feels as though she probably should be on something.  She does not think she would want to go back on Lexapro but would maybe want to try something  different.    3.  ADHD: Overall doing well.  Needs a refill of her medications.    4.  Contraception: Patient states that she will be abstinent or use condoms but is hopeful to get either nonhormonal form of contraception or a progesterone only form.  She had issues with her Nexplanon including spotting and had this removed..    Healthy Habits:   Regular Exercise: Yes  Sunscreen Use: Yes  Healthy Diet: yes  Dental Visits Regularly: yes  Seat Belt: Yes  Self Breast Exam Monthly: yes  Prevention of Osteoporosis: yes    Immunization History   Administered Date(s) Administered     DTAP (<7y) 2000, 2000, 2000, 08/23/2001, 07/01/2005, 07/13/2012     HEPA 11/06/2015     HPV 06/19/2014, 07/28/2014, 07/20/2015     HepB 2000, 2000, 08/23/2001     Hib (PRP-T) 2000     Influenza Vaccine IM > 6 months Valent IIV4 (Alfuria,Fluzone) 11/06/2015, 12/01/2020, 12/28/2021     MMR 05/08/2001, 07/01/2005     Meningococcal (Menactra ) 07/13/2012     Poliovirus, inactivated (IPV) 2000, 2000, 05/08/2001, 07/01/2005     Tdap (Adacel,Boostrix) 07/13/2012, 07/19/2022     Varicella 05/08/2001, 07/13/2012         Gynecologic History  Patient's last menstrual period was 07/10/2022 (within days).  Contraception: abstinence  Last Pap: n/a. l      OB History   No obstetric history on file.       Current Outpatient Medications   Medication Sig Dispense Refill     amphetamine-dextroamphetamine (ADDERALL XR) 20 MG 24 hr capsule TAKE 1 CAPSULE BY MOUTH ONCE DAILY 30 capsule 0     amphetamine-dextroamphetamine (ADDERALL) 10 MG tablet Take 1 tablet (10 mg) by mouth daily 30 tablet 0     FLUoxetine (PROZAC) 10 MG capsule Take 1 capsule (10 mg) by mouth daily 90 capsule 3     ketoconazole (NIZORAL) 2 % external shampoo Apply topically daily as needed for itching or irritation 120 mL 1     triamcinolone (KENALOG) 0.1 % external cream Apply topically 2 times daily 80 g 0     No past medical history on  file.  Past Surgical History:   Procedure Laterality Date     COMBINED ESOPHAGOSCOPY, GASTROSCOPY, DUODENOSCOPY (EGD) WITH CO2 INSUFFLATION N/A 3/18/2022    Procedure: ESOPHAGOGASTRODUODENOSCOPY, WITH CO2 INSUFFLATION;  Surgeon: Guera Willett DO;  Location: [facility] OR     MYRINGOTOMY, INSERT TUBE, COMBINED  5/16/2011    Procedure:COMBINED MYRINGOTOMY, INSERT TUBE; Surgeon:GERARDO MATUTE; Location:[facility] OR     MYRINGOTOMY, INSERT TUBE, COMBINED Left 9/10/2014    Procedure: COMBINED MYRINGOTOMY, INSERT TUBE;  Surgeon: Gerardo Matute MD;  Location: [facility] OR     Soap  Family History   Problem Relation Age of Onset     Hypertension Mother      Lung Cancer Mother         approx age 45     Colon Cancer Mother      Other Cancer Mother         liver     Cancer - colorectal Maternal Grandmother      Breast Cancer Paternal Grandmother      Depression Paternal Grandmother      Prostate Cancer Paternal Grandfather      Heart Disease Paternal Grandfather      Depression Paternal Grandfather      Heart Disease Paternal Uncle 52        heart attack      Heart Disease Maternal Grandfather      Depression Father      Prostate Cancer Father      Anxiety Disorder Sister      C.A.D. No family hx of      Diabetes No family hx of      Cerebrovascular Disease No family hx of      Social History     Socioeconomic History     Marital status: Single     Spouse name: Not on file     Number of children: Not on file     Years of education: Not on file     Highest education level: Not on file   Occupational History     Not on file   Tobacco Use     Smoking status: Passive Smoke Exposure - Never Smoker     Smokeless tobacco: Never Used     Tobacco comment: father smokes outside   Substance and Sexual Activity     Alcohol use: Yes     Comment: occ.     Drug use: No     Sexual activity: Yes     Partners: Male     Birth control/protection: Implant   Other Topics Concern     Not on file   Social History Narrative     Not on file     Social  "Determinants of Health     Financial Resource Strain: Not on file   Food Insecurity: Not on file   Transportation Needs: Not on file   Physical Activity: Not on file   Stress: Not on file   Social Connections: Not on file   Intimate Partner Violence: Not on file   Housing Stability: Not on file       Review of Systems  12 point review of systems was completed and found to be negative except for what is been stated above.      Objective:      Vitals:    07/19/22 1010   BP: 118/80   Pulse: (!) 122   Resp: 16   Temp: 98.3  F (36.8  C)   TempSrc: Tympanic   SpO2: 100%   Weight: 53.6 kg (118 lb 4 oz)   Height: 1.588 m (5' 2.5\")         Physical Exam:  General Appearance: Alert, cooperative, no distress, appears stated age   Head: Normocephalic, without obvious abnormality, atraumatic  Eyes: PERRL, conjunctiva/corneas clear, EOM's intact   Ears: Normal TM's and external ear canals, both ears  Neck: Supple, symmetrical, trachea midline, no adenopathy;  thyroid: not enlarged, symmetric, no tenderness/mass/nodules  Back: Symmetric, no curvature, ROM normal,  Lungs: Clear to auscultation bilaterally, respirations unlabored  Breasts: No breast masses, tenderness, asymmetry, or nipple discharge.  Heart: Regular rate and rhythm, S1 and S2 normal, no murmur, rub, or gallop  Abdomen: Soft, non-tender, bowel sounds active all four quadrants,  no masses, no organomegaly  Pelvic: Genitalia appears to have chronic inflammation with loss of some of the external architecture,  normal appearing vaginal mucosa and cervix -moderate amount of normal-appearing white discharge  Extremities: Extremities normal, atraumatic, no cyanosis or edema  Skin: Skin color, texture, turgor normal, no rashes or lesions  Lymph nodes: Cervical, supraclavicular, and axillary nodes normal and   Neurologic: Normal     "

## 2022-07-20 LAB
C TRACH DNA SPEC QL NAA+PROBE: NEGATIVE
N GONORRHOEA DNA SPEC QL NAA+PROBE: NEGATIVE

## 2022-07-21 LAB
BKR LAB AP GYN ADEQUACY: NORMAL
BKR LAB AP GYN INTERPRETATION: NORMAL
BKR LAB AP HPV REFLEX: NO
BKR LAB AP PREVIOUS ABNORMAL: NORMAL
PATH REPORT.COMMENTS IMP SPEC: NORMAL
PATH REPORT.COMMENTS IMP SPEC: NORMAL
PATH REPORT.RELEVANT HX SPEC: NORMAL

## 2022-08-12 ENCOUNTER — TELEPHONE (OUTPATIENT)
Dept: FAMILY MEDICINE | Facility: CLINIC | Age: 22
End: 2022-08-12

## 2022-08-12 ENCOUNTER — OFFICE VISIT (OUTPATIENT)
Dept: FAMILY MEDICINE | Facility: CLINIC | Age: 22
End: 2022-08-12
Payer: COMMERCIAL

## 2022-08-12 VITALS
DIASTOLIC BLOOD PRESSURE: 64 MMHG | OXYGEN SATURATION: 99 % | HEIGHT: 63 IN | RESPIRATION RATE: 20 BRPM | HEART RATE: 110 BPM | TEMPERATURE: 98.5 F | WEIGHT: 118.13 LBS | BODY MASS INDEX: 20.93 KG/M2 | SYSTOLIC BLOOD PRESSURE: 110 MMHG

## 2022-08-12 DIAGNOSIS — Z30.430 ENCOUNTER FOR INSERTION OF INTRAUTERINE CONTRACEPTIVE DEVICE: ICD-10-CM

## 2022-08-12 DIAGNOSIS — Z30.430 ENCOUNTER FOR IUD INSERTION: Primary | ICD-10-CM

## 2022-08-12 LAB — HCG UR QL: NEGATIVE

## 2022-08-12 PROCEDURE — 99207 PR DROP WITH A PROCEDURE: CPT | Performed by: FAMILY MEDICINE

## 2022-08-12 PROCEDURE — 58300 INSERT INTRAUTERINE DEVICE: CPT | Performed by: FAMILY MEDICINE

## 2022-08-12 PROCEDURE — 81025 URINE PREGNANCY TEST: CPT | Performed by: FAMILY MEDICINE

## 2022-08-12 RX ORDER — COPPER 313.4 MG/1
1 INTRAUTERINE DEVICE INTRAUTERINE ONCE
Status: COMPLETED
Start: 2022-08-12 | End: 2022-08-12

## 2022-08-12 RX ADMIN — COPPER 1 EACH: 313.4 INTRAUTERINE DEVICE INTRAUTERINE at 13:06

## 2022-08-12 NOTE — PROGRESS NOTES
Answers for HPI/ROS submitted by the patient on 8/12/2022  What is the reason for your visit today? : IUD insertion  How many servings of fruits and vegetables do you eat daily?: 2-3  On average, how many sweetened beverages do you drink each day (Examples: soda, juice, sweet tea, etc.  Do NOT count diet or artificially sweetened beverages)?: 0  How many minutes a day do you exercise enough to make your heart beat faster?: 60 or more  How many days a week do you exercise enough to make your heart beat faster?: 6  How many days per week do you miss taking your medication?: 0    IUD Insertion Procedure Note    Pre-operative Diagnosis: contraception desired    Post-operative Diagnosis: contraception placed    Indications: contraception    Procedure Details   Urine pregnancy test was done today and result was negative.  The risks (including infection, bleeding, pain, and uterine perforation) and benefits of the procedure were explained to the patient and Written informed consent was obtained.      Cervix cleansed with Betadine. Uterus sounded to 7 cm. IUD inserted without difficulty under sterile techniquw. String visible and trimmed. Patient tolerated procedure well.    IUD Information:  Mirena.    Condition:  Stable    Complications:  None    Plan:    The patient was advised to call for any fever or for prolonged or severe pain or bleeding. She was advised to use NSAID as needed for mild to moderate pain.

## 2022-08-12 NOTE — TELEPHONE ENCOUNTER
Patient's call transferred to author     Patient states she has an appointment this afternoon for an IUD placement  States she read the instructions wrong on the Cytotec - was supposed to insert medication 4 hours before procedure   Wondering if she can still move forward with the procedure this afternoon     Reviewed with Dr. Geoff Tellez states patient can insert the medication now and proceed with scheduled procedure this afternoon     Relayed to patient  Patient verbalized understanding  No further questions/concerns    Norberto Espinal RN

## 2022-08-28 ENCOUNTER — MYC REFILL (OUTPATIENT)
Dept: FAMILY MEDICINE | Facility: CLINIC | Age: 22
End: 2022-08-28

## 2022-08-28 DIAGNOSIS — F90.2 ATTENTION DEFICIT HYPERACTIVITY DISORDER (ADHD), COMBINED TYPE: ICD-10-CM

## 2022-08-29 RX ORDER — DEXTROAMPHETAMINE SACCHARATE, AMPHETAMINE ASPARTATE MONOHYDRATE, DEXTROAMPHETAMINE SULFATE AND AMPHETAMINE SULFATE 5; 5; 5; 5 MG/1; MG/1; MG/1; MG/1
CAPSULE, EXTENDED RELEASE ORAL
Qty: 30 CAPSULE | Refills: 0 | Status: SHIPPED | OUTPATIENT
Start: 2022-08-29 | End: 2022-09-22

## 2022-08-29 RX ORDER — DEXTROAMPHETAMINE SACCHARATE, AMPHETAMINE ASPARTATE, DEXTROAMPHETAMINE SULFATE AND AMPHETAMINE SULFATE 2.5; 2.5; 2.5; 2.5 MG/1; MG/1; MG/1; MG/1
10 TABLET ORAL DAILY
Qty: 30 TABLET | Refills: 0 | Status: SHIPPED | OUTPATIENT
Start: 2022-08-29 | End: 2022-09-22

## 2022-09-22 ENCOUNTER — MYC REFILL (OUTPATIENT)
Dept: FAMILY MEDICINE | Facility: CLINIC | Age: 22
End: 2022-09-22

## 2022-09-22 DIAGNOSIS — F90.2 ATTENTION DEFICIT HYPERACTIVITY DISORDER (ADHD), COMBINED TYPE: ICD-10-CM

## 2022-09-22 RX ORDER — DEXTROAMPHETAMINE SACCHARATE, AMPHETAMINE ASPARTATE MONOHYDRATE, DEXTROAMPHETAMINE SULFATE AND AMPHETAMINE SULFATE 5; 5; 5; 5 MG/1; MG/1; MG/1; MG/1
CAPSULE, EXTENDED RELEASE ORAL
Qty: 30 CAPSULE | Refills: 0 | Status: SHIPPED | OUTPATIENT
Start: 2022-09-22 | End: 2022-10-23

## 2022-09-22 RX ORDER — DEXTROAMPHETAMINE SACCHARATE, AMPHETAMINE ASPARTATE, DEXTROAMPHETAMINE SULFATE AND AMPHETAMINE SULFATE 2.5; 2.5; 2.5; 2.5 MG/1; MG/1; MG/1; MG/1
10 TABLET ORAL DAILY
Qty: 30 TABLET | Refills: 0 | Status: SHIPPED | OUTPATIENT
Start: 2022-09-22 | End: 2022-10-23

## 2022-10-23 ENCOUNTER — MYC REFILL (OUTPATIENT)
Dept: FAMILY MEDICINE | Facility: CLINIC | Age: 22
End: 2022-10-23

## 2022-10-23 DIAGNOSIS — F90.2 ATTENTION DEFICIT HYPERACTIVITY DISORDER (ADHD), COMBINED TYPE: ICD-10-CM

## 2022-10-24 RX ORDER — DEXTROAMPHETAMINE SACCHARATE, AMPHETAMINE ASPARTATE MONOHYDRATE, DEXTROAMPHETAMINE SULFATE AND AMPHETAMINE SULFATE 5; 5; 5; 5 MG/1; MG/1; MG/1; MG/1
CAPSULE, EXTENDED RELEASE ORAL
Qty: 30 CAPSULE | Refills: 0 | Status: SHIPPED | OUTPATIENT
Start: 2022-10-24 | End: 2022-10-25

## 2022-10-24 RX ORDER — DEXTROAMPHETAMINE SACCHARATE, AMPHETAMINE ASPARTATE, DEXTROAMPHETAMINE SULFATE AND AMPHETAMINE SULFATE 2.5; 2.5; 2.5; 2.5 MG/1; MG/1; MG/1; MG/1
10 TABLET ORAL DAILY
Qty: 30 TABLET | Refills: 0 | Status: SHIPPED | OUTPATIENT
Start: 2022-10-24 | End: 2022-11-23

## 2022-10-25 ENCOUNTER — TELEPHONE (OUTPATIENT)
Dept: FAMILY MEDICINE | Facility: CLINIC | Age: 22
End: 2022-10-25

## 2022-10-25 DIAGNOSIS — F90.2 ATTENTION DEFICIT HYPERACTIVITY DISORDER (ADHD), COMBINED TYPE: ICD-10-CM

## 2022-10-25 RX ORDER — DEXTROAMPHETAMINE SACCHARATE, AMPHETAMINE ASPARTATE MONOHYDRATE, DEXTROAMPHETAMINE SULFATE AND AMPHETAMINE SULFATE 5; 5; 5; 5 MG/1; MG/1; MG/1; MG/1
CAPSULE, EXTENDED RELEASE ORAL
Qty: 30 CAPSULE | Refills: 0 | Status: SHIPPED | OUTPATIENT
Start: 2022-10-25 | End: 2022-11-23

## 2022-10-25 NOTE — TELEPHONE ENCOUNTER
Patient requesting script sent to [facility] As they do have some in stock.    Thank you,    Freya Hogue, LYNN Cruz

## 2022-10-25 NOTE — TELEPHONE ENCOUNTER
Pt called informing that her Adderall XR, 20 mg, is part of nationwide shortage per her pharmacy.    Pt is checking with other pharmacies for supply.    She will let us know if she finds a pharmacy to send her current prescription to.    Audelia Song RN   [facility]

## 2022-10-30 ENCOUNTER — MYC MEDICAL ADVICE (OUTPATIENT)
Dept: GASTROENTEROLOGY | Facility: CLINIC | Age: 22
End: 2022-10-30

## 2022-10-31 ENCOUNTER — TELEPHONE (OUTPATIENT)
Dept: GASTROENTEROLOGY | Facility: CLINIC | Age: 22
End: 2022-10-31

## 2022-10-31 ENCOUNTER — MYC MEDICAL ADVICE (OUTPATIENT)
Dept: CALL CENTER | Age: 22
End: 2022-10-31

## 2022-10-31 NOTE — TELEPHONE ENCOUNTER
Screening Questions    BlueKIND OF PREP RedLOCATION [review exclusion criteria] GreenSEDATION TYPE    N Have you had a positive covid test in the last 90 days?   If yes, what date?      Y Your able to give consent for your medical care?    Y Are you active on mychart?     PO What insurance do you carry?      AMANDA SMITH Ordering/Referring Provider:     21.6 BMI [BMI OVER 40-EXTENDED PREP]  BMI OVER 40 NEED PAC EVALUATION FOR [facility]    Respiratory Screening  [If yes to any of the following HOSPITAL setting only]     N      Do you use daily home oxygen?   N      Do you have mod to severe Obstructive Sleep Apnea?  N      Do you have Pulmonary Hypertension? NEED PAC APPT AT [facility]    N      Do you have UNCONTROLLED asthma?      N Have you had a heart or lung transplant?       N Are you currently on dialysis? [If yes, G-PREP & HOSPITAL setting only]   N  Do you have chronic kidney disease? [If yes, G-PREP]  N  Do you have a diagnosis of diabetes?[If yes, G-PREP]    N Have you had a stroke or Transient ischemic attack (TIA - aka  mini stroke ) within 6 months? (If yes, please review exclusion criteria)  N  In the past 6 months, have you had any heart related issues including cardiomyopathy or heart attack?   N  If yes, did it require cardiac stenting or other implantable device?       N Do you have any implantable devices in your body (pacemaker, defib, LVAD)? (If yes, please review exclusion criteria)    N Do you take nitroglycerin?   N If yes, how often?  (If yes, HOSPITAL setting ONLY)  N Are you currently taking any blood thinners?           [IF YES, INFORM PATIENT TO FOLLOW UP W/ ORDERING PROVIDER FOR BRIDGING INSTRUCTIONS]     N  Do you take Phentermine?   Yes-> Hold for 7 days before procedure.  Please consult your prescribing provider if you have questions about holding this medication.     N    [FEMALES] are you currently pregnant?   N     If yes, how many weeks? [Greater than 12 weeks, OR NEEDED]    N Any prescription pain medications taken daily like narcotics? [EXTENDED PREP AND MAC]    N Any chemical dependencies such as alcohol, street drugs, or methadone? [If yes, MAC]    Y Any post-traumatic stress syndrome, severe anxiety or history of psychosis? [If yes, MAC]    Y Can you transfer from bed to chair? (If NO, please HOSPITAL setting  only)      N On a regular basis do you go 3-5 days without a bowel movement?[ If yes, EXTENDED PREP.]     Preferred LOCAL Pharmacy for Pre Prescription:           [id number] [place] [address]                          Scheduling Details    Girish Caller:   (Please ask for phone number if not scheduled by patient)    Type of Procedure Scheduled: Upper Endoscopy [EGD]     GPREP Which Colonoscopy Prep was Sent:   WILI CF PATIENTS & GROEN'S PATIENTS NEEDS EXTENDED PREP    1/10 Date of Procedure:   AMANDA SMITH Surgeon:    Location:     MAC Sedation Type:     Conscious Sedation- Needs  for 6 hours after the procedure  MAC/General-Needs  for 24 hours after procedure    Y Pre-op Required at [facility] [place] and OR for MAC sedation:        (Advise patient they will need a pre-op WITH IN 30 DAYS prior to procedure -)      Y Informed patient they will need an adult          Cannot take any type of public or medical transportation alone    Y Confirmed Nurse will call to complete assessment     HOME Pre-Procedure Covid test to be completed [[facility] PCR Testing Required]    DOUBLE CHECK BMI AND MATILDE       Additional comments:

## 2022-10-31 NOTE — TELEPHONE ENCOUNTER
Replied to MyChart that order/referral for colonoscopy is active and does not  until May 2023.     Guera العلي RN

## 2022-11-02 RX ORDER — BISACODYL 5 MG
TABLET, DELAYED RELEASE (ENTERIC COATED) ORAL
Qty: 4 TABLET | Refills: 0 | Status: SHIPPED | OUTPATIENT
Start: 2022-11-02 | End: 2023-01-03

## 2022-11-08 ENCOUNTER — ANESTHESIA EVENT (OUTPATIENT)
Dept: SURGERY | Facility: AMBULATORY SURGERY CENTER | Age: 22
End: 2022-11-08
Payer: COMMERCIAL

## 2022-11-09 ENCOUNTER — OFFICE VISIT (OUTPATIENT)
Dept: FAMILY MEDICINE | Facility: CLINIC | Age: 22
End: 2022-11-09
Payer: COMMERCIAL

## 2022-11-09 VITALS
RESPIRATION RATE: 16 BRPM | TEMPERATURE: 98.6 F | HEIGHT: 63 IN | DIASTOLIC BLOOD PRESSURE: 76 MMHG | OXYGEN SATURATION: 100 % | SYSTOLIC BLOOD PRESSURE: 104 MMHG | BODY MASS INDEX: 20.32 KG/M2 | WEIGHT: 114.7 LBS | HEART RATE: 107 BPM

## 2022-11-09 DIAGNOSIS — R10.84 ABDOMINAL PAIN, GENERALIZED: ICD-10-CM

## 2022-11-09 DIAGNOSIS — F90.2 ATTENTION DEFICIT HYPERACTIVITY DISORDER (ADHD), COMBINED TYPE: ICD-10-CM

## 2022-11-09 DIAGNOSIS — G43.109 MIGRAINE WITH AURA AND WITHOUT STATUS MIGRAINOSUS, NOT INTRACTABLE: ICD-10-CM

## 2022-11-09 DIAGNOSIS — F41.1 GENERALIZED ANXIETY DISORDER: ICD-10-CM

## 2022-11-09 DIAGNOSIS — F33.41 RECURRENT MAJOR DEPRESSIVE DISORDER, IN PARTIAL REMISSION (H): ICD-10-CM

## 2022-11-09 DIAGNOSIS — Z01.818 PREOP GENERAL PHYSICAL EXAM: Primary | ICD-10-CM

## 2022-11-09 PROCEDURE — 99213 OFFICE O/P EST LOW 20 MIN: CPT | Performed by: INTERNAL MEDICINE

## 2022-11-09 ASSESSMENT — PATIENT HEALTH QUESTIONNAIRE - PHQ9
10. IF YOU CHECKED OFF ANY PROBLEMS, HOW DIFFICULT HAVE THESE PROBLEMS MADE IT FOR YOU TO DO YOUR WORK, TAKE CARE OF THINGS AT HOME, OR GET ALONG WITH OTHER PEOPLE: SOMEWHAT DIFFICULT
SUM OF ALL RESPONSES TO PHQ QUESTIONS 1-9: 12
SUM OF ALL RESPONSES TO PHQ QUESTIONS 1-9: 12

## 2022-11-09 ASSESSMENT — PAIN SCALES - GENERAL: PAINLEVEL: NO PAIN (0)

## 2022-11-09 NOTE — PATIENT INSTRUCTIONS
Skip your medications the AM of surgery  No testing is needed        Preparing for Your Surgery  Getting started  A nurse will call you to review your health history and instructions. They will give you an arrival time based on your scheduled surgery time. Please be ready to share:  Your doctor s clinic name and phone number  Your medical, surgical, and anesthesia history  A list of allergies and sensitivities  A list of medicines, including herbal treatments and over-the-counter drugs  Whether the patient has a legal guardian (ask how to send us the papers in advance)  Please tell us if you re pregnant--or if there s any chance you might be pregnant. Some surgeries may injure a fetus (unborn baby), so they require a pregnancy test. Surgeries that are safe for a fetus don t always need a test, and you can choose whether to have one.   If you have a child who s having surgery, please ask for a copy of Preparing for Your Child s Surgery.    Preparing for surgery  Within 10 to 30 days of surgery: Have a pre-op exam (sometimes called an H&P, or History and Physical). This can be done at a clinic or pre-operative center.  If you re having a , you may not need this exam. Talk to your care team.  At your pre-op exam, talk to your care team about all medicines you take. If you need to stop any medicines before surgery, ask when to start taking them again.  We do this for your safety. Many medicines can make you bleed too much during surgery. Some change how well surgery (anesthesia) drugs work.  Call your insurance company to let them know you re having surgery. (If you don t have insurance, call [phone].)  Call your clinic if there s any change in your health. This includes signs of a cold or flu (sore throat, runny nose, cough, rash, fever). It also includes a scrape or scratch near the surgery site.  If you have questions on the day of surgery, call your hospital or surgery center.  COVID testing  You may  need to be tested for COVID-19 before having surgery. If so, we will give you instructions (or click here).  Eating and drinking guidelines  For your safety: Unless your surgeon tells you otherwise, follow the guidelines below.  Eat and drink as usual until 8 hours before you arrive for surgery. After that, no food or milk.  Drink clear liquids until 2 hours before you arrive. These are liquids you can see through, like water, Gatorade, and Propel Water. They also include plain black coffee and tea (no cream or milk), candy, and breath mints. You can spit out gum when you arrive.  If you drink alcohol: Stop drinking it the night before surgery.  If your care team tells you to take medicine on the morning of surgery, it s okay to take it with a sip of water.  Preventing infection  Shower or bathe the night before and morning of your surgery. Follow the instructions your clinic gave you. (If no instructions, use regular soap.)  Don t shave or clip hair near your surgery site. We ll remove the hair if needed.  Don t smoke or vape the morning of surgery. You may chew nicotine gum up to 2 hours before surgery. A nicotine patch is okay.  Note: Some surgeries require you to completely quit smoking and nicotine. Check with your surgeon.  Your care team will make every effort to keep you safe from infection. We will:  Clean our hands often with soap and water (or an alcohol-based hand rub).  Clean the skin at your surgery site with a special soap that kills germs.  Give you a special gown to keep you warm. (Cold raises the risk of infection.)  Wear special hair covers, masks, gowns and gloves during surgery.  Give antibiotic medicine, if prescribed. Not all surgeries need antibiotics.  What to bring on the day of surgery  Photo ID and insurance card  Copy of your health care directive, if you have one  Glasses and hearing aids (bring cases)  You can t wear contacts during surgery  Inhaler and eye drops, if you use them (tell  us about these when you arrive)  CPAP machine or breathing device, if you use them  A few personal items, if spending the night  If you have . . .  A pacemaker, ICD (cardiac defibrillator) or other implant: Bring the ID card.  An implanted stimulator: Bring the remote control.  A legal guardian: Bring a copy of the certified (court-stamped) guardianship papers.  Please remove any jewelry, including body piercings. Leave jewelry and other valuables at home.  If you re going home the day of surgery  You must have a responsible adult drive you home. They should stay with you overnight as well.  If you don t have someone to stay with you, and you aren t safe to go home alone, we may keep you overnight. Insurance often won t pay for this.  After surgery  If it s hard to control your pain or you need more pain medicine, please call your surgeon s office.  Questions?   If you have any questions for your care team, list them here:   ____________________________________________________________________________________________________________________________________________________________________________________________________________________________________________________________________  For informational purposes only. Not to replace the advice of your health care provider. Copyright   2003, 2019 [employer]. All rights reserved. Clinically reviewed by Kristina Eagle MD. Door 6 [id number] - REV 10/22.

## 2022-11-09 NOTE — ANESTHESIA PREPROCEDURE EVALUATION
Anesthesia Pre-Procedure Evaluation    Patient: Girish Calderón   MRN: [id number] : 2000        Procedure : Procedure(s):  COLONOSCOPY, WITH CO2 INSUFFLATION          History reviewed. No pertinent past medical history.   Past Surgical History:   Procedure Laterality Date     COMBINED ESOPHAGOSCOPY, GASTROSCOPY, DUODENOSCOPY (EGD) WITH CO2 INSUFFLATION N/A 3/18/2022    Procedure: ESOPHAGOGASTRODUODENOSCOPY, WITH CO2 INSUFFLATION;  Surgeon: Guera Willett DO;  Location: [facility]     MYRINGOTOMY, INSERT TUBE, COMBINED  2011    Procedure:COMBINED MYRINGOTOMY, INSERT TUBE; Surgeon:GERARDO MATUTE; Location:[facility]     MYRINGOTOMY, INSERT TUBE, COMBINED Left 9/10/2014    Procedure: COMBINED MYRINGOTOMY, INSERT TUBE;  Surgeon: Gerardo Matute MD;  Location: [facility]      Allergies   Allergen Reactions     Soap Rash     Patient states she has sensitive skin and gets rashes from scented soaps.       Social History     Tobacco Use     Smoking status: Passive Smoke Exposure - Never Smoker     Smokeless tobacco: Never     Tobacco comments:     father smokes outside   Substance Use Topics     Alcohol use: Yes     Comment: occ.      Wt Readings from Last 1 Encounters:   22 53.6 kg (118 lb 2 oz)           Physical Exam    Airway        Mallampati: I   TM distance: > 3 FB   Neck ROM: full   Mouth opening: > 3 cm    Respiratory Devices and Support         Dental  no notable dental history     (+) upper retainer and lower retainer      Cardiovascular   cardiovascular exam normal          Pulmonary   pulmonary exam normal                OUTSIDE LABS:  CBC:   Lab Results   Component Value Date    WBC 6.4 2021    WBC 7.2 2021    HGB 14.4 2021    HGB 13.8 2021    HCT 42.8 2021    HCT 41.1 2021     2021     2021     BMP:   Lab Results   Component Value Date     2021     2021    POTASSIUM 4.1 2021    POTASSIUM 3.8  02/12/2021    CHLORIDE 104 07/16/2021    CHLORIDE 108 02/12/2021    CO2 28 07/16/2021    CO2 27 02/12/2021    BUN 9 07/16/2021    BUN 6 (L) 02/12/2021    CR 0.70 07/16/2021    CR 0.76 02/12/2021    GLC 83 07/16/2021    GLC 81 02/12/2021     COAGS:   Lab Results   Component Value Date    PTT 36 05/13/2015    INR 1.04 05/13/2015     POC:   Lab Results   Component Value Date    HCG Negative 08/12/2022     HEPATIC:   Lab Results   Component Value Date    ALBUMIN 4.0 07/16/2021    PROTTOTAL 7.3 07/16/2021    ALT 18 07/16/2021    AST 24 07/16/2021    ALKPHOS 55 07/16/2021    BILITOTAL 0.7 07/16/2021     OTHER:   Lab Results   Component Value Date    DULCE 9.2 07/16/2021    TSH 2.44 10/28/2020    CRP <2.9 02/12/2021    SED 6 05/13/2015       Anesthesia Plan    ASA Status:  2   NPO Status:  NPO Appropriate    Anesthesia Type: MAC.     - Reason for MAC: straight local not clinically adequate   Induction: Intravenous, Propofol.   Maintenance: TIVA.        Consents    Anesthesia Plan(s) and associated risks, benefits, and realistic alternatives discussed. Questions answered and patient/representative(s) expressed understanding.    - Discussed:     - Discussed with:  Patient      - Extended Intubation/Ventilatory Support Discussed: No.      - Patient is DNR/DNI Status: No    Use of blood products discussed: No .     Postoperative Care       PONV prophylaxis: Ondansetron (or other 5HT-3), Background Propofol Infusion     Comments:                Mike Carrillo MD

## 2022-11-09 NOTE — H&P (VIEW-ONLY)
[facility]  [address] [address]  Phone: [phone]  Primary Provider: Kerline Tellez  Pre-op Performing Provider: MICK BENTON      PREOPERATIVE EVALUATION:  Today's date: 11/9/2022    Girish Calderón is a 22 year old female who presents for a preoperative evaluation.    Surgical Information:  Surgery/Procedure: COLONOSCOPY, WITH CO2 INSUFFLATION  Surgery Location: [facility]   Surgeon: Guera Willett DO  Surgery Date: 11/10/22  Time of Surgery: 1:50pm  Where patient plans to recover: At home with family  Fax number for surgical facility: Note does not need to be faxed, will be available electronically in Epic.    Type of Anesthesia Anticipated: MAC    Assessment & Plan     The proposed surgical procedure is considered LOW risk.    Problem List Items Addressed This Visit     Generalized anxiety disorder    Migraine with aura and without status migrainosus, not intractable    Recurrent major depressive disorder, in partial remission (H)   Other Visit Diagnoses     Preop general physical exam    -  Primary    Abdominal pain, generalized        Attention deficit hyperactivity disorder (ADHD), combined type                     Risks and Recommendations:  The patient has the following additional risks and recommendations for perioperative complications:   - No identified additional risk factors other than previously addressed    Medication Instructions:  pt should skip her regular mediations prior to procedure    RECOMMENDATION:  APPROVAL GIVEN to proceed with proposed procedure, without further diagnostic evaluation.                      Subjective       Chief Complaint   Patient presents with     Pre-Op Exam       HPI related to upcoming procedure: abdominal pain and lower GI bleeding needing further diagnostic testing      Preop Questions 11/9/2022   1. Have you ever had a heart attack or stroke? No   2. Have you ever had surgery on your heart  or blood vessels, such as a stent placement, a coronary artery bypass, or surgery on an artery in your head, neck, heart, or legs? No   3. Do you have chest pain with activity? No   4. Do you have a history of  heart failure? No   5. Do you currently have a cold, bronchitis or symptoms of other infection? No   6. Do you have a cough, shortness of breath, or wheezing? No   7. Do you or anyone in your family have previous history of blood clots? No   8. Do you or does anyone in your family have a serious bleeding problem such as prolonged bleeding following surgeries or cuts? No   9. Have you ever had problems with anemia or been told to take iron pills? No   10. Have you had any abnormal blood loss such as black, tarry or bloody stools, or abnormal vaginal bleeding? YES - Blood in stools; reason for c-scope   11. Have you ever had a blood transfusion? No   12. Are you willing to have a blood transfusion if it is medically needed before, during, or after your surgery? Yes   13. Have you or any of your relatives ever had problems with anesthesia? No   14. Do you have sleep apnea, excessive snoring or daytime drowsiness? No   15. Do you have any artifical heart valves or other implanted medical devices like a pacemaker, defibrillator, or continuous glucose monitor? No   16. Do you have artificial joints? No   17. Are you allergic to latex? No   18. Is there any chance that you may be pregnant? No     Health Care Directive:  Patient does not have a Health Care Directive or Living Will: Discussed advance care planning with patient; however, patient declined at this time.    Preoperative Review of :   reviewed - controlled substances reflected in medication list.      Status of Chronic Conditions:  DEPRESSION - Patient has a long history of Depression of moderate severity requiring medication for control with recent symptoms being stable..Current symptoms of depression include none.       Review of  Systems  CONSTITUTIONAL: NEGATIVE for fever, chills, change in weight  INTEGUMENTARY/SKIN: NEGATIVE for worrisome rashes, moles or lesions  EYES: NEGATIVE for vision changes or irritation  ENT/MOUTH: NEGATIVE for ear, mouth and throat problems  RESP: NEGATIVE for significant cough or SOB  CV: NEGATIVE for chest pain, palpitations or peripheral edema  GI: NEGATIVE for nausea, abdominal pain, heartburn, or change in bowel habits  : NEGATIVE for frequency, dysuria, or hematuria  MUSCULOSKELETAL: NEGATIVE for significant arthralgias or myalgia  NEURO: NEGATIVE for weakness, dizziness or paresthesias  ENDOCRINE: NEGATIVE for temperature intolerance, skin/hair changes  HEME: NEGATIVE for bleeding problems  PSYCHIATRIC: NEGATIVE for changes in mood or affect    Patient Active Problem List    Diagnosis Date Noted     Migraine with aura and without status migrainosus, not intractable 12/29/2016     Priority: Medium     Generalized anxiety disorder 01/15/2016     Priority: Medium     Recurrent major depressive disorder, in partial remission (H) 01/15/2016     Priority: Medium      No past medical history on file.  Past Surgical History:   Procedure Laterality Date     COMBINED ESOPHAGOSCOPY, GASTROSCOPY, DUODENOSCOPY (EGD) WITH CO2 INSUFFLATION N/A 3/18/2022    Procedure: ESOPHAGOGASTRODUODENOSCOPY, WITH CO2 INSUFFLATION;  Surgeon: Guera Willett DO;  Location:  OR     MYRINGOTOMY, INSERT TUBE, COMBINED  5/16/2011    Procedure:COMBINED MYRINGOTOMY, INSERT TUBE; Surgeon:GERARDO MATUTE; Location:[facility]     MYRINGOTOMY, INSERT TUBE, COMBINED Left 9/10/2014    Procedure: COMBINED MYRINGOTOMY, INSERT TUBE;  Surgeon: Gerardo Matute MD;  Location: [facility]     Current Outpatient Medications   Medication Sig Dispense Refill     amphetamine-dextroamphetamine (ADDERALL XR) 20 MG 24 hr capsule TAKE 1 CAPSULE BY MOUTH ONCE DAILY 30 capsule 0     amphetamine-dextroamphetamine (ADDERALL) 10 MG tablet Take 1 tablet (10 mg)  by mouth daily 30 tablet 0     bisacodyl (DULCOLAX) 5 MG EC tablet Take 2 tablets at 3 pm the day before your procedure. If your procedure is before 11 am, take 2 additional tablets at 11 pm. If your procedure is after 11 am, take 2 additional tablets at 6 am. For additional instructions refer to your colonoscopy prep instructions. 4 tablet 0     bisacodyl (DULCOLAX) 5 MG EC tablet Take 2 tablets at 3 pm the day before your procedure. If your procedure is before 11 am, take 2 additional tablets at 11 pm. If your procedure is after 11 am, take 2 additional tablets at 6 am. For additional instructions refer to your colonoscopy prep instructions. 4 tablet 0     FLUoxetine (PROZAC) 20 MG capsule Take 1 capsule (20 mg) by mouth daily 90 capsule 1     ketoconazole (NIZORAL) 2 % external shampoo Apply topically daily as needed for itching or irritation 120 mL 1     polyethylene glycol (GOLYTELY) 236 g suspension The night before the exam at 6 pm drink an 8-ounce glass every 15 minutes until the jug is half empty. If you arrive before 11 AM: Drink the other half of the Golytely jug at 11 PM night before procedure. If you arrive after 11 AM: Drink the other half of the Golytely jug at 6 AM day of procedure. For additional instructions refer to your colonoscopy prep instructions. 4000 mL 0     polyethylene glycol (GOLYTELY) 236 g suspension The night before the exam at 6 pm drink an 8-ounce glass every 15 minutes until the jug is half empty. If you arrive before 11 AM: Drink the other half of the Golytely jug at 11 PM night before procedure. If you arrive after 11 AM: Drink the other half of the Golytely jug at 6 AM day of procedure. For additional instructions refer to your colonoscopy prep instructions. 4000 mL 0     triamcinolone (KENALOG) 0.1 % external cream Apply topically 2 times daily 80 g 0     FLUoxetine (PROZAC) 10 MG capsule Take 1 capsule (10 mg) by mouth daily (Patient not taking: Reported on 11/9/2022) 90  "capsule 3       Allergies   Allergen Reactions     Soap Rash     Patient states she has sensitive skin and gets rashes from scented soaps.         Social History     Tobacco Use     Smoking status: Never     Passive exposure: Yes     Smokeless tobacco: Never     Tobacco comments:     father smokes outside   Substance Use Topics     Alcohol use: Yes     Comment: occ.     Family History   Problem Relation Age of Onset     Hypertension Mother      Lung Cancer Mother         approx age 45     Colon Cancer Mother      Other Cancer Mother         liver     Cancer - colorectal Maternal Grandmother      Breast Cancer Paternal Grandmother      Depression Paternal Grandmother      Prostate Cancer Paternal Grandfather      Heart Disease Paternal Grandfather      Depression Paternal Grandfather      Heart Disease Paternal Uncle 52        heart attack      Heart Disease Maternal Grandfather      Depression Father      Prostate Cancer Father      Anxiety Disorder Sister      C.A.D. No family hx of      Diabetes No family hx of      Cerebrovascular Disease No family hx of      History   Drug Use No         Objective     /76 (BP Location: Right arm, Patient Position: Sitting, Cuff Size: Adult Regular)   Pulse 107   Temp 98.6  F (37  C) (Tympanic)   Resp 16   Ht 1.588 m (5' 2.52\")   Wt 52 kg (114 lb 11.2 oz)   LMP 11/03/2022 (Approximate)   SpO2 100%   BMI 20.63 kg/m      Physical Exam  GENERAL APPEARANCE: healthy, alert and no distress  HENT: ear canals and TM's normal and nose and mouth without ulcers or lesions  RESP: lungs clear to auscultation - no rales, rhonchi or wheezes  CV: regular rate and rhythm, normal S1 S2, no S3 or S4 and no murmur, click or rub   ABDOMEN: soft, nontender, no HSM or masses and bowel sounds normal  NEURO: Normal strength and tone, sensory exam grossly normal, mentation intact and speech normal    Recent Labs   Lab Test 07/16/21  1333 07/16/21  1332 02/12/21  1426   HGB 14.4  --  13.8 "     --  286   NA  --  135 141   POTASSIUM  --  4.1 3.8   CR  --  0.70 0.76        Diagnostics:  No labs were ordered during this visit.   No EKG required, no history of coronary heart disease, significant arrhythmia, peripheral arterial disease or other structural heart disease.    Revised Cardiac Risk Index (RCRI):  The patient has the following serious cardiovascular risks for perioperative complications:   - No serious cardiac risks = 0 points     RCRI Interpretation: 0 points: Class I (very low risk - 0.4% complication rate)           Signed Electronically by: Citlalli Mendoza DO  Copy of this evaluation report is provided to requesting physician.

## 2022-11-10 ENCOUNTER — ANESTHESIA (OUTPATIENT)
Dept: SURGERY | Facility: AMBULATORY SURGERY CENTER | Age: 22
End: 2022-11-10
Payer: COMMERCIAL

## 2022-11-10 ENCOUNTER — TELEPHONE (OUTPATIENT)
Dept: GASTROENTEROLOGY | Facility: CLINIC | Age: 22
End: 2022-11-10

## 2022-11-10 ENCOUNTER — HOSPITAL ENCOUNTER (OUTPATIENT)
Facility: AMBULATORY SURGERY CENTER | Age: 22
Discharge: HOME | End: 2022-11-10
Attending: INTERNAL MEDICINE
Payer: COMMERCIAL

## 2022-11-10 VITALS
HEART RATE: 81 BPM | SYSTOLIC BLOOD PRESSURE: 136 MMHG | OXYGEN SATURATION: 100 % | DIASTOLIC BLOOD PRESSURE: 77 MMHG | TEMPERATURE: 98.2 F | RESPIRATION RATE: 16 BRPM

## 2022-11-10 VITALS — HEART RATE: 80 BPM

## 2022-11-10 DIAGNOSIS — Z12.11 SCREEN FOR COLON CANCER: Primary | ICD-10-CM

## 2022-11-10 LAB — COLONOSCOPY: NORMAL

## 2022-11-10 PROCEDURE — 45380 COLONOSCOPY AND BIOPSY: CPT

## 2022-11-10 PROCEDURE — 88305 TISSUE EXAM BY PATHOLOGIST: CPT | Performed by: PATHOLOGY

## 2022-11-10 PROCEDURE — G8907 PT DOC NO EVENTS ON DISCHARG: HCPCS

## 2022-11-10 PROCEDURE — G8918 PT W/O PREOP ORDER IV AB PRO: HCPCS

## 2022-11-10 RX ORDER — NALOXONE HYDROCHLORIDE 0.4 MG/ML
0.2 INJECTION, SOLUTION INTRAMUSCULAR; INTRAVENOUS; SUBCUTANEOUS
Status: DISCONTINUED | OUTPATIENT
Start: 2022-11-10 | End: 2022-11-11 | Stop reason: HOSPADM

## 2022-11-10 RX ORDER — ONDANSETRON 4 MG/1
4 TABLET, ORALLY DISINTEGRATING ORAL EVERY 30 MIN PRN
Status: DISCONTINUED | OUTPATIENT
Start: 2022-11-10 | End: 2022-11-11 | Stop reason: HOSPADM

## 2022-11-10 RX ORDER — SODIUM CHLORIDE, SODIUM LACTATE, POTASSIUM CHLORIDE, CALCIUM CHLORIDE 600; 310; 30; 20 MG/100ML; MG/100ML; MG/100ML; MG/100ML
INJECTION, SOLUTION INTRAVENOUS CONTINUOUS
Status: DISCONTINUED | OUTPATIENT
Start: 2022-11-10 | End: 2022-11-11 | Stop reason: HOSPADM

## 2022-11-10 RX ORDER — ONDANSETRON 4 MG/1
4 TABLET, ORALLY DISINTEGRATING ORAL EVERY 6 HOURS PRN
Status: DISCONTINUED | OUTPATIENT
Start: 2022-11-10 | End: 2022-11-11 | Stop reason: HOSPADM

## 2022-11-10 RX ORDER — PROPOFOL 10 MG/ML
INJECTION, EMULSION INTRAVENOUS PRN
Status: DISCONTINUED | OUTPATIENT
Start: 2022-11-10 | End: 2022-11-10

## 2022-11-10 RX ORDER — LIDOCAINE HYDROCHLORIDE 20 MG/ML
INJECTION, SOLUTION INFILTRATION; PERINEURAL PRN
Status: DISCONTINUED | OUTPATIENT
Start: 2022-11-10 | End: 2022-11-10

## 2022-11-10 RX ORDER — ONDANSETRON 2 MG/ML
4 INJECTION INTRAMUSCULAR; INTRAVENOUS EVERY 6 HOURS PRN
Status: DISCONTINUED | OUTPATIENT
Start: 2022-11-10 | End: 2022-11-11 | Stop reason: HOSPADM

## 2022-11-10 RX ORDER — METOPROLOL TARTRATE 1 MG/ML
1-2 INJECTION, SOLUTION INTRAVENOUS EVERY 5 MIN PRN
Status: DISCONTINUED | OUTPATIENT
Start: 2022-11-10 | End: 2022-11-11 | Stop reason: HOSPADM

## 2022-11-10 RX ORDER — ONDANSETRON 2 MG/ML
4 INJECTION INTRAMUSCULAR; INTRAVENOUS EVERY 30 MIN PRN
Status: DISCONTINUED | OUTPATIENT
Start: 2022-11-10 | End: 2022-11-11 | Stop reason: HOSPADM

## 2022-11-10 RX ORDER — PROPOFOL 10 MG/ML
INJECTION, EMULSION INTRAVENOUS CONTINUOUS PRN
Status: DISCONTINUED | OUTPATIENT
Start: 2022-11-10 | End: 2022-11-10

## 2022-11-10 RX ORDER — LIDOCAINE 40 MG/G
CREAM TOPICAL
Status: DISCONTINUED | OUTPATIENT
Start: 2022-11-10 | End: 2022-11-11 | Stop reason: HOSPADM

## 2022-11-10 RX ORDER — ONDANSETRON 2 MG/ML
4 INJECTION INTRAMUSCULAR; INTRAVENOUS
Status: DISCONTINUED | OUTPATIENT
Start: 2022-11-10 | End: 2022-11-11 | Stop reason: HOSPADM

## 2022-11-10 RX ORDER — PROCHLORPERAZINE MALEATE 10 MG
10 TABLET ORAL EVERY 6 HOURS PRN
Status: DISCONTINUED | OUTPATIENT
Start: 2022-11-10 | End: 2022-11-11 | Stop reason: HOSPADM

## 2022-11-10 RX ORDER — FLUMAZENIL 0.1 MG/ML
0.2 INJECTION, SOLUTION INTRAVENOUS
Status: DISCONTINUED | OUTPATIENT
Start: 2022-11-10 | End: 2022-11-11 | Stop reason: HOSPADM

## 2022-11-10 RX ORDER — NALOXONE HYDROCHLORIDE 0.4 MG/ML
0.4 INJECTION, SOLUTION INTRAMUSCULAR; INTRAVENOUS; SUBCUTANEOUS
Status: DISCONTINUED | OUTPATIENT
Start: 2022-11-10 | End: 2022-11-11 | Stop reason: HOSPADM

## 2022-11-10 RX ADMIN — LIDOCAINE HYDROCHLORIDE 40 MG: 20 INJECTION, SOLUTION INFILTRATION; PERINEURAL at 13:21

## 2022-11-10 RX ADMIN — PROPOFOL 150 MCG/KG/MIN: 10 INJECTION, EMULSION INTRAVENOUS at 13:21

## 2022-11-10 RX ADMIN — PROPOFOL 100 MG: 10 INJECTION, EMULSION INTRAVENOUS at 13:21

## 2022-11-10 RX ADMIN — SODIUM CHLORIDE, SODIUM LACTATE, POTASSIUM CHLORIDE, CALCIUM CHLORIDE: 600; 310; 30; 20 INJECTION, SOLUTION INTRAVENOUS at 13:11

## 2022-11-10 RX ADMIN — PROPOFOL 50 MG: 10 INJECTION, EMULSION INTRAVENOUS at 13:23

## 2022-11-10 NOTE — ANESTHESIA CARE TRANSFER NOTE
Patient: Girish Calderón    Procedure: Procedure(s):  COLONOSCOPY, WITH CO2 INSUFFLATION  COLONOSCOPY, WITH POLYPECTOMY AND BIOPSY       Diagnosis: Bloating [R14.0]  Diagnosis Additional Information: No value filed.    Anesthesia Type:   MAC     Note:    Oropharynx: oropharynx clear of all foreign objects and spontaneously breathing  Level of Consciousness: drowsy  Oxygen Supplementation: room air    Independent Airway: airway patency satisfactory and stable    Vital Signs Stable: post-procedure vital signs reviewed and stable  Report to RN Given: handoff report given  Patient transferred to: Phase II    Handoff Report: Identifed the Patient, Identified the Reponsible Provider, Reviewed the pertinent medical history, Discussed the surgical course, Reviewed Intra-OP anesthesia mangement and issues during anesthesia, Set expectations for post-procedure period and Allowed opportunity for questions and acknowledgement of understanding      Vitals:  Vitals Value Taken Time   BP     Temp     Pulse     Resp     SpO2         Electronically Signed By: THERESA Magana CRNA  November 10, 2022  1:52 PM

## 2022-11-10 NOTE — TELEPHONE ENCOUNTER
11/10 Provided phone number [phone] to schedule follow up with Dr. Mancuso, aisha pagan or keyla pearce in the next few weeks.     [facility] Procedure   Orthopedics, Podiatry, Sports Medicine, Ent ,Eye , Audiology, Adult Endocrine & Diabetes, Nutrition & Medication Therapy Management Specialties   [facility]         ----- Message from Guerda Jones RN sent at 11/10/2022  1:55 PM CST -----  Regarding: Schedule appt  can you help schedule this patient with either Dr. Mancuso. Aisha Pagan or Keyla Pearce in the next few weeks?   Thanks,  Guerda Jones RN

## 2022-11-10 NOTE — ANESTHESIA POSTPROCEDURE EVALUATION
Patient: Girish Calderón    Procedure: Procedure(s):  COLONOSCOPY, WITH CO2 INSUFFLATION  COLONOSCOPY, WITH POLYPECTOMY AND BIOPSY       Anesthesia Type:  MAC    Note:  Disposition: Outpatient   Postop Pain Control: Uneventful            Sign Out: Well controlled pain   PONV:    Neuro/Psych: Uneventful            Sign Out: Acceptable/Baseline neuro status   Airway/Respiratory: Uneventful            Sign Out: Acceptable/Baseline resp. status   CV/Hemodynamics: Uneventful            Sign Out: Acceptable CV status; No obvious hypovolemia; No obvious fluid overload   Other NRE:    DID A NON-ROUTINE EVENT OCCUR?            Last vitals:  Vitals Value Taken Time   /61 11/10/22 1351   Temp 97.6  F (36.4  C) 11/10/22 1351   Pulse 81 11/10/22 1351   Resp 16 11/10/22 1351   SpO2 99 % 11/10/22 1351       Electronically Signed By: Mike Carrillo MD  November 10, 2022  2:00 PM

## 2022-11-14 LAB
PATH REPORT.COMMENTS IMP SPEC: NORMAL
PATH REPORT.COMMENTS IMP SPEC: NORMAL
PATH REPORT.FINAL DX SPEC: NORMAL
PATH REPORT.GROSS SPEC: NORMAL
PATH REPORT.MICROSCOPIC SPEC OTHER STN: NORMAL
PATH REPORT.RELEVANT HX SPEC: NORMAL
PHOTO IMAGE: NORMAL

## 2022-11-15 NOTE — TELEPHONE ENCOUNTER
LPN left message for patient requesting a return call to schedule follow up with Ben Pérez PA-C or Singh Pagan PA-C per Dr. Willett's message below.     CLEMENTE Hernandez Kristin, DO P [facility]  He -   Can you help Girish schedule a follow-up appointment sometime in the next few weeks - she's fine to see Singh or Ben.   Thanks!   Guera

## 2022-11-16 NOTE — TELEPHONE ENCOUNTER
11/16 2nd attempt. Mailbox is full.     [facility] Procedure   Orthopedics, Podiatry, Sports Medicine, Ent ,Eye , Audiology, Adult Endocrine & Diabetes, Nutrition & Medication Therapy Management Specialties   [facility]

## 2022-11-21 ENCOUNTER — E-VISIT (OUTPATIENT)
Dept: FAMILY MEDICINE | Facility: CLINIC | Age: 22
End: 2022-11-21
Payer: COMMERCIAL

## 2022-11-21 DIAGNOSIS — F41.9 ANXIETY: Primary | ICD-10-CM

## 2022-11-21 PROCEDURE — 99422 OL DIG E/M SVC 11-20 MIN: CPT | Performed by: FAMILY MEDICINE

## 2022-11-22 NOTE — TELEPHONE ENCOUNTER
Patient has not returned call to schedule follow up. LPN sent patient a [employer] message with scheduling number to call.     Anastasia Dempsey LPN

## 2022-11-23 ENCOUNTER — MYC REFILL (OUTPATIENT)
Dept: FAMILY MEDICINE | Facility: CLINIC | Age: 22
End: 2022-11-23

## 2022-11-23 DIAGNOSIS — F90.2 ATTENTION DEFICIT HYPERACTIVITY DISORDER (ADHD), COMBINED TYPE: ICD-10-CM

## 2022-11-23 RX ORDER — FLUOXETINE 40 MG/1
40 CAPSULE ORAL DAILY
Qty: 90 CAPSULE | Refills: 1 | Status: SHIPPED | OUTPATIENT
Start: 2022-11-23 | End: 2023-04-19

## 2022-11-23 RX ORDER — DEXTROAMPHETAMINE SACCHARATE, AMPHETAMINE ASPARTATE MONOHYDRATE, DEXTROAMPHETAMINE SULFATE AND AMPHETAMINE SULFATE 5; 5; 5; 5 MG/1; MG/1; MG/1; MG/1
CAPSULE, EXTENDED RELEASE ORAL
Qty: 30 CAPSULE | Refills: 0 | Status: SHIPPED | OUTPATIENT
Start: 2022-11-23 | End: 2022-11-28

## 2022-11-23 RX ORDER — DEXTROAMPHETAMINE SACCHARATE, AMPHETAMINE ASPARTATE, DEXTROAMPHETAMINE SULFATE AND AMPHETAMINE SULFATE 2.5; 2.5; 2.5; 2.5 MG/1; MG/1; MG/1; MG/1
10 TABLET ORAL DAILY
Qty: 30 TABLET | Refills: 0 | Status: SHIPPED | OUTPATIENT
Start: 2022-11-23 | End: 2023-01-04

## 2022-11-23 NOTE — TELEPHONE ENCOUNTER
Patient requesting refills to [facility]. Also would like to increase prozac as discussed.    Thank you,    Freya Hogue, YLNN Cruz

## 2022-11-28 DIAGNOSIS — F90.2 ATTENTION DEFICIT HYPERACTIVITY DISORDER (ADHD), COMBINED TYPE: ICD-10-CM

## 2022-11-28 RX ORDER — DEXTROAMPHETAMINE SACCHARATE, AMPHETAMINE ASPARTATE MONOHYDRATE, DEXTROAMPHETAMINE SULFATE AND AMPHETAMINE SULFATE 5; 5; 5; 5 MG/1; MG/1; MG/1; MG/1
CAPSULE, EXTENDED RELEASE ORAL
Qty: 30 CAPSULE | Refills: 0 | Status: SHIPPED | OUTPATIENT
Start: 2022-11-28 | End: 2023-01-04

## 2022-11-28 NOTE — TELEPHONE ENCOUNTER
Patient called back asking if her Adderall has been sent to her pharmacy.  Order is pending,Dr Tellez is seeing patients and will review when able between patients.  Ashleigh Foy RN

## 2022-11-28 NOTE — TELEPHONE ENCOUNTER
[facility] out [facility] it in stock, please re-send order.    Thank you,    Freya Hogue, LYNN Cruz

## 2022-12-05 NOTE — TELEPHONE ENCOUNTER
Patient has read the [employer] message, no further follow up needed, closing encounter.    Guerda Jones RN

## 2022-12-22 ENCOUNTER — HOSPITAL ENCOUNTER (EMERGENCY)
Facility: CLINIC | Age: 22
Discharge: HOME | End: 2022-12-22
Attending: EMERGENCY MEDICINE | Admitting: EMERGENCY MEDICINE
Payer: COMMERCIAL

## 2022-12-22 ENCOUNTER — HOSPITAL ENCOUNTER (EMERGENCY)
Facility: CLINIC | Age: 22
Discharge: HOME | End: 2022-12-22
Attending: EMERGENCY MEDICINE
Payer: COMMERCIAL

## 2022-12-22 VITALS
TEMPERATURE: 100 F | DIASTOLIC BLOOD PRESSURE: 86 MMHG | RESPIRATION RATE: 16 BRPM | HEART RATE: 120 BPM | OXYGEN SATURATION: 100 % | HEIGHT: 62 IN | SYSTOLIC BLOOD PRESSURE: 144 MMHG | BODY MASS INDEX: 21.4 KG/M2

## 2022-12-22 VITALS
SYSTOLIC BLOOD PRESSURE: 143 MMHG | RESPIRATION RATE: 18 BRPM | WEIGHT: 117 LBS | HEART RATE: 120 BPM | OXYGEN SATURATION: 100 % | DIASTOLIC BLOOD PRESSURE: 93 MMHG | HEIGHT: 62 IN | TEMPERATURE: 97.8 F | BODY MASS INDEX: 21.53 KG/M2

## 2022-12-22 DIAGNOSIS — R46.89 CHANGE IN BEHAVIOR: ICD-10-CM

## 2022-12-22 DIAGNOSIS — R46.89 BEHAVIORAL CHANGE: ICD-10-CM

## 2022-12-22 LAB
ALBUMIN SERPL BCG-MCNC: 4.6 G/DL (ref 3.5–5.2)
ALBUMIN UR-MCNC: NEGATIVE MG/DL
ALP SERPL-CCNC: 60 U/L (ref 35–104)
ALT SERPL W P-5'-P-CCNC: 15 U/L (ref 10–35)
AMORPH CRY #/AREA URNS HPF: ABNORMAL /HPF
AMPHETAMINES UR QL SCN: ABNORMAL
ANION GAP SERPL CALCULATED.3IONS-SCNC: 9 MMOL/L (ref 7–15)
APPEARANCE UR: CLEAR
AST SERPL W P-5'-P-CCNC: 18 U/L (ref 10–35)
BARBITURATES UR QL SCN: ABNORMAL
BASOPHILS # BLD AUTO: 0.1 10E3/UL (ref 0–0.2)
BASOPHILS NFR BLD AUTO: 1 %
BENZODIAZ UR QL SCN: ABNORMAL
BILIRUB SERPL-MCNC: 0.4 MG/DL
BILIRUB UR QL STRIP: NEGATIVE
BUN SERPL-MCNC: 9.1 MG/DL (ref 6–20)
BZE UR QL SCN: ABNORMAL
CALCIUM SERPL-MCNC: 9.5 MG/DL (ref 8.6–10)
CANNABINOIDS UR QL SCN: ABNORMAL
CHLORIDE SERPL-SCNC: 101 MMOL/L (ref 98–107)
COLOR UR AUTO: YELLOW
CREAT SERPL-MCNC: 0.78 MG/DL (ref 0.51–0.95)
DEPRECATED HCO3 PLAS-SCNC: 28 MMOL/L (ref 22–29)
EOSINOPHIL # BLD AUTO: 0 10E3/UL (ref 0–0.7)
EOSINOPHIL NFR BLD AUTO: 0 %
ERYTHROCYTE [DISTWIDTH] IN BLOOD BY AUTOMATED COUNT: 11.9 % (ref 10–15)
GFR SERPL CREATININE-BSD FRML MDRD: >90 ML/MIN/1.73M2
GLUCOSE SERPL-MCNC: 107 MG/DL (ref 70–99)
GLUCOSE UR STRIP-MCNC: NEGATIVE MG/DL
HCG UR QL: NEGATIVE
HCT VFR BLD AUTO: 42.4 % (ref 35–47)
HGB BLD-MCNC: 14.6 G/DL (ref 11.7–15.7)
HGB UR QL STRIP: ABNORMAL
HOLD SPECIMEN: NORMAL
HOLD SPECIMEN: NORMAL
IMM GRANULOCYTES # BLD: 0 10E3/UL
IMM GRANULOCYTES NFR BLD: 1 %
KETONES UR STRIP-MCNC: NEGATIVE MG/DL
LEUKOCYTE ESTERASE UR QL STRIP: NEGATIVE
LYMPHOCYTES # BLD AUTO: 1.4 10E3/UL (ref 0.8–5.3)
LYMPHOCYTES NFR BLD AUTO: 19 %
MCH RBC QN AUTO: 30.9 PG (ref 26.5–33)
MCHC RBC AUTO-ENTMCNC: 34.4 G/DL (ref 31.5–36.5)
MCV RBC AUTO: 90 FL (ref 78–100)
MONOCYTES # BLD AUTO: 0.5 10E3/UL (ref 0–1.3)
MONOCYTES NFR BLD AUTO: 7 %
NEUTROPHILS # BLD AUTO: 5.3 10E3/UL (ref 1.6–8.3)
NEUTROPHILS NFR BLD AUTO: 72 %
NITRATE UR QL: NEGATIVE
NRBC # BLD AUTO: 0 10E3/UL
NRBC BLD AUTO-RTO: 0 /100
OPIATES UR QL SCN: ABNORMAL
PCP QUAL URINE (ROCHE): ABNORMAL
PH UR STRIP: 7.5 [PH] (ref 5–7)
PLATELET # BLD AUTO: 358 10E3/UL (ref 150–450)
POTASSIUM SERPL-SCNC: 3.5 MMOL/L (ref 3.4–5.3)
PROT SERPL-MCNC: 7.3 G/DL (ref 6.4–8.3)
RBC # BLD AUTO: 4.73 10E6/UL (ref 3.8–5.2)
RBC URINE: 1 /HPF
SODIUM SERPL-SCNC: 138 MMOL/L (ref 136–145)
SP GR UR STRIP: 1.01 (ref 1–1.03)
SQUAMOUS EPITHELIAL: 3 /HPF
TSH SERPL DL<=0.005 MIU/L-ACNC: 2.47 UIU/ML (ref 0.3–4.2)
UROBILINOGEN UR STRIP-MCNC: NORMAL MG/DL
WBC # BLD AUTO: 7.2 10E3/UL (ref 4–11)
WBC URINE: 5 /HPF

## 2022-12-22 PROCEDURE — 90791 PSYCH DIAGNOSTIC EVALUATION: CPT

## 2022-12-22 PROCEDURE — 80307 DRUG TEST PRSMV CHEM ANLYZR: CPT | Performed by: EMERGENCY MEDICINE

## 2022-12-22 PROCEDURE — 36415 COLL VENOUS BLD VENIPUNCTURE: CPT | Performed by: EMERGENCY MEDICINE

## 2022-12-22 PROCEDURE — 99285 EMERGENCY DEPT VISIT HI MDM: CPT | Mod: 25 | Performed by: EMERGENCY MEDICINE

## 2022-12-22 PROCEDURE — 81001 URINALYSIS AUTO W/SCOPE: CPT | Mod: XU | Performed by: EMERGENCY MEDICINE

## 2022-12-22 PROCEDURE — 84443 ASSAY THYROID STIM HORMONE: CPT | Performed by: EMERGENCY MEDICINE

## 2022-12-22 PROCEDURE — 99285 EMERGENCY DEPT VISIT HI MDM: CPT | Performed by: EMERGENCY MEDICINE

## 2022-12-22 PROCEDURE — 85025 COMPLETE CBC W/AUTO DIFF WBC: CPT | Performed by: EMERGENCY MEDICINE

## 2022-12-22 PROCEDURE — 81025 URINE PREGNANCY TEST: CPT | Performed by: EMERGENCY MEDICINE

## 2022-12-22 PROCEDURE — 80053 COMPREHEN METABOLIC PANEL: CPT | Performed by: EMERGENCY MEDICINE

## 2022-12-22 ASSESSMENT — COLUMBIA-SUICIDE SEVERITY RATING SCALE - C-SSRS
ATTEMPT LIFETIME: NO
1. HAVE YOU WISHED YOU WERE DEAD OR WISHED YOU COULD GO TO SLEEP AND NOT WAKE UP?: NO
6. HAVE YOU EVER DONE ANYTHING, STARTED TO DO ANYTHING, OR PREPARED TO DO ANYTHING TO END YOUR LIFE?: NO
2. HAVE YOU ACTUALLY HAD ANY THOUGHTS OF KILLING YOURSELF?: NO
TOTAL  NUMBER OF ABORTED OR SELF INTERRUPTED ATTEMPTS LIFETIME: NO
TOTAL  NUMBER OF INTERRUPTED ATTEMPTS LIFETIME: NO

## 2022-12-22 ASSESSMENT — ACTIVITIES OF DAILY LIVING (ADL)
ADLS_ACUITY_SCORE: 35

## 2022-12-22 ASSESSMENT — ENCOUNTER SYMPTOMS
RESPIRATORY NEGATIVE: 1
GASTROINTESTINAL NEGATIVE: 1
MUSCULOSKELETAL NEGATIVE: 1
CARDIOVASCULAR NEGATIVE: 1
ALLERGIC/IMMUNOLOGIC NEGATIVE: 1
EYES NEGATIVE: 1
ENDOCRINE NEGATIVE: 1
NEUROLOGICAL NEGATIVE: 1

## 2022-12-22 NOTE — ED TRIAGE NOTES
Brought in by boyfriend for changes in behavior and mood over past week.  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;ost car keys spent large sums of money denies drug use is restless does not know day year or president     She states she is here because he is making her     Her only complaint is a headache       Triage Assessment     Row Name 12/22/22 1511       Triage Assessment (Adult)    Airway WDL WDL       Respiratory WDL    Respiratory WDL WDL       Skin Circulation/Temperature WDL    Skin Circulation/Temperature WDL WDL       Cardiac WDL    Cardiac WDL WDL       Peripheral/Neurovascular WDL    Peripheral Neurovascular WDL WDL    Capillary Refill, General less than/equal to 3 secs       Cognitive/Neuro/Behavioral WDL    Cognitive/Neuro/Behavioral WDL mood/behavior;orientation    Orientation person;place;situation    Mood/Behavior hyperactive (agitated, impulsive);restless       [place] Coma Scale    Best Eye Response 4-->(E4) spontaneous    Best Motor Response 6-->(M6) obeys commands    Best Verbal Response 4-->(V4) confused    [place] Coma Scale Score 14

## 2022-12-22 NOTE — ED PROVIDER NOTES
History     Chief Complaint   Patient presents with     Psychiatric Evaluation     Reports sudden change in behavior/thoughts per SO. Eloped from ED previously.      HPI  Girish Calderón is a 22 year old female who we know from the emergency department after initially presenting with her partner concern about change in her behavior.  The healthcare team called police who helped place the patient a health officer hold and brought him to the emergency department for further care.    On examination patient reports her significant other is concerned about her behavior.  She reports he has no reasons to be concerned.  She tells me she is followed by [facility] and that she had a phone visit earlier in the week.  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  She is currently on fluoxetine 40 mg daily, Adderall and smokes marijuana.  Additional history was obtained in discussion with her significant other Jude Stover and his mother Mercedes stover.  Jude reports that over the last 2 weeks his girlfriend has been acting bizarrely.  She is typically not a Buddhist person but has been making bizarre statements.  She also went shopping at [employer] today and brought home for $1100 worth of items.  Mercedes reports that the patient is typically tedious and very organized.  She works out routinely and is very caring of her cats.  The last 2 weeks she has not worked out and has not fed her cats.  Mercedes also reports that she has not taken her Adderall for the last 2 weeks.  Patient initially had arrived in the ED with her boyfriend after you convince her to come in to be evaluated but then eloped and left him in the emergency department.  Police and EMS were summoned to her home where she was ultimately encouraged to come in for further care.  Patient reports no prior hospitalization for mental health reasons.  When asked about why she put in a 2-week  notice for her work she reports that her job is stressful.  Her boyfriend reports that the job is not stressful.    Allergies:  Allergies   Allergen Reactions     Soap Rash     Patient states she has sensitive skin and gets rashes from scented soaps.        Problem List:    Patient Active Problem List    Diagnosis Date Noted     Migraine with aura and without status migrainosus, not intractable 12/29/2016     Priority: Medium     Generalized anxiety disorder 01/15/2016     Priority: Medium     Recurrent major depressive disorder, in partial remission (H) 01/15/2016     Priority: Medium        Past Medical History:    No past medical history on file.    Past Surgical History:    Past Surgical History:   Procedure Laterality Date     COLONOSCOPY N/A 11/10/2022    Procedure: COLONOSCOPY, WITH POLYPECTOMY AND BIOPSY;  Surgeon: Guera iWllett DO;  Location: [facility]     COLONOSCOPY WITH CO2 INSUFFLATION N/A 11/10/2022    Procedure: COLONOSCOPY, WITH CO2 INSUFFLATION;  Surgeon: Guera Willett DO;  Location: [facility]     COMBINED ESOPHAGOSCOPY, GASTROSCOPY, DUODENOSCOPY (EGD) WITH CO2 INSUFFLATION N/A 3/18/2022    Procedure: ESOPHAGOGASTRODUODENOSCOPY, WITH CO2 INSUFFLATION;  Surgeon: Guera Willett DO;  Location: [facility]     MYRINGOTOMY, INSERT TUBE, COMBINED  5/16/2011    Procedure:COMBINED MYRINGOTOMY, INSERT TUBE; Surgeon:GERARDO MATUTE; Location:[facility]     MYRINGOTOMY, INSERT TUBE, COMBINED Left 9/10/2014    Procedure: COMBINED MYRINGOTOMY, INSERT TUBE;  Surgeon: Gerardo Matute MD;  Location: [facility]       Family History:    Family History   Problem Relation Age of Onset     Hypertension Mother      Lung Cancer Mother         approx age 45     Colon Cancer Mother      Other Cancer Mother         liver     Cancer - colorectal Maternal Grandmother      Breast Cancer Paternal Grandmother      Depression Paternal Grandmother      Prostate Cancer Paternal Grandfather      Heart Disease Paternal Grandfather   "    Depression Paternal Grandfather      Heart Disease Paternal Uncle 52        heart attack      Heart Disease Maternal Grandfather      Depression Father      Prostate Cancer Father      Anxiety Disorder Sister      C.A.D. No family hx of      Diabetes No family hx of      Cerebrovascular Disease No family hx of        Social History:  Marital Status:  Single [1]  Social History     Tobacco Use     Smoking status: Never     Passive exposure: Yes     Smokeless tobacco: Never     Tobacco comments:     father smokes outside   Vaping Use     Vaping Use: Former   Substance Use Topics     Alcohol use: Yes     Comment: occ.     Drug use: No        Medications:    amphetamine-dextroamphetamine (ADDERALL XR) 20 MG 24 hr capsule  amphetamine-dextroamphetamine (ADDERALL) 10 MG tablet  FLUoxetine (PROZAC) 40 MG capsule  ketoconazole (NIZORAL) 2 % external shampoo  triamcinolone (KENALOG) 0.1 % external cream  bisacodyl (DULCOLAX) 5 MG EC tablet  bisacodyl (DULCOLAX) 5 MG EC tablet  FLUoxetine (PROZAC) 20 MG capsule  polyethylene glycol (GOLYTELY) 236 g suspension  polyethylene glycol (GOLYTELY) 236 g suspension          Review of Systems   Constitutional:        Sudden change in behavior with bizarre statements over the last 2 weeks.   HENT: Negative.    Eyes: Negative.    Respiratory: Negative.    Cardiovascular: Negative.    Gastrointestinal: Negative.    Endocrine: Negative.    Genitourinary: Negative.    Musculoskeletal: Negative.    Skin: Negative.    Allergic/Immunologic: Negative.    Neurological: Negative.    Psychiatric/Behavioral: Positive for behavioral problems.   All other systems reviewed and are negative.      Physical Exam   BP: (!) 144/86  Pulse: 120  Temp: 100  F (37.8  C)  Resp: 16  Height: 157.5 cm (5' 2\")  SpO2: 100 %      Physical Exam  Constitutional:       General: She is not in acute distress.     Appearance: Normal appearance. She is not ill-appearing, toxic-appearing or diaphoretic.   HENT:      " "Head: Normocephalic and atraumatic.      Nose: Nose normal.   Eyes:      Extraocular Movements: Extraocular movements intact.      Pupils: Pupils are equal, round, and reactive to light.   Cardiovascular:      Pulses: Normal pulses.   Pulmonary:      Effort: Pulmonary effort is normal.   Musculoskeletal:         General: No swelling, tenderness, deformity or signs of injury.      Cervical back: Normal range of motion and neck supple.      Right lower leg: No edema.      Left lower leg: No edema.   Skin:     Capillary Refill: Capillary refill takes less than 2 seconds.      Coloration: Skin is not jaundiced or pale.      Findings: No bruising, erythema, lesion or rash.   Neurological:      General: No focal deficit present.      Mental Status: She is alert and oriented to person, place, and time.   Psychiatric:         Mood and Affect: Mood is elated.         Speech: Speech is tangential.         Thought Content: Thought content does not include homicidal or suicidal plan.         Judgment: Judgment is impulsive.         ED Course                 Procedures              Critical Care time:  none               ED medications: none      ED Vitals:  Vitals:    12/22/22 1706   BP: (!) 144/86   Pulse: 120   Resp: 16   Temp: 100  F (37.8  C)   TempSrc: Oral   SpO2: 100%   Height: 1.575 m (5' 2\")     ED Labs and imaging:  Results for orders placed or performed during the hospital encounter of 12/22/22   Comprehensive metabolic panel     Status: Abnormal   Result Value Ref Range    Sodium 138 136 - 145 mmol/L    Potassium 3.5 3.4 - 5.3 mmol/L    Chloride 101 98 - 107 mmol/L    Carbon Dioxide (CO2) 28 22 - 29 mmol/L    Anion Gap 9 7 - 15 mmol/L    Urea Nitrogen 9.1 6.0 - 20.0 mg/dL    Creatinine 0.78 0.51 - 0.95 mg/dL    Calcium 9.5 8.6 - 10.0 mg/dL    Glucose 107 (H) 70 - 99 mg/dL    Alkaline Phosphatase 60 35 - 104 U/L    AST 18 10 - 35 U/L    ALT 15 10 - 35 U/L    Protein Total 7.3 6.4 - 8.3 g/dL    Albumin 4.6 3.5 - 5.2 " g/dL    Bilirubin Total 0.4 <=1.2 mg/dL    GFR Estimate >90 >60 mL/min/1.73m2   TSH with free T4 reflex     Status: Normal   Result Value Ref Range    TSH 2.47 0.30 - 4.20 uIU/mL   HCG qualitative urine     Status: Normal   Result Value Ref Range    hCG Urine Qualitative Negative Negative   UA with Microscopic reflex to Culture     Status: Abnormal    Specimen: Urine, Midstream   Result Value Ref Range    Color Urine Yellow Colorless, Straw, Light Yellow, Yellow    Appearance Urine Clear Clear    Glucose Urine Negative Negative mg/dL    Bilirubin Urine Negative Negative    Ketones Urine Negative Negative mg/dL    Specific Gravity Urine 1.015 1.003 - 1.035    Blood Urine Moderate (A) Negative    pH Urine 7.5 (H) 5.0 - 7.0    Protein Albumin Urine Negative Negative mg/dL    Urobilinogen Urine Normal Normal, 2.0 mg/dL    Nitrite Urine Negative Negative    Leukocyte Esterase Urine Negative Negative    Amorphous Crystals Urine Few (A) None Seen /HPF    RBC Urine 1 <=2 /HPF    WBC Urine 5 <=5 /HPF    Squamous Epithelials Urine 3 (H) <=1 /HPF    Narrative    Urine Culture not indicated   [place] Draw     Status: None    Narrative    The following orders were created for panel order [place] Draw.  Procedure                               Abnormality         Status                     ---------                               -----------         ------                     Extra Blue Top Tube[[id number]]                              Final result               Extra Red Top Tube[[id number]]                               Final result                 Please view results for these tests on the individual orders.   CBC with platelets and differential     Status: None   Result Value Ref Range    WBC Count 7.2 4.0 - 11.0 10e3/uL    RBC Count 4.73 3.80 - 5.20 10e6/uL    Hemoglobin 14.6 11.7 - 15.7 g/dL    Hematocrit 42.4 35.0 - 47.0 %    MCV 90 78 - 100 fL    MCH 30.9 26.5 - 33.0 pg    MCHC 34.4 31.5 - 36.5 g/dL    RDW 11.9 10.0 - 15.0 %     Platelet Count 358 150 - 450 10e3/uL    % Neutrophils 72 %    % Lymphocytes 19 %    % Monocytes 7 %    % Eosinophils 0 %    % Basophils 1 %    % Immature Granulocytes 1 %    NRBCs per 100 WBC 0 <1 /100    Absolute Neutrophils 5.3 1.6 - 8.3 10e3/uL    Absolute Lymphocytes 1.4 0.8 - 5.3 10e3/uL    Absolute Monocytes 0.5 0.0 - 1.3 10e3/uL    Absolute Eosinophils 0.0 0.0 - 0.7 10e3/uL    Absolute Basophils 0.1 0.0 - 0.2 10e3/uL    Absolute Immature Granulocytes 0.0 <=0.4 10e3/uL    Absolute NRBCs 0.0 10e3/uL   Extra Blue Top Tube     Status: None   Result Value Ref Range    Hold Specimen [facility]    Extra Red Top Tube     Status: None   Result Value Ref Range    Hold Specimen [facility]    Drug abuse screen 77 urine (FL, RH, SH)     Status: Abnormal   Result Value Ref Range    Amphetamines Urine Screen Positive (A) Screen Negative    Barbituates Urine Screen Negative Screen Negative    Benzodiazepine Urine Screen Negative Screen Negative    Cannabinoids Urine Screen Positive (A) Screen Negative    Opiates Urine Screen Negative Screen Negative    PCP Urine Screen Negative Screen Negative    Cocaine Urine Screen Negative Screen Negative   CBC with platelets differential     Status: None    Narrative    The following orders were created for panel order CBC with platelets differential.  Procedure                               Abnormality         Status                     ---------                               -----------         ------                     CBC with platelets and d...[[id number]]                      Final result                 Please view results for these tests on the individual orders.   Urine Drugs of Abuse Screen     Status: Abnormal    Narrative    The following orders were created for panel order Urine Drugs of Abuse Screen.  Procedure                               Abnormality         Status                     ---------                               -----------         ------                     Drug abuse  screen 77 uri...[[id number]]  Abnormal            Final result                 Please view results for these tests on the individual orders.       Assessments & Plan (with Medical Decision Making)   Assessment Summary and Clinical Impression: 22-year-old female presenting for evaluation with concern about change in behavior over the last 2 weeks.  Patient has a history of anxiety and depression migraine headaches.  She had initially arrived for assessment and then eloped.  Police and EMS were summoned to her home ultimately helped bring the patient back to the department to be evaluated.  She arrived with an elated mood, she was somewhat tangential and impulsive in her judgment.  In discussion with her boyfriend and his mother there is concern that she may have some manic behavior.  No previous hospitalization for mental health reasons.  Patient's appears not to have any insight into her behavior although.  Symptoms are concerning for acute eugenie-need for inpatient care with partner and family expressing concern about her safety at home although she has no homicidal or suicidal ideations.  After discussion with the [facility] consultant on-call and completion of a DEC assessment plan is to discharge the patient home with her boyfriend with plan to follow-up in the morning with the P group and her treating therapist through [facility] with an appointment scheduled for outpatient follow-up.  Low threshold to return for evaluation.    ED course and Plan:  Reviewed the medical record.  After discussion with the patient, her boyfriend and his mother is concerned that patient has eugenie.  She would benefit from further inpatient care.  Spoke with DESIREE Messina- [facility] provider on-call at 7.10pm .  We discussed options for care as patient did not appear to have insight and appears to have acute eugenie. We reviewed the current health officer hold and options for further care as patient does not want to be admitted to the  hospital and family expressing a willingness to have expedited outpatient diagnostic evaluation.  I reviewed with the patient's boyfriend by phone Jude Stover [phone]-about options for care including placed on a 72-hour hold where she would likely board in the ED and so she can be eval by psychiatry versus discharging her home in his custody and trying to secure outpatient follow-up for the patient.  Patient would prefer to go home.  We discussed care measures to help ensure her safety given no homicidal or suicidal ideation.  Urine drug screen was positive for cannabis which patient reports she smokes and offending she is on Adderall.    Spoke with WALI Messina- [facility] consultant who was willing to try to schedule outpatient follow-up for the patient and have the team touch base with patient in the morning.  Patient was also encouraged to reach out to her current therapist through [facility].    Disclaimer: This note consists of symbols derived from keyboarding, dictation and/or voice recognition software. As a result, there may be errors in the script that have gone undetected. Please consider this when interpreting information found in this chart.  I have reviewed the nursing notes.    I have reviewed the findings, diagnosis, plan and need for follow up with the patient.       New Prescriptions    No medications on file       Final diagnoses:   Behavioral change - over the last 2 weeks       12/22/2022   [facility] EMERGENCY DEPT     Isauro Ferro MD  12/22/22 2100

## 2022-12-22 NOTE — ED NOTES
"Called pt in lobby to bring back to room boyfriend stated \"she stepped out for some air\" he went out to get her came back and said she had taken the car and left him here.     Boyfriend called 911 PD arrived and spoke with him then left.  His mom is coming to get him and they are going to look for pt.  Encouraged him to call 911 if he finds her and needs assistance getting her back to the hospital     "

## 2022-12-22 NOTE — ED TRIAGE NOTES
"Boyfriend brought patient here initially because per patient she \"blacked out\" from PTSD.  Patient left ER with boyfriend and his mom because patient states she's \"fine\", but was encouraged by police and brought back to the ER.  Per patient she has PTSD from her mom passing away in October 2017.  Pt states she also put her two weeks notice to quit her job.  She see's a therapist weekly.     Triage Assessment     Row Name 12/22/22 9249       Triage Assessment (Adult)    Airway WDL WDL       Respiratory WDL    Respiratory WDL WDL       Skin Circulation/Temperature WDL    Skin Circulation/Temperature WDL WDL       Cardiac WDL    Cardiac WDL X;rhythm    Cardiac Rhythm ST       Peripheral/Neurovascular WDL    Peripheral Neurovascular WDL WDL       Cognitive/Neuro/Behavioral WDL    Cognitive/Neuro/Behavioral WDL WDL       Melanie Coma Scale    Best Eye Response 4-->(E4) spontaneous    Best Motor Response 6-->(M6) obeys commands    Best Verbal Response 5-->(V5) oriented    [place] Coma Scale Score 15              "

## 2022-12-22 NOTE — ED PROVIDER NOTES
"  History     Chief Complaint   Patient presents with     Behavioral Problem     Change in behavior and attitude \"odd behavior\"     HPI  Girish Calderón is a 22 year old female who presents for evaluation with concern about change in behavior and attitude by her partner.    Patient's medical records show prior diagnosis of generalized anxiety disorder, recurrent depression and migraine headaches.    Patient initially eloped from the emergency department and return after she had been tracked down.       Allergies:  Allergies   Allergen Reactions     Soap Rash     Patient states she has sensitive skin and gets rashes from scented soaps.        Problem List:    Patient Active Problem List    Diagnosis Date Noted     Migraine with aura and without status migrainosus, not intractable 12/29/2016     Priority: Medium     Generalized anxiety disorder 01/15/2016     Priority: Medium     Recurrent major depressive disorder, in partial remission (H) 01/15/2016     Priority: Medium        Past Medical History:    No past medical history on file.    Past Surgical History:    Past Surgical History:   Procedure Laterality Date     COLONOSCOPY N/A 11/10/2022    Procedure: COLONOSCOPY, WITH POLYPECTOMY AND BIOPSY;  Surgeon: Guera Willett DO;  Location: [facility]     COLONOSCOPY WITH CO2 INSUFFLATION N/A 11/10/2022    Procedure: COLONOSCOPY, WITH CO2 INSUFFLATION;  Surgeon: Guera Willett DO;  Location: [facility]     COMBINED ESOPHAGOSCOPY, GASTROSCOPY, DUODENOSCOPY (EGD) WITH CO2 INSUFFLATION N/A 3/18/2022    Procedure: ESOPHAGOGASTRODUODENOSCOPY, WITH CO2 INSUFFLATION;  Surgeon: Guera Willett DO;  Location: [facility]     MYRINGOTOMY, INSERT TUBE, COMBINED  5/16/2011    Procedure:COMBINED MYRINGOTOMY, INSERT TUBE; Surgeon:GERARDO MATUTE; Location:[facility]     MYRINGOTOMY, INSERT TUBE, COMBINED Left 9/10/2014    Procedure: COMBINED MYRINGOTOMY, INSERT TUBE;  Surgeon: Gerardo Matute MD;  Location: [facility] OR       Family " "History:    Family History   Problem Relation Age of Onset     Hypertension Mother      Lung Cancer Mother         approx age 45     Colon Cancer Mother      Other Cancer Mother         liver     Cancer - colorectal Maternal Grandmother      Breast Cancer Paternal Grandmother      Depression Paternal Grandmother      Prostate Cancer Paternal Grandfather      Heart Disease Paternal Grandfather      Depression Paternal Grandfather      Heart Disease Paternal Uncle 52        heart attack      Heart Disease Maternal Grandfather      Depression Father      Prostate Cancer Father      Anxiety Disorder Sister      C.A.D. No family hx of      Diabetes No family hx of      Cerebrovascular Disease No family hx of        Social History:  Marital Status:  Single [1]  Social History     Tobacco Use     Smoking status: Never     Passive exposure: Yes     Smokeless tobacco: Never     Tobacco comments:     father smokes outside   Vaping Use     Vaping Use: Former   Substance Use Topics     Alcohol use: Yes     Comment: occ.     Drug use: No        Medications:    amphetamine-dextroamphetamine (ADDERALL XR) 20 MG 24 hr capsule  amphetamine-dextroamphetamine (ADDERALL) 10 MG tablet  bisacodyl (DULCOLAX) 5 MG EC tablet  bisacodyl (DULCOLAX) 5 MG EC tablet  FLUoxetine (PROZAC) 20 MG capsule  FLUoxetine (PROZAC) 40 MG capsule  ketoconazole (NIZORAL) 2 % external shampoo  polyethylene glycol (GOLYTELY) 236 g suspension  polyethylene glycol (GOLYTELY) 236 g suspension  triamcinolone (KENALOG) 0.1 % external cream          Review of Systems    Physical Exam   BP: (!) 143/93  Pulse: 120  Temp: 97.8  F (36.6  C)  Resp: 18  Height: 157.5 cm (5' 2\")  Weight: 53.1 kg (117 lb)  SpO2: 100 %      Physical Exam    ED Course                 Procedures              Critical Care time:  none               ED medications:      ED Vitals:  Vitals:    12/22/22 1507   BP: (!) 143/93   Pulse: 120   Resp: 18   Temp: 97.8  F (36.6  C)   TempSrc: Oral   SpO2: " "100%   Weight: 53.1 kg (117 lb)   Height: 1.575 m (5' 2\")     ED labs and imaging:      Assessments & Plan (with Medical Decision Making)   Assessment Summary and Clinical Impression: 22-year-old female who was brought in by her partner for further assessment with concern about behavioral changes.  Patient has a history of generalized anxiety disorder and recurrent depression migraine headaches.  Patient was not seen or examined she had eloped from the emergency department.      ED course and Plan:  Reviewed the medical record.  Patient was not seen or examined.  She eloped from the  emergency department.    Disclaimer: This note consists of symbols derived from keyboarding, dictation and/or voice recognition software. As a result, there may be errors in the script that have gone undetected. Please consider this when interpreting information found in this chart.  I have reviewed the nursing notes.    I have reviewed the findings, diagnosis, plan and need for follow up with the patient.       New Prescriptions    No medications on file       Final diagnoses:   None       12/22/2022   [facility] EMERGENCY DEPT     Isauro Ferro MD  12/22/22 1704    "

## 2022-12-23 NOTE — DISCHARGE INSTRUCTIONS
1) Your evaluation today is concerning for symptoms suggestive of eugenie and possible bipolar disorder.  We discussed the need for further evaluation assessment by mental health professional including psychiatry.  You have elected to go home and not seek further care.  After discussion with your family members you have agreed to go home with plan to be with family so they can monitor your behavior and actions.  We discussed the importance of establishing follow-up care for additional assessments.    2) Although he appears stable for discharge to home with your family at this time.  If your symptoms worsen or there are new concerns you should return to be reevaluated
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
## 2023-01-02 ENCOUNTER — TELEPHONE (OUTPATIENT)
Dept: FAMILY MEDICINE | Facility: CLINIC | Age: 23
End: 2023-01-02

## 2023-01-02 NOTE — TELEPHONE ENCOUNTER
Pt called back and was given Dr Tellez' message. Pt will keep her appt with [facility] and the clinic. Aliyah Garland RN
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
## 2023-01-02 NOTE — TELEPHONE ENCOUNTER
Thank you for the message.  I was able to review through her hospitalization and it appears as though the psychiatrist at the hospital recommend that she stop both her Adderall as well as the Prozac as they felt as though this was worsening her symptoms.    I am looking forward to seeing her on Wednesday morning.  Please make sure that she knows that she still needs to be seen by Rohan tomorrow as it is possible that I will not be able to prescribe the correct type of medication to help her appropriately.    Again, I am looking forward to seeing her on Wednesday morning.    Thanks    EB

## 2023-01-02 NOTE — TELEPHONE ENCOUNTER
Call placed to Patient to relay Dr Tellez message  No answer  Left message and call back number for Patient to return call  Chalino Ann RN

## 2023-01-03 NOTE — TELEPHONE ENCOUNTER
Call placed to Patient   No answer  Left message with call back number for Patient to return call  Chalino Ann RN

## 2023-01-04 ENCOUNTER — VIRTUAL VISIT (OUTPATIENT)
Dept: FAMILY MEDICINE | Facility: CLINIC | Age: 23
End: 2023-01-04
Payer: COMMERCIAL

## 2023-01-04 DIAGNOSIS — F41.1 GENERALIZED ANXIETY DISORDER: ICD-10-CM

## 2023-01-04 DIAGNOSIS — F90.2 ATTENTION DEFICIT HYPERACTIVITY DISORDER (ADHD), COMBINED TYPE: ICD-10-CM

## 2023-01-04 DIAGNOSIS — F33.41 RECURRENT MAJOR DEPRESSIVE DISORDER, IN PARTIAL REMISSION (H): Primary | ICD-10-CM

## 2023-01-04 PROCEDURE — 99215 OFFICE O/P EST HI 40 MIN: CPT | Mod: 95 | Performed by: FAMILY MEDICINE

## 2023-01-04 RX ORDER — DEXTROAMPHETAMINE SACCHARATE, AMPHETAMINE ASPARTATE MONOHYDRATE, DEXTROAMPHETAMINE SULFATE AND AMPHETAMINE SULFATE 5; 5; 5; 5 MG/1; MG/1; MG/1; MG/1
CAPSULE, EXTENDED RELEASE ORAL
Qty: 30 CAPSULE | Refills: 0 | Status: SHIPPED | OUTPATIENT
Start: 2023-01-04 | End: 2023-02-09

## 2023-01-04 RX ORDER — DEXTROAMPHETAMINE SACCHARATE, AMPHETAMINE ASPARTATE, DEXTROAMPHETAMINE SULFATE AND AMPHETAMINE SULFATE 2.5; 2.5; 2.5; 2.5 MG/1; MG/1; MG/1; MG/1
10 TABLET ORAL DAILY
Qty: 30 TABLET | Refills: 0 | Status: SHIPPED | OUTPATIENT
Start: 2023-01-04 | End: 2023-02-09
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
## 2023-01-25 ENCOUNTER — TELEPHONE (OUTPATIENT)
Dept: FAMILY MEDICINE | Facility: CLINIC | Age: 23
End: 2023-01-25
Payer: COMMERCIAL
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
## 2023-02-01 ENCOUNTER — TELEPHONE (OUTPATIENT)
Dept: FAMILY MEDICINE | Facility: CLINIC | Age: 23
End: 2023-02-01

## 2023-02-01 DIAGNOSIS — F33.41 RECURRENT MAJOR DEPRESSIVE DISORDER, IN PARTIAL REMISSION (H): Primary | ICD-10-CM

## 2023-02-01 NOTE — TELEPHONE ENCOUNTER
Patient states she has been taking 20mg of fluoxetine as she had some left over. She requests an order for 20mg to [facility] please.    Thank you,    Freya Hogue, JOSÉ MIGUEL Cruz

## 2023-02-01 NOTE — TELEPHONE ENCOUNTER
I have been waiting for this patient psychologist to get back to me after a release of information was signed by the patient - maybe patient can facilitate that?  I spoke with psychology after our last appointment but she was unable to give me any information given she did not have a release of information    .  The psychologist had mentioned that she is going to have Girish be seen by a psychiatrist.  I would be happy to speak with a psychologist regarding this medication but I am not sure that I feel comfortable prescribing it until I get further information from them.    Thanks    EB

## 2023-02-02 NOTE — TELEPHONE ENCOUNTER
Call placed to patient   Relayed Dr. Tellez message    Patient states she has not seen psychology   Reports she has been seeing her Therapist   Advised by therapist to re-start Fluoxetine and contact PCP for refills     Patient restarted the Fluoxetine 20mg 2 weeks ago   Has been doing well on that dose and denies side effects    Norberto Espinal RN

## 2023-02-02 NOTE — TELEPHONE ENCOUNTER
I will send a one month supply - please have her therapist reach out to me ASAP as well as I would like to talk with her therapist once she is able to discuss with me (make sure she has a release of info)    Please make sure that patient keeps her appt with me on the 15th as well    thanks    EB

## 2023-02-02 NOTE — TELEPHONE ENCOUNTER
Call placed to patient   No answer; generic voicemail left requesting call back to Clinic RN at [phone]    Norberto Espinal RN

## 2023-02-03 NOTE — TELEPHONE ENCOUNTER
Call placed to patient   No answer; generic voicemail left requesting call back to Clinic RN at [phone]     Norberto Espinal RN

## 2023-02-03 NOTE — TELEPHONE ENCOUNTER
Patient returned call   Relayed Dr. Tellez message    Patient states she had already received Dr. Tellez message   No further questions/concerns    Norberto Espinal RN

## 2023-02-09 ENCOUNTER — MYC REFILL (OUTPATIENT)
Dept: FAMILY MEDICINE | Facility: CLINIC | Age: 23
End: 2023-02-09
Payer: COMMERCIAL

## 2023-02-09 DIAGNOSIS — F90.2 ATTENTION DEFICIT HYPERACTIVITY DISORDER (ADHD), COMBINED TYPE: ICD-10-CM

## 2023-02-09 RX ORDER — DEXTROAMPHETAMINE SACCHARATE, AMPHETAMINE ASPARTATE MONOHYDRATE, DEXTROAMPHETAMINE SULFATE AND AMPHETAMINE SULFATE 5; 5; 5; 5 MG/1; MG/1; MG/1; MG/1
CAPSULE, EXTENDED RELEASE ORAL
Qty: 30 CAPSULE | Refills: 0 | Status: SHIPPED | OUTPATIENT
Start: 2023-02-09 | End: 2023-03-07

## 2023-02-09 RX ORDER — DEXTROAMPHETAMINE SACCHARATE, AMPHETAMINE ASPARTATE, DEXTROAMPHETAMINE SULFATE AND AMPHETAMINE SULFATE 2.5; 2.5; 2.5; 2.5 MG/1; MG/1; MG/1; MG/1
10 TABLET ORAL DAILY
Qty: 30 TABLET | Refills: 0 | Status: SHIPPED | OUTPATIENT
Start: 2023-02-09 | End: 2023-03-07

## 2023-02-09 NOTE — TELEPHONE ENCOUNTER
Routing refill request to provider for review/approval because:  Drug not on the [facility] refill protocol   Ashleigh Foy RN          comfortable appearance

## 2023-02-14 PROBLEM — F12.922: Status: ACTIVE | Noted: 2022-12-24

## 2023-02-14 PROBLEM — F31.10 BIPOLAR I DISORDER WITH MANIA (H): Status: ACTIVE | Noted: 2022-12-27

## 2023-02-22 ENCOUNTER — VIRTUAL VISIT (OUTPATIENT)
Dept: FAMILY MEDICINE | Facility: CLINIC | Age: 23
End: 2023-02-22
Payer: COMMERCIAL

## 2023-02-22 DIAGNOSIS — F33.41 RECURRENT MAJOR DEPRESSIVE DISORDER, IN PARTIAL REMISSION (H): Primary | ICD-10-CM

## 2023-02-22 DIAGNOSIS — F90.2 ATTENTION DEFICIT HYPERACTIVITY DISORDER (ADHD), COMBINED TYPE: ICD-10-CM

## 2023-02-22 DIAGNOSIS — L30.9 DERMATITIS: ICD-10-CM

## 2023-02-22 PROCEDURE — 99214 OFFICE O/P EST MOD 30 MIN: CPT | Mod: VID | Performed by: FAMILY MEDICINE

## 2023-02-22 RX ORDER — KETOCONAZOLE 20 MG/ML
SHAMPOO TOPICAL DAILY PRN
Qty: 120 ML | Refills: 1 | Status: SHIPPED | OUTPATIENT
Start: 2023-02-22 | End: 2024-05-21

## 2023-02-22 ASSESSMENT — PATIENT HEALTH QUESTIONNAIRE - PHQ9: SUM OF ALL RESPONSES TO PHQ QUESTIONS 1-9: 15

## 2023-02-22 NOTE — PROGRESS NOTES
Girish is a 22 year old who is being evaluated via a billable video visit.      How would you like to obtain your AVS? MyChart  If the video visit is dropped, the invitation should be resent by: Text to cell phone: [phone]  Will anyone else be joining your video visit? No      -------------------------    Assessment/Plan:    Girish Calderón is a 22 year old female presenting for:    Recurrent major depressive disorder, in partial remission (H)  I discussed with the patient that it would be my medical preference to have her meet with a psychiatrist to talk with them about her bipolar.  Patient states that she does not feel as though she is would be able to do that at this point given medical bills.  She states that she is working closely with her psychologist (weekly) and will let her know that they are increasing the medication.  She states that she also has a good support system and will let them know that medications are being increased and to watch for any erratic behaviors.  She will MyChart message me in 2 to 4 weeks to let me know how she is doing    Attention deficit hyperactivity disorder (ADHD), combined type  Patient does not need refills of medications at this time.  This is stable.    Dermatitis  Refill ketoconazole sent to the pharmacy  - ketoconazole (NIZORAL) 2 % external shampoo  Dispense: 120 mL; Refill: 1    I personally spent over 32 minutes performing care related to this patient on the day of the patient visit.  This includes chart review, precharting, talking with/ examining the patient as well as completing after visit documentation.        Medications Discontinued During This Encounter   Medication Reason     FLUoxetine (PROZAC) 20 MG capsule      ketoconazole (NIZORAL) 2 % external shampoo Reorder (No AVS / No eCancel)           Chief Complaint:  Recheck Medication          Subjective:   Girish Calderón is a pleasant 22 year old female being evaluated via video visit today for the  "following concern/s:     Depression/anxiety: Patient has a history of depression and anxiety.  She was previously on Prozac 20 mg in the up to 40 mg daily.  She was seen in the hospital for concerns over manic episode of bipolar disorder.    Please see previous note for further details.  This was a very traumatic experience for her.  She feels as though she was having trouble.  She states that she does not believe she has bipolar disorder.  Several of the things that they used to site the diagnosis were not accurate.  For example, they stated that she quit her job unexpectedly however she states that she had been planning on quitting for several months.    I have had an opportunity to speak with her psychologist who agrees with her story.  She states that the patient was attempting to improve her self-care and quit her job due to a toxic working environment.  This had been \"in the works\" for several months.  Her psychologist does not feel as though the patient has bipolar disorder.    Patient had leftover Prozac at home she started herself back on the 20 mg tablet.  She states that she is doing well with that and wants to increase to 40.  She has adequate supply at home but wanted to talk with me about this first.  She states that she is feeling a bit better and working hard with her psychologist regarding some PTSD from the episode in the hospital.    Additionally, she history of ADHD.  Currently taking Adderall and doing well with this medication.  She had self resume this medication after her hospitalization but is not been having any issues.      12 point review of systems completed and negative except for what has been described above    History   Smoking Status     Never   Smokeless Tobacco     Never         Current Outpatient Medications:      amphetamine-dextroamphetamine (ADDERALL XR) 20 MG 24 hr capsule, TAKE 1 CAPSULE BY MOUTH ONCE DAILY, Disp: 30 capsule, Rfl: 0     amphetamine-dextroamphetamine " "(ADDERALL) 10 MG tablet, Take 1 tablet (10 mg) by mouth daily, Disp: 30 tablet, Rfl: 0     ketoconazole (NIZORAL) 2 % external shampoo, Apply topically daily as needed for itching or irritation, Disp: 120 mL, Rfl: 1     triamcinolone (KENALOG) 0.1 % external cream, Apply topically 2 times daily, Disp: 80 g, Rfl: 0     FLUoxetine (PROZAC) 40 MG capsule, Take 1 capsule (40 mg) by mouth daily (Patient not taking: Reported on 2/22/2023), Disp: 90 capsule, Rfl: 1        Objective:  No vitals were done due to the nature of this visit  Vitals - Patient Reported 5/18/2022   Height (Patient Reported) 5' 2\"   Weight (Patient Reported) 123 lb   BMI (Based on Pt Reported Ht/Wt) 22.5 kg/m2               General: No acute distress  Psych: Appropriate affect  HEENT: moist mucous membranes  Pulmonary: Breathing comfortably, speaking in complete sentences  Extremities: warm and well perfused with no edema  Skin: warm and dry with no rash         This note has been dictated and transcribed using voice recognition software.   Any errors in transcription are unintentional and inherent to the software.      Video-Visit Details    Type of service:  Video Visit     Originating Location (pt. Location): Home    Distant Location (provider location):  On-site  Platform used for Video Visit: [employer]    "

## 2023-02-23 PROBLEM — F31.10 BIPOLAR I DISORDER WITH MANIA (H): Status: RESOLVED | Noted: 2022-12-27 | Resolved: 2023-02-23

## 2023-03-07 ENCOUNTER — MYC REFILL (OUTPATIENT)
Dept: FAMILY MEDICINE | Facility: CLINIC | Age: 23
End: 2023-03-07
Payer: COMMERCIAL

## 2023-03-07 DIAGNOSIS — F90.2 ATTENTION DEFICIT HYPERACTIVITY DISORDER (ADHD), COMBINED TYPE: ICD-10-CM

## 2023-03-09 RX ORDER — DEXTROAMPHETAMINE SACCHARATE, AMPHETAMINE ASPARTATE, DEXTROAMPHETAMINE SULFATE AND AMPHETAMINE SULFATE 2.5; 2.5; 2.5; 2.5 MG/1; MG/1; MG/1; MG/1
10 TABLET ORAL DAILY
Qty: 30 TABLET | Refills: 0 | Status: SHIPPED | OUTPATIENT
Start: 2023-03-09 | End: 2023-04-10

## 2023-03-09 RX ORDER — DEXTROAMPHETAMINE SACCHARATE, AMPHETAMINE ASPARTATE MONOHYDRATE, DEXTROAMPHETAMINE SULFATE AND AMPHETAMINE SULFATE 5; 5; 5; 5 MG/1; MG/1; MG/1; MG/1
CAPSULE, EXTENDED RELEASE ORAL
Qty: 30 CAPSULE | Refills: 0 | Status: SHIPPED | OUTPATIENT
Start: 2023-03-09 | End: 2023-03-20

## 2023-03-17 ENCOUNTER — TELEPHONE (OUTPATIENT)
Dept: FAMILY MEDICINE | Facility: CLINIC | Age: 23
End: 2023-03-17
Payer: COMMERCIAL

## 2023-03-17 DIAGNOSIS — F90.2 ATTENTION DEFICIT HYPERACTIVITY DISORDER (ADHD), COMBINED TYPE: ICD-10-CM

## 2023-03-17 NOTE — TELEPHONE ENCOUNTER
Patient called back and was unable to find adderall of any strength at 5 pharmacies she called.  Advised patient to call at  Least 2 [place] pharmacies as they have had availability in the past.  Patient will call back with update.  Ashleigh Foy RN

## 2023-03-17 NOTE — TELEPHONE ENCOUNTER
Received call from Patient   States that has not had her adderall capsules in 1 week  Every place she has called is out of stock and on back order  Was able to get adderall 10 mg instant release tabs  Wondering if there is a different medication she can take?  Chalino Ann RN

## 2023-03-17 NOTE — TELEPHONE ENCOUNTER
Call placed to Patient  Patient will call her pharmacy and see if they have any adderall in stock  Chalino Ann RN

## 2023-03-17 NOTE — TELEPHONE ENCOUNTER
I could try sending the 15mg XR or 25mg XR if she would like?    Let me know if she has a preference (or if she want to call and see if anywhere has one or the other)    Thanks  EB

## 2023-03-20 RX ORDER — DEXTROAMPHETAMINE SACCHARATE, AMPHETAMINE ASPARTATE MONOHYDRATE, DEXTROAMPHETAMINE SULFATE AND AMPHETAMINE SULFATE 5; 5; 5; 5 MG/1; MG/1; MG/1; MG/1
CAPSULE, EXTENDED RELEASE ORAL
Qty: 30 CAPSULE | Refills: 0 | Status: SHIPPED | OUTPATIENT
Start: 2023-03-20 | End: 2023-04-10

## 2023-03-20 NOTE — TELEPHONE ENCOUNTER
Patient called and says she found the 20mg adderall at [facility] pharmacy and it has to be written for the brand name.      Elke Kirkland, LYNN Cruz

## 2023-04-10 ENCOUNTER — MYC REFILL (OUTPATIENT)
Dept: FAMILY MEDICINE | Facility: CLINIC | Age: 23
End: 2023-04-10
Payer: COMMERCIAL

## 2023-04-10 DIAGNOSIS — F90.2 ATTENTION DEFICIT HYPERACTIVITY DISORDER (ADHD), COMBINED TYPE: ICD-10-CM

## 2023-04-11 RX ORDER — DEXTROAMPHETAMINE SACCHARATE, AMPHETAMINE ASPARTATE MONOHYDRATE, DEXTROAMPHETAMINE SULFATE AND AMPHETAMINE SULFATE 5; 5; 5; 5 MG/1; MG/1; MG/1; MG/1
CAPSULE, EXTENDED RELEASE ORAL
Qty: 30 CAPSULE | Refills: 0 | Status: SHIPPED | OUTPATIENT
Start: 2023-04-11 | End: 2023-04-19

## 2023-04-11 RX ORDER — DEXTROAMPHETAMINE SACCHARATE, AMPHETAMINE ASPARTATE, DEXTROAMPHETAMINE SULFATE AND AMPHETAMINE SULFATE 2.5; 2.5; 2.5; 2.5 MG/1; MG/1; MG/1; MG/1
10 TABLET ORAL DAILY
Qty: 30 TABLET | Refills: 0 | Status: SHIPPED | OUTPATIENT
Start: 2023-04-11 | End: 2023-04-19

## 2023-04-11 NOTE — TELEPHONE ENCOUNTER
Routing refill request to provider for review/approval because:  Drug not on the [facility] refill protocol     Silvia Avles RN on 4/11/2023 at 8:23 AM

## 2023-04-19 ENCOUNTER — OFFICE VISIT (OUTPATIENT)
Dept: FAMILY MEDICINE | Facility: CLINIC | Age: 23
End: 2023-04-19
Payer: COMMERCIAL

## 2023-04-19 VITALS
RESPIRATION RATE: 16 BRPM | HEIGHT: 62 IN | TEMPERATURE: 98.6 F | HEART RATE: 107 BPM | OXYGEN SATURATION: 99 % | WEIGHT: 124 LBS | SYSTOLIC BLOOD PRESSURE: 118 MMHG | BODY MASS INDEX: 22.82 KG/M2 | DIASTOLIC BLOOD PRESSURE: 80 MMHG

## 2023-04-19 DIAGNOSIS — H53.8 BLURRED VISION: ICD-10-CM

## 2023-04-19 DIAGNOSIS — F41.9 ANXIETY: Primary | ICD-10-CM

## 2023-04-19 DIAGNOSIS — F90.2 ATTENTION DEFICIT HYPERACTIVITY DISORDER (ADHD), COMBINED TYPE: ICD-10-CM

## 2023-04-19 PROBLEM — F12.922: Status: RESOLVED | Noted: 2022-12-24 | Resolved: 2023-04-19

## 2023-04-19 PROCEDURE — 99213 OFFICE O/P EST LOW 20 MIN: CPT | Performed by: FAMILY MEDICINE

## 2023-04-19 RX ORDER — FLUOXETINE 40 MG/1
40 CAPSULE ORAL DAILY
Qty: 90 CAPSULE | Refills: 2 | Status: SHIPPED | OUTPATIENT
Start: 2023-04-19 | End: 2024-05-17

## 2023-04-19 RX ORDER — DEXTROAMPHETAMINE SACCHARATE, AMPHETAMINE ASPARTATE, DEXTROAMPHETAMINE SULFATE AND AMPHETAMINE SULFATE 7.5; 7.5; 7.5; 7.5 MG/1; MG/1; MG/1; MG/1
15 TABLET ORAL 2 TIMES DAILY
Qty: 30 TABLET | Refills: 0 | Status: SHIPPED | OUTPATIENT
Start: 2023-04-19 | End: 2023-05-19

## 2023-04-19 ASSESSMENT — PATIENT HEALTH QUESTIONNAIRE - PHQ9
SUM OF ALL RESPONSES TO PHQ QUESTIONS 1-9: 7
SUM OF ALL RESPONSES TO PHQ QUESTIONS 1-9: 7
10. IF YOU CHECKED OFF ANY PROBLEMS, HOW DIFFICULT HAVE THESE PROBLEMS MADE IT FOR YOU TO DO YOUR WORK, TAKE CARE OF THINGS AT HOME, OR GET ALONG WITH OTHER PEOPLE: SOMEWHAT DIFFICULT

## 2023-04-19 NOTE — PROGRESS NOTES
Answers for HPI/ROS submitted by the patient on 4/19/2023  If you checked off any problems, how difficult have these problems made it for you to do your work, take care of things at home, or get along with other people?: Somewhat difficult  PHQ9 TOTAL SCORE: 7    Assessment/Plan:    Girish Calderón is a 22 year old female presenting for:    Anxiety  She is doing well with the Prozac.  No signs or symptoms or concerns for eugenie (on most recent hospitalization there was a potential concern for bipolar disorder).  She is following closely with her therapist and may start decreasing therapy to every other week in the summer  - FLUoxetine (PROZAC) 40 MG capsule  Dispense: 90 capsule; Refill: 2    Blurred vision  Right eye.  Sounds to be may be more of a tension headache however I think it would be worthwhile having her eye fully examined.  Referral for the ophthalmologist was placed.  - Adult Eye  Referral    Attention deficit hyperactivity disorder (ADHD), combined type  She has been having difficulties getting the extended release 20 mg tablets.  She would like to try 50 mg immediate release in the morning and 15 in the afternoon.  Ideally she would like to get this a 30 mg tablet that she breaks in half.  This was sent to the pharmacy.  She will let me know how things go in a few weeks.  - amphetamine-dextroamphetamine (ADDERALL) 30 MG tablet  Dispense: 30 tablet; Refill: 0      Medications Discontinued During This Encounter   Medication Reason     amphetamine-dextroamphetamine (ADDERALL) 10 MG tablet      amphetamine-dextroamphetamine (ADDERALL XR) 20 MG 24 hr capsule      FLUoxetine (PROZAC) 40 MG capsule Reorder (No AVS / No eCancel)           Chief Complaint:  Recheck Medication        Subjective:   Girish Calderón is a very pleasant 22-year-old female who presents to the clinic today with concerns over medication check.    Patient has a history of anxiety.  She is currently taking fluoxetine 40 mg  "daily.  This was hesitantly started due to a possible history of bipolar disorder diagnosed in patient by hospital psychiatrist.  She states that she believes that this was a misdiagnosis.  Please see previous notes for further details.  She doing well on the fluoxetine and feels as though it is really helping her mood significantly.  She is working with a therapist weekly but will maybe be moving to every other week in the summer.    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      She has a history of ADHD.  She currently takes Adderall.  Her current prescription is 20 mg extended release in the morning with a 10 mg immediate release booster in the afternoon.  She has been having a difficult time getting the 20 mg extended release tablets and is wondering if she can get 30 mg immediate release tablet which she can break in half and take 15 mg twice daily.  She states that she tolerates the 10 mg immediate release well and feels that it works better than the extended release anyway.    Right eye blurring: Patient had a concussion back in 2020.  She had MRI scans and a full work-up which were normal.  She states that since that time she will occasionally have blurriness in her right eye.  She states that she sometimes feels as though her eye has to work a bit harder to \"focus.\"  This is intermittent but happens frequently.  She does not have any floaters in her eye.  No flashes.    12 point review of systems completed and negative except for what has been described above    History   Smoking Status     Never   Smokeless Tobacco     Never         Current Outpatient Medications:      amphetamine-dextroamphetamine (ADDERALL) 30 MG tablet, Take 0.5 tablets (15 mg) by mouth 2 times daily for 30 days, Disp: 30 tablet, Rfl: 0     FLUoxetine (PROZAC) 40 MG capsule, Take 1 capsule (40 mg) by mouth daily, Disp: 90 capsule, Rfl: 2     ketoconazole (NIZORAL) 2 % external " "shampoo, Apply topically daily as needed for itching or irritation, Disp: 120 mL, Rfl: 1     triamcinolone (KENALOG) 0.1 % external cream, Apply topically 2 times daily, Disp: 80 g, Rfl: 0      Objective:  Vitals:    04/19/23 1342   BP: 118/80   Pulse: 107   Resp: 16   Temp: 98.6  F (37  C)   TempSrc: Tympanic   SpO2: 99%   Weight: 56.2 kg (124 lb)   Height: 1.575 m (5' 2\")       Body mass index is 22.68 kg/m .    Vital signs reviewed and stable  General: No acute distress  Psych: Appropriate affect  HEENT: moist mucous membranes, pupils equal, round, reactive to light and accomodation, tympanic membranes are pearly grey bilaterally  Lymph: no cervical or supraclavicular lymphadenopathy  Cardiovascular: regular rate and rhythm with no murmur  Pulmonary: clear to auscultation bilaterally with no wheeze  Abdomen: soft, non tender, non distended with normo-active bowel sounds  Extremities: warm and well perfused with no edema  Skin: warm and dry with no rash         This note has been dictated and transcribed using voice recognition software.   Any errors in transcription are unintentional and inherent to the software.  "

## 2023-06-12 ENCOUNTER — MYC REFILL (OUTPATIENT)
Dept: FAMILY MEDICINE | Facility: CLINIC | Age: 23
End: 2023-06-12
Payer: COMMERCIAL

## 2023-06-12 DIAGNOSIS — F90.2 ATTENTION DEFICIT HYPERACTIVITY DISORDER (ADHD), COMBINED TYPE: ICD-10-CM

## 2023-06-13 RX ORDER — DEXTROAMPHETAMINE SACCHARATE, AMPHETAMINE ASPARTATE MONOHYDRATE, DEXTROAMPHETAMINE SULFATE AND AMPHETAMINE SULFATE 7.5; 7.5; 7.5; 7.5 MG/1; MG/1; MG/1; MG/1
30 CAPSULE, EXTENDED RELEASE ORAL DAILY
Qty: 30 CAPSULE | Refills: 0 | OUTPATIENT
Start: 2023-06-13

## 2023-06-19 ENCOUNTER — PATIENT OUTREACH (OUTPATIENT)
Dept: CARE COORDINATION | Facility: CLINIC | Age: 23
End: 2023-06-19
Payer: COMMERCIAL

## 2023-06-19 ENCOUNTER — TELEPHONE (OUTPATIENT)
Dept: FAMILY MEDICINE | Facility: CLINIC | Age: 23
End: 2023-06-19
Payer: COMMERCIAL

## 2023-06-19 DIAGNOSIS — F90.2 ATTENTION DEFICIT HYPERACTIVITY DISORDER (ADHD), COMBINED TYPE: ICD-10-CM

## 2023-06-19 RX ORDER — DEXTROAMPHETAMINE SACCHARATE, AMPHETAMINE ASPARTATE MONOHYDRATE, DEXTROAMPHETAMINE SULFATE AND AMPHETAMINE SULFATE 7.5; 7.5; 7.5; 7.5 MG/1; MG/1; MG/1; MG/1
30 CAPSULE, EXTENDED RELEASE ORAL DAILY
Qty: 30 CAPSULE | Refills: 0 | Status: SHIPPED | OUTPATIENT
Start: 2023-07-18 | End: 2023-07-17

## 2023-06-19 NOTE — TELEPHONE ENCOUNTER
Pt calling because she needs her adderall medication sent to a different pharmacy as the [facility] in [place] does not have this in stock. Pt would like it sent to the [facility]. Pt wondering if this can be sent to pharmacy as soon as possible.    Samina Stanley Patient

## 2023-07-03 ENCOUNTER — PATIENT OUTREACH (OUTPATIENT)
Dept: CARE COORDINATION | Facility: CLINIC | Age: 23
End: 2023-07-03
Payer: COMMERCIAL

## 2023-07-17 DIAGNOSIS — F90.2 ATTENTION DEFICIT HYPERACTIVITY DISORDER (ADHD), COMBINED TYPE: ICD-10-CM

## 2023-07-17 RX ORDER — DEXTROAMPHETAMINE SACCHARATE, AMPHETAMINE ASPARTATE MONOHYDRATE, DEXTROAMPHETAMINE SULFATE AND AMPHETAMINE SULFATE 7.5; 7.5; 7.5; 7.5 MG/1; MG/1; MG/1; MG/1
CAPSULE, EXTENDED RELEASE ORAL
Qty: 30 CAPSULE | Refills: 0 | Status: SHIPPED | OUTPATIENT
Start: 2023-07-17 | End: 2023-08-21

## 2023-08-21 ENCOUNTER — MYC REFILL (OUTPATIENT)
Dept: FAMILY MEDICINE | Facility: CLINIC | Age: 23
End: 2023-08-21
Payer: COMMERCIAL

## 2023-08-21 DIAGNOSIS — F90.2 ATTENTION DEFICIT HYPERACTIVITY DISORDER (ADHD), COMBINED TYPE: ICD-10-CM

## 2023-08-21 RX ORDER — DEXTROAMPHETAMINE SACCHARATE, AMPHETAMINE ASPARTATE MONOHYDRATE, DEXTROAMPHETAMINE SULFATE AND AMPHETAMINE SULFATE 7.5; 7.5; 7.5; 7.5 MG/1; MG/1; MG/1; MG/1
30 CAPSULE, EXTENDED RELEASE ORAL DAILY
Qty: 30 CAPSULE | Refills: 0 | Status: SHIPPED | OUTPATIENT
Start: 2023-08-21 | End: 2023-09-17

## 2023-08-21 NOTE — TELEPHONE ENCOUNTER
Routing refill request to provider for review/approval because:  Drug not on the [facility] [place]       Requested Prescriptions   Pending Prescriptions Disp Refills    amphetamine-dextroamphetamine (ADDERALL XR) 30 MG 24 hr capsule 30 capsule 0     Sig: Take 1 capsule (30 mg) by mouth daily       There is no refill protocol information for this order              Norberto Espinal RN 08/21/23 8:17 AM

## 2023-08-29 ENCOUNTER — VIRTUAL VISIT (OUTPATIENT)
Dept: FAMILY MEDICINE | Facility: CLINIC | Age: 23
End: 2023-08-29
Payer: COMMERCIAL

## 2023-08-29 DIAGNOSIS — F90.2 ATTENTION DEFICIT HYPERACTIVITY DISORDER (ADHD), COMBINED TYPE: Primary | ICD-10-CM

## 2023-08-29 PROCEDURE — 99213 OFFICE O/P EST LOW 20 MIN: CPT | Mod: VID | Performed by: FAMILY MEDICINE

## 2023-08-29 RX ORDER — DEXTROAMPHETAMINE SACCHARATE, AMPHETAMINE ASPARTATE, DEXTROAMPHETAMINE SULFATE AND AMPHETAMINE SULFATE 2.5; 2.5; 2.5; 2.5 MG/1; MG/1; MG/1; MG/1
10 TABLET ORAL DAILY
Qty: 30 TABLET | Refills: 0 | Status: SHIPPED | OUTPATIENT
Start: 2023-08-29 | End: 2023-09-25

## 2023-08-29 ASSESSMENT — PATIENT HEALTH QUESTIONNAIRE - PHQ9
SUM OF ALL RESPONSES TO PHQ QUESTIONS 1-9: 14
SUM OF ALL RESPONSES TO PHQ QUESTIONS 1-9: 14
10. IF YOU CHECKED OFF ANY PROBLEMS, HOW DIFFICULT HAVE THESE PROBLEMS MADE IT FOR YOU TO DO YOUR WORK, TAKE CARE OF THINGS AT HOME, OR GET ALONG WITH OTHER PEOPLE: VERY DIFFICULT

## 2023-08-29 NOTE — PROGRESS NOTES
Girish is a 23 year old who is being evaluated via a billable video visit.      How would you like to obtain your AVS? MyChart  If the video visit is dropped, the invitation should be resent by: Text to cell phone: [phone]  Will anyone else be joining your video visit? No      -------------------------    Assessment/Plan:    Girish Calderón is a 23 year old female presenting for:    Attention deficit hyperactivity disorder (ADHD), combined type  She will continue her 30 mg extended release Adderall in the morning and then use 10 mg immediate release in the afternoon.  She could increase to 15 mg in the afternoon if she would like.  Discussed risks and benefits of the medication.  She has tolerated immediate release well in the past when she could not get the extended release due to shortages.    She will contact me if she has any further questions or concerns.  - amphetamine-dextroamphetamine (ADDERALL) 10 MG tablet  Dispense: 30 tablet; Refill: 0        There are no discontinued medications.        Chief Complaint:  Recheck Medication          Subjective:   Girish Calderón is a pleasant 23 year old female being evaluated via video visit today for the following concern/s:    ADHD: Patient has a history of ADHD.  Currently on Adderall extended release 30 mg daily.  She states that this is worked well for her in the past but now she is in school and starting class earlier.  She will take her first dose at around 6 or 7 AM and this will wear off by early mid afternoon.  When there was a shortage of her extended release she did have some immediate release which she found to be helpful in the afternoon and wonders if she could add that on for an afternoon boost.    She would also like to discuss how to take this medication as some days she may not want to take it in the afternoon.      12 point review of systems completed and negative except for what has been described above    History   Smoking Status     "Never   Smokeless Tobacco    Never         Current Outpatient Medications:     amphetamine-dextroamphetamine (ADDERALL XR) 30 MG 24 hr capsule, Take 1 capsule (30 mg) by mouth daily, Disp: 30 capsule, Rfl: 0    amphetamine-dextroamphetamine (ADDERALL) 10 MG tablet, Take 1 tablet (10 mg) by mouth daily for 30 days In the afternoon, Disp: 30 tablet, Rfl: 0    FLUoxetine (PROZAC) 40 MG capsule, Take 1 capsule (40 mg) by mouth daily, Disp: 90 capsule, Rfl: 2    ketoconazole (NIZORAL) 2 % external shampoo, Apply topically daily as needed for itching or irritation, Disp: 120 mL, Rfl: 1    triamcinolone (KENALOG) 0.1 % external cream, Apply topically 2 times daily, Disp: 80 g, Rfl: 0        Objective:  No vitals were done due to the nature of this visit      5/18/2022     3:03 PM   Vitals - Patient Reported   Height (Patient Reported) 5' 2\"   Weight (Patient Reported) 123 lb   BMI (Based on Pt Reported Ht/Wt) 22.5 kg/m2               General: No acute distress  Psych: Appropriate affect  HEENT: moist mucous membranes  Pulmonary: Breathing comfortably, speaking in complete sentences  Extremities: warm and well perfused with no edema  Skin: warm and dry with no rash         This note has been dictated and transcribed using voice recognition software.   Any errors in transcription are unintentional and inherent to the software.    Video-Visit Details    Type of service:  Video Visit     Originating Location (pt. Location): Home    Distant Location (provider location):  On-site  Platform used for Video Visit: [employer]  Answers submitted by the patient for this visit:  Patient Health Questionnaire (Submitted on 8/29/2023)  If you checked off any problems, how difficult have these problems made it for you to do your work, take care of things at home, or get along with other people?: Very difficult  PHQ9 TOTAL SCORE: 14  General Questionnaire (Submitted on 8/23/2023)  Chief Complaint: Chronic problems general questions HPI " Form  What is the reason for your visit today? : Medications  How many servings of fruits and vegetables do you eat daily?: 2-3  On average, how many sweetened beverages do you drink each day (Examples: soda, juice, sweet tea, etc.  Do NOT count diet or artificially sweetened beverages)?: 1  How many minutes a day do you exercise enough to make your heart beat faster?: 60 or more  How many days a week do you exercise enough to make your heart beat faster?: 6  How many days per week do you miss taking your medication?: 0

## 2023-09-10 ENCOUNTER — HEALTH MAINTENANCE LETTER (OUTPATIENT)
Age: 23
End: 2023-09-10

## 2023-09-17 ENCOUNTER — MYC REFILL (OUTPATIENT)
Dept: FAMILY MEDICINE | Facility: CLINIC | Age: 23
End: 2023-09-17
Payer: COMMERCIAL

## 2023-09-17 DIAGNOSIS — F90.2 ATTENTION DEFICIT HYPERACTIVITY DISORDER (ADHD), COMBINED TYPE: ICD-10-CM

## 2023-09-18 RX ORDER — DEXTROAMPHETAMINE SACCHARATE, AMPHETAMINE ASPARTATE MONOHYDRATE, DEXTROAMPHETAMINE SULFATE AND AMPHETAMINE SULFATE 7.5; 7.5; 7.5; 7.5 MG/1; MG/1; MG/1; MG/1
30 CAPSULE, EXTENDED RELEASE ORAL DAILY
Qty: 30 CAPSULE | Refills: 0 | Status: SHIPPED | OUTPATIENT
Start: 2023-09-18 | End: 2023-09-22

## 2023-09-18 NOTE — TELEPHONE ENCOUNTER
Screening Questions  Blue=prep questions Red=location Green=sedation   1. Are you active on mychart? Y    2. What insurance is in the chart? [employer]     3.  Ordering/Referring Provider: Tamie    4. BMI 24.1, If greater than 40 review exclusion criteria also will need EXTENDED PREP    5.  Respiratory Screening (If yes to any of the following HOSPITAL setting only):     Do you use daily home oxygen? N  Do you have mod to severe Obstructive Sleep Apnea? N (can be seen at [facility] or hospital setting)    Do you have Pulmonary Hypertension? N   Do you have UNCONTROLLED asthma? N    6. Have you had a heart or lung transplant? N  (If yes, please review exclusion criteria)    7. Are you currently on dialysis?N  (If yes, schedule in HOSPITAL setting only)(If yes, please send Golytely prep)    8. Do you have chronic kidney disease? N (If yes, please send Golytely prep)    9. Have you had a stroke or Transient ischemic attack (TIA) within 6 months? N (If yes, do not schedule at [facility])    10. In the past 6 months, have you had any heart related issues including cardiomyopathy or heart attack? N (If yes, please review exclusion criteria)           If yes, did it require cardiac stenting or other implantable device?N  (If yes, please review exclusion criteria)      11. Do you have any implantable devices in your body (pacemaker, defib, LVAD)? N (If yes, schedule at [facility])    12. Do you take nitroglycerin? If yes, how often? N (if yes, schedule at HOSPITAL setting)    13. Are you currently taking any blood thinners?N (If yes- inform patient to follow up with PCP or provider for follow up instructions)     14. Are you a diabetic? N (If yes, please send Golytely prep)    15. (Females) Are you currently pregnant? N  If yes, how many weeks?      16. Are you taking any prescription pain medications on a routine schedule? N If yes, MAC sedation and patient will need EXTENDED PREP.    17. Do you have any chemical dependencies such as  alcohol, street drugs, or methadone? N If yes, MAC sedation     18. Do you have any history of post-traumatic stress syndrome, severe anxiety or history of psychosis? N  If yes, MAC sedation.     19. Do you transfer independently? Y    20.  Do you have any issues with constipation? Y   If yes, pt will need EXTENDED PREP     21. Preferred Pharmacy for Pre Prescription     Scheduling Details    Which Colonoscopy Prep was Sent?:   Type of Procedure Scheduled: EGD  Surgeon: Bartolome per order  Date of Procedure: 3/18  Location: [facility] per order  Caller (Please ask for phone number if not scheduled by patient): Girish      Sedation Type: CS  Conscious Sedation- Needs  for 6 hours after the procedure  MAC/General-Needs  for 24 hours after procedure    Pre-op Required at [facility], [facility], [place] and OR for MAC sedation: n  (if yes advise patient they will need a pre-op prior to procedure)      Informed patient they will need an adult  Y  Cannot take any type of public or medical transportation alone    Pre-Procedure Covid test to be completed at [facility] or Externally: [place] on 3/14    Confirmed Nurse will call to complete assessment Y    Additional comments:     UPDATE 2/14/2022: Per Dr. Mancuso, pt can be seen w/ CS instead of MAC for her EGD.    Pt is requesting a non-sedation EGD. Referral is for [facility], with Deep/MAC sedation.   I sent an in basket msg to Dr. Mancuso asking for direction on this.     Pt says she had a conversation with Dr. Mancuso in September 2021 about instead having some type tablet sedation instead?    My signature below certifies that the above stated patient is homebound and upon completion of the Face-To-Face encounter, has the need for intermittent skilled nursing, physical therapy and/or speech or occupational therapy services in their home for their current diagnosis as outlined in their initial plan of care. These services will continue to be monitored by myself or another physician.

## 2023-09-22 DIAGNOSIS — F90.2 ATTENTION DEFICIT HYPERACTIVITY DISORDER (ADHD), COMBINED TYPE: ICD-10-CM

## 2023-09-22 RX ORDER — DEXTROAMPHETAMINE SACCHARATE, AMPHETAMINE ASPARTATE MONOHYDRATE, DEXTROAMPHETAMINE SULFATE AND AMPHETAMINE SULFATE 7.5; 7.5; 7.5; 7.5 MG/1; MG/1; MG/1; MG/1
30 CAPSULE, EXTENDED RELEASE ORAL DAILY
Qty: 30 CAPSULE | Refills: 0 | Status: SHIPPED | OUTPATIENT
Start: 2023-09-22 | End: 2023-11-07

## 2023-09-22 NOTE — TELEPHONE ENCOUNTER
Medication Question or Refill        What medication are you calling about (include dose and sig)?: amphetamine-dextroamphetamine (ADDERALL) 30 MG 24 HR CAPSULE    Preferred Pharmacy:   [facility] [place] - [address] [facility]  [address] [facility] [place]  [facility] [address]  Phone: [phone] Fax: [phone]  ontrolled Substance Agreement on file:   CSA -- Patient Level:    CSA: None found at the patient level.       Who prescribed the medication?: YO    Do you need a refill? Yes PHARMACY DOES NOT HAVE THE XR. PLEASE WRITE A NEW RX FOR THE REGULAR ADDERALL. She will do to this to get her ADDERALL since they are not able to get the XR extended release at this time. She has been out of meds for 3 days waiting for this.    When did you use the medication last? New RX needs to go tt [facility] today please    Patient offered an appointment? No    Do you have any questions or concerns?  No      Could we send this information to you in [facility] or would you prefer to receive a phone call?:   Patient would prefer a phone call   Okay to leave a detailed message?: Yes at Home number on file [phone] (home)

## 2023-09-25 ENCOUNTER — TELEPHONE (OUTPATIENT)
Dept: FAMILY MEDICINE | Facility: CLINIC | Age: 23
End: 2023-09-25
Payer: COMMERCIAL

## 2023-09-25 DIAGNOSIS — F90.2 ATTENTION DEFICIT HYPERACTIVITY DISORDER (ADHD), COMBINED TYPE: ICD-10-CM

## 2023-09-25 RX ORDER — DEXTROAMPHETAMINE SACCHARATE, AMPHETAMINE ASPARTATE, DEXTROAMPHETAMINE SULFATE AND AMPHETAMINE SULFATE 7.5; 7.5; 7.5; 7.5 MG/1; MG/1; MG/1; MG/1
30 TABLET ORAL DAILY
Qty: 30 TABLET | Refills: 0 | Status: SHIPPED | OUTPATIENT
Start: 2023-09-25 | End: 2023-11-05

## 2023-09-25 RX ORDER — DEXTROAMPHETAMINE SACCHARATE, AMPHETAMINE ASPARTATE, DEXTROAMPHETAMINE SULFATE AND AMPHETAMINE SULFATE 2.5; 2.5; 2.5; 2.5 MG/1; MG/1; MG/1; MG/1
10 TABLET ORAL DAILY
Qty: 30 TABLET | Refills: 0 | Status: SHIPPED | OUTPATIENT
Start: 2023-09-25 | End: 2024-02-06

## 2023-09-25 NOTE — TELEPHONE ENCOUNTER
Patient states the [facility] is all out of the Adderall XR 30 mg uncertain when the next supply will be in.     Patient is requesting to temporarily changed to Adderall IR 30 mg as she has done this in the past per patient, she will also need a refill of her Adderalll IR 10 mg sent to the pharmacy.    Last Written Prescription Date:  8/29/23 for Adderall 10 mg  Last Fill Quantity: 30,  # refills: 0   Last office visit: 4/19/2023 ; last virtual visit: 8/29/2023 with prescribing provider:     Future Office Visit:        Last Written Prescription Date:  8/21/23 for Adderall XR 30 mg  Last Fill Quantity: 30,  # refills: 0   Last office visit: 4/19/2023 ; last virtual visit: 8/29/2023 with prescribing provider:     Future Office Visit:      RX's pended and message routed to provider for consideration. Patient is all out of medication.    Julie Behrendt RN

## 2023-10-31 ENCOUNTER — HOSPITAL ENCOUNTER (EMERGENCY)
Facility: CLINIC | Age: 23
Discharge: HOME | End: 2023-10-31
Attending: PHYSICIAN ASSISTANT | Admitting: PHYSICIAN ASSISTANT
Payer: COMMERCIAL

## 2023-10-31 VITALS — TEMPERATURE: 98.7 F | SYSTOLIC BLOOD PRESSURE: 138 MMHG | OXYGEN SATURATION: 99 % | DIASTOLIC BLOOD PRESSURE: 87 MMHG

## 2023-10-31 DIAGNOSIS — S46.811A TRAPEZIUS MUSCLE STRAIN, RIGHT, INITIAL ENCOUNTER: ICD-10-CM

## 2023-10-31 DIAGNOSIS — H92.01 OTALGIA, RIGHT: ICD-10-CM

## 2023-10-31 PROCEDURE — G0463 HOSPITAL OUTPT CLINIC VISIT: HCPCS | Performed by: PHYSICIAN ASSISTANT

## 2023-10-31 PROCEDURE — 99283 EMERGENCY DEPT VISIT LOW MDM: CPT | Performed by: PHYSICIAN ASSISTANT

## 2023-10-31 PROCEDURE — 99214 OFFICE O/P EST MOD 30 MIN: CPT | Performed by: PHYSICIAN ASSISTANT
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
## 2023-11-05 ENCOUNTER — MYC REFILL (OUTPATIENT)
Dept: FAMILY MEDICINE | Facility: CLINIC | Age: 23
End: 2023-11-05
Payer: COMMERCIAL

## 2023-11-05 DIAGNOSIS — F90.2 ATTENTION DEFICIT HYPERACTIVITY DISORDER (ADHD), COMBINED TYPE: ICD-10-CM

## 2023-11-06 RX ORDER — DEXTROAMPHETAMINE SACCHARATE, AMPHETAMINE ASPARTATE, DEXTROAMPHETAMINE SULFATE AND AMPHETAMINE SULFATE 7.5; 7.5; 7.5; 7.5 MG/1; MG/1; MG/1; MG/1
30 TABLET ORAL DAILY
Qty: 30 TABLET | Refills: 0 | Status: SHIPPED | OUTPATIENT
Start: 2023-11-06 | End: 2023-12-06

## 2023-11-07 ENCOUNTER — THERAPY VISIT (OUTPATIENT)
Dept: PHYSICAL THERAPY | Facility: CLINIC | Age: 23
End: 2023-11-07
Attending: PHYSICIAN ASSISTANT
Payer: COMMERCIAL

## 2023-11-07 ENCOUNTER — VIRTUAL VISIT (OUTPATIENT)
Dept: FAMILY MEDICINE | Facility: CLINIC | Age: 23
End: 2023-11-07
Payer: COMMERCIAL

## 2023-11-07 DIAGNOSIS — F41.9 ANXIETY: ICD-10-CM

## 2023-11-07 DIAGNOSIS — S46.811A TRAPEZIUS MUSCLE STRAIN, RIGHT, INITIAL ENCOUNTER: ICD-10-CM

## 2023-11-07 DIAGNOSIS — M54.2 NECK PAIN: Primary | ICD-10-CM

## 2023-11-07 DIAGNOSIS — F90.2 ATTENTION DEFICIT HYPERACTIVITY DISORDER (ADHD), COMBINED TYPE: ICD-10-CM

## 2023-11-07 DIAGNOSIS — F33.1 MAJOR DEPRESSIVE DISORDER, RECURRENT EPISODE, MODERATE (H): ICD-10-CM

## 2023-11-07 PROCEDURE — 97162 PT EVAL MOD COMPLEX 30 MIN: CPT | Mod: GP

## 2023-11-07 PROCEDURE — 99214 OFFICE O/P EST MOD 30 MIN: CPT | Mod: VID | Performed by: FAMILY MEDICINE

## 2023-11-07 PROCEDURE — 97110 THERAPEUTIC EXERCISES: CPT | Mod: 59

## 2023-11-07 PROCEDURE — 97530 THERAPEUTIC ACTIVITIES: CPT | Mod: GP

## 2023-11-07 ASSESSMENT — PATIENT HEALTH QUESTIONNAIRE - PHQ9
SUM OF ALL RESPONSES TO PHQ QUESTIONS 1-9: 17
10. IF YOU CHECKED OFF ANY PROBLEMS, HOW DIFFICULT HAVE THESE PROBLEMS MADE IT FOR YOU TO DO YOUR WORK, TAKE CARE OF THINGS AT HOME, OR GET ALONG WITH OTHER PEOPLE: VERY DIFFICULT
SUM OF ALL RESPONSES TO PHQ QUESTIONS 1-9: 17

## 2023-11-07 NOTE — PROGRESS NOTES
Girish is a 23 year old who is being evaluated via a billable video visit.      How would you like to obtain your AVS? MyChart  If the video visit is dropped, the invitation should be resent by: Text to cell phone: [phone]  Will anyone else be joining your video visit? No      -------------------------    Assessment/Plan:    Girish Calderón is a 23 year old female presenting for:    Neck pain  Laboratory testing as below.  Patient symptoms are not necessarily consistent with rheumatoid arthritis but given family history I think it is reasonable to screen.  If laboratory testing come back abnormal could have her see the rheumatologist.  Otherwise, will be following with our physical therapist and has that appointment scheduled.  - CBC with platelets and differential  - Comprehensive metabolic panel (BMP + Alb, Alk Phos, ALT, AST, Total. Bili, TP)  - Anti Nuclear Stephanie IgG by IFA with Reflex  - Cyclic Citrullinated Peptide Antibody IgG  - Rheumatoid factor  - ESR: Erythrocyte sedimentation rate  - CRP, inflammation  - TSH with free T4 reflex    Major depressive disorder, recurrent episode, moderate (H)  Patient feels stable at this time.  Continue Prozac    Anxiety  Stable at this time    Attention deficit hyperactivity disorder (ADHD), combined type  Does not need refills at this time        Medications Discontinued During This Encounter   Medication Reason    amphetamine-dextroamphetamine (ADDERALL XR) 30 MG 24 hr capsule            Chief Complaint:  Referral and Follow Up          Subjective:   Girish Calderón is a pleasant 23 year old female being evaluated via video visit today for the following concern/s:    Follow-up: Patient is following up from a recent ER visit.  Patient states that she had severe pain in her right neck and upper posterior shoulder.  She also had pain in her ear.  She had been having some sweats.  She was seen in the urgent care and found to have a trapezius muscle  "strain.    Patient notes that she will occasionally have muscular aches.  She notes that these will encompass her fingers wrist elbows shoulders knees and ankles.  She does not notice any swelling.  She is unsure if these are worse in the morning or worsened throughout the day.    There is a family history of rheumatoid arthritis and she is hopeful to be screened for that.    History of depression and anxiety.  She states that when she was having symptoms she felt as though her anxiety was increasing as well.      12 point review of systems completed and negative except for what has been described above    History   Smoking Status    Never   Smokeless Tobacco    Never         Current Outpatient Medications:     amphetamine-dextroamphetamine (ADDERALL) 10 MG tablet, Take 1 tablet (10 mg) by mouth daily In the afternoon, Disp: 30 tablet, Rfl: 0    amphetamine-dextroamphetamine (ADDERALL) 30 MG tablet, Take 1 tablet (30 mg) by mouth daily, Disp: 30 tablet, Rfl: 0    FLUoxetine (PROZAC) 40 MG capsule, Take 1 capsule (40 mg) by mouth daily, Disp: 90 capsule, Rfl: 2    ketoconazole (NIZORAL) 2 % external shampoo, Apply topically daily as needed for itching or irritation, Disp: 120 mL, Rfl: 1    triamcinolone (KENALOG) 0.1 % external cream, Apply topically 2 times daily, Disp: 80 g, Rfl: 0        Objective:  No vitals were done due to the nature of this visit      5/18/2022     3:03 PM   Vitals - Patient Reported   Height (Patient Reported) 5' 2\"   Weight (Patient Reported) 123 lb   BMI (Based on Pt Reported Ht/Wt) 22.5 kg/m2               General: No acute distress  Psych: Appropriate affect  HEENT: moist mucous membranes  Pulmonary: Breathing comfortably, speaking in complete sentences  Extremities: warm and well perfused with no edema  Skin: warm and dry with no rash         This note has been dictated and transcribed using voice recognition software.   Any errors in transcription are unintentional and inherent to the " software.      Video-Visit Details    Type of service:  Video Visit     Originating Location (pt. Location): Home    Distant Location (provider location):  On-site  Platform used for Video Visit: [employer]      Answers submitted by the patient for this visit:  Patient Health Questionnaire (Submitted on 11/7/2023)  If you checked off any problems, how difficult have these problems made it for you to do your work, take care of things at home, or get along with other people?: Very difficult  PHQ9 TOTAL SCORE: 17
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
## 2023-11-09 ENCOUNTER — APPOINTMENT (OUTPATIENT)
Dept: CT IMAGING | Facility: CLINIC | Age: 23
End: 2023-11-09
Attending: EMERGENCY MEDICINE
Payer: COMMERCIAL

## 2023-11-09 ENCOUNTER — HOSPITAL ENCOUNTER (EMERGENCY)
Facility: CLINIC | Age: 23
Discharge: HOME | End: 2023-11-09
Attending: EMERGENCY MEDICINE | Admitting: EMERGENCY MEDICINE
Payer: COMMERCIAL

## 2023-11-09 VITALS
BODY MASS INDEX: 23 KG/M2 | HEART RATE: 91 BPM | SYSTOLIC BLOOD PRESSURE: 132 MMHG | RESPIRATION RATE: 18 BRPM | WEIGHT: 125 LBS | TEMPERATURE: 98.6 F | HEIGHT: 62 IN | OXYGEN SATURATION: 98 % | DIASTOLIC BLOOD PRESSURE: 79 MMHG

## 2023-11-09 DIAGNOSIS — M54.2 CERVICAL MUSCLE PAIN: ICD-10-CM

## 2023-11-09 DIAGNOSIS — F41.9 ANXIETY: ICD-10-CM

## 2023-11-09 PROBLEM — S46.811A TRAPEZIUS MUSCLE STRAIN, RIGHT, INITIAL ENCOUNTER: Status: ACTIVE | Noted: 2023-11-09

## 2023-11-09 LAB
ALBUMIN SERPL BCG-MCNC: 4.5 G/DL (ref 3.5–5.2)
ALP SERPL-CCNC: 45 U/L (ref 35–104)
ALT SERPL W P-5'-P-CCNC: 14 U/L (ref 0–50)
ANION GAP SERPL CALCULATED.3IONS-SCNC: 14 MMOL/L (ref 7–15)
AST SERPL W P-5'-P-CCNC: 20 U/L (ref 0–45)
BASOPHILS # BLD AUTO: 0.1 10E3/UL (ref 0–0.2)
BASOPHILS NFR BLD AUTO: 1 %
BILIRUB SERPL-MCNC: 0.4 MG/DL
BUN SERPL-MCNC: 6.8 MG/DL (ref 6–20)
CALCIUM SERPL-MCNC: 9.1 MG/DL (ref 8.6–10)
CHLORIDE SERPL-SCNC: 101 MMOL/L (ref 98–107)
CREAT SERPL-MCNC: 0.75 MG/DL (ref 0.51–0.95)
CRP SERPL-MCNC: <3 MG/L
DEPRECATED HCO3 PLAS-SCNC: 21 MMOL/L (ref 22–29)
EGFRCR SERPLBLD CKD-EPI 2021: >90 ML/MIN/1.73M2
EOSINOPHIL # BLD AUTO: 0 10E3/UL (ref 0–0.7)
EOSINOPHIL NFR BLD AUTO: 0 %
ERYTHROCYTE [DISTWIDTH] IN BLOOD BY AUTOMATED COUNT: 11.9 % (ref 10–15)
ERYTHROCYTE [SEDIMENTATION RATE] IN BLOOD BY WESTERGREN METHOD: 3 MM/HR (ref 0–20)
GLUCOSE SERPL-MCNC: 87 MG/DL (ref 70–99)
HCT VFR BLD AUTO: 38.5 % (ref 35–47)
HGB BLD-MCNC: 12.2 G/DL (ref 11.7–15.7)
IMM GRANULOCYTES # BLD: 0 10E3/UL
IMM GRANULOCYTES NFR BLD: 0 %
LYMPHOCYTES # BLD AUTO: 1.5 10E3/UL (ref 0.8–5.3)
LYMPHOCYTES NFR BLD AUTO: 14 %
MCH RBC QN AUTO: 32.3 PG (ref 26.5–33)
MCHC RBC AUTO-ENTMCNC: 31.7 G/DL (ref 31.5–36.5)
MCV RBC AUTO: 102 FL (ref 78–100)
MONOCYTES # BLD AUTO: 0.8 10E3/UL (ref 0–1.3)
MONOCYTES NFR BLD AUTO: 7 %
NEUTROPHILS # BLD AUTO: 8 10E3/UL (ref 1.6–8.3)
NEUTROPHILS NFR BLD AUTO: 78 %
NRBC # BLD AUTO: 0 10E3/UL
NRBC BLD AUTO-RTO: 0 /100
PLATELET # BLD AUTO: 273 10E3/UL (ref 150–450)
POTASSIUM SERPL-SCNC: 4 MMOL/L (ref 3.4–5.3)
PROT SERPL-MCNC: 6.9 G/DL (ref 6.4–8.3)
RBC # BLD AUTO: 3.78 10E6/UL (ref 3.8–5.2)
SODIUM SERPL-SCNC: 136 MMOL/L (ref 135–145)
TSH SERPL DL<=0.005 MIU/L-ACNC: 2.71 UIU/ML (ref 0.3–4.2)
WBC # BLD AUTO: 10.4 10E3/UL (ref 4–11)

## 2023-11-09 PROCEDURE — 250N000009 HC RX 250: Performed by: EMERGENCY MEDICINE

## 2023-11-09 PROCEDURE — 70498 CT ANGIOGRAPHY NECK: CPT

## 2023-11-09 PROCEDURE — 99285 EMERGENCY DEPT VISIT HI MDM: CPT | Mod: 25

## 2023-11-09 PROCEDURE — 84443 ASSAY THYROID STIM HORMONE: CPT | Mod: VID | Performed by: FAMILY MEDICINE

## 2023-11-09 PROCEDURE — 85025 COMPLETE CBC W/AUTO DIFF WBC: CPT | Mod: VID | Performed by: FAMILY MEDICINE

## 2023-11-09 PROCEDURE — 86431 RHEUMATOID FACTOR QUANT: CPT | Mod: VID | Performed by: FAMILY MEDICINE

## 2023-11-09 PROCEDURE — 80053 COMPREHEN METABOLIC PANEL: CPT | Mod: VID | Performed by: FAMILY MEDICINE

## 2023-11-09 PROCEDURE — 86140 C-REACTIVE PROTEIN: CPT | Mod: VID | Performed by: FAMILY MEDICINE

## 2023-11-09 PROCEDURE — 36415 COLL VENOUS BLD VENIPUNCTURE: CPT | Mod: VID | Performed by: FAMILY MEDICINE

## 2023-11-09 PROCEDURE — 85652 RBC SED RATE AUTOMATED: CPT | Mod: VID | Performed by: FAMILY MEDICINE

## 2023-11-09 PROCEDURE — 86200 CCP ANTIBODY: CPT | Mod: VID | Performed by: FAMILY MEDICINE

## 2023-11-09 PROCEDURE — 99284 EMERGENCY DEPT VISIT MOD MDM: CPT | Performed by: EMERGENCY MEDICINE

## 2023-11-09 PROCEDURE — 250N000011 HC RX IP 250 OP 636: Performed by: EMERGENCY MEDICINE

## 2023-11-09 PROCEDURE — 72125 CT NECK SPINE W/O DYE: CPT

## 2023-11-09 PROCEDURE — 86038 ANTINUCLEAR ANTIBODIES: CPT | Mod: VID | Performed by: FAMILY MEDICINE

## 2023-11-09 RX ORDER — IOPAMIDOL 755 MG/ML
67 INJECTION, SOLUTION INTRAVASCULAR ONCE
Status: COMPLETED | OUTPATIENT
Start: 2023-11-09 | End: 2023-11-09

## 2023-11-09 RX ADMIN — SODIUM CHLORIDE 100 ML: 9 INJECTION, SOLUTION INTRAVENOUS at 18:30

## 2023-11-09 RX ADMIN — IOPAMIDOL 67 ML: 755 INJECTION, SOLUTION INTRAVENOUS at 18:30

## 2023-11-09 ASSESSMENT — ACTIVITIES OF DAILY LIVING (ADL)
ADLS_ACUITY_SCORE: 35
ADLS_ACUITY_SCORE: 33
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
## 2023-11-10 ENCOUNTER — PATIENT OUTREACH (OUTPATIENT)
Dept: CARE COORDINATION | Facility: CLINIC | Age: 23
End: 2023-11-10
Payer: COMMERCIAL

## 2023-11-10 LAB
ANA SER QL IF: NEGATIVE
RHEUMATOID FACT SER NEPH-ACNC: <6 IU/ML

## 2023-11-10 NOTE — LETTER
Girish Calderón  [address] [place] [address]    Dear Girish Calderón,      I am a team member within the [facility] Resource Center with [facility]. I recently contacted you to ensure you were doing well following a recent visit within our health system. I also wanted to take this chance to introduce Clinic Care Coordination should you have any interest in this program in the future.    Below is a description of Clinic Care Coordination and how this team can further assist you:       The Clinic Care Coordination team is made up of a Registered Nurse, , Financial Resource Worker, and a Community Health Worker who understand and can help navigate the health care system. The goal of clinic care coordination is to help you manage your health, improve access to care, and achieve optimal health outcomes. They work alongside your provider to assist you in determining your health and social needs, obtain health care and community resources, and provide you with necessary information and education. Clinic Care Coordination can work with you through any barriers and develop a care plan that helps coordinate and strengthen the relationship between you and your care team.    If you wish to connect with the Clinic Care Coordination Team, please let your [facility] Primary Care Provider or Clinic Care Team know and they can place a referral. The Clinic Care Coordination team will then reach out by phone to further support you.    We are focused on providing you with the highest-quality healthcare experience possible.    Sincerely,   Your care team with [facility]    
The patient is a 77y Male complaining of shortness of breath.

## 2023-11-10 NOTE — PROGRESS NOTES
Clinic Care Coordination Contact  Community Health Worker Initial Outreach    CHW Initial Information Gathering:  Referral Source: ED Follow-Up  CHW Additional Questions  MyChart active?: Yes    Patient accepts CC: No, patient declined at this time. Patient will be sent Care Coordination introduction letter for future reference.       Kelsey Smith  Community Health Worker  Connected Care Resource Center, [facility]    *Connected Care Resource Team does NOT follow patient ongoing. Referrals are identified based on internal discharge reports and the outreach is to ensure patient has an understanding of their discharge instructions.

## 2023-11-10 NOTE — DISCHARGE INSTRUCTIONS
Ibuprofen 400-600 mg every 6-8 hours, acetaminophen 650 mg every 6-8 hours    Continue with physical therapy    Follow-up sports medicine regarding evaluation for post concussive syndrome

## 2023-11-13 LAB — CCP AB SER IA-ACNC: 1 U/ML

## 2023-11-21 ENCOUNTER — THERAPY VISIT (OUTPATIENT)
Dept: PHYSICAL THERAPY | Facility: CLINIC | Age: 23
End: 2023-11-21
Attending: PHYSICIAN ASSISTANT
Payer: COMMERCIAL

## 2023-11-21 DIAGNOSIS — S46.811A TRAPEZIUS MUSCLE STRAIN, RIGHT, INITIAL ENCOUNTER: Primary | ICD-10-CM

## 2023-11-21 PROCEDURE — 97110 THERAPEUTIC EXERCISES: CPT | Mod: GP

## 2023-11-21 PROCEDURE — 97112 NEUROMUSCULAR REEDUCATION: CPT | Mod: GP

## 2023-11-21 PROCEDURE — 97530 THERAPEUTIC ACTIVITIES: CPT | Mod: GP

## 2023-11-21 PROCEDURE — 97140 MANUAL THERAPY 1/> REGIONS: CPT | Mod: GP

## 2023-11-27 ENCOUNTER — THERAPY VISIT (OUTPATIENT)
Dept: PHYSICAL THERAPY | Facility: CLINIC | Age: 23
End: 2023-11-27
Attending: PHYSICIAN ASSISTANT
Payer: COMMERCIAL

## 2023-11-27 DIAGNOSIS — S46.811A TRAPEZIUS MUSCLE STRAIN, RIGHT, INITIAL ENCOUNTER: Primary | ICD-10-CM

## 2023-11-27 PROCEDURE — 97112 NEUROMUSCULAR REEDUCATION: CPT | Mod: GP

## 2023-11-27 PROCEDURE — 97530 THERAPEUTIC ACTIVITIES: CPT | Mod: GP

## 2023-11-27 PROCEDURE — 97110 THERAPEUTIC EXERCISES: CPT | Mod: GP

## 2023-12-04 ENCOUNTER — THERAPY VISIT (OUTPATIENT)
Dept: PHYSICAL THERAPY | Facility: CLINIC | Age: 23
End: 2023-12-04
Attending: PHYSICIAN ASSISTANT
Payer: COMMERCIAL

## 2023-12-04 DIAGNOSIS — S46.811A TRAPEZIUS MUSCLE STRAIN, RIGHT, INITIAL ENCOUNTER: Primary | ICD-10-CM

## 2023-12-04 PROCEDURE — 97110 THERAPEUTIC EXERCISES: CPT | Mod: GP

## 2023-12-04 PROCEDURE — 97112 NEUROMUSCULAR REEDUCATION: CPT | Mod: GP

## 2023-12-06 ENCOUNTER — MYC REFILL (OUTPATIENT)
Dept: FAMILY MEDICINE | Facility: CLINIC | Age: 23
End: 2023-12-06
Payer: COMMERCIAL

## 2023-12-06 DIAGNOSIS — F90.2 ATTENTION DEFICIT HYPERACTIVITY DISORDER (ADHD), COMBINED TYPE: ICD-10-CM

## 2023-12-06 RX ORDER — DEXTROAMPHETAMINE SACCHARATE, AMPHETAMINE ASPARTATE, DEXTROAMPHETAMINE SULFATE AND AMPHETAMINE SULFATE 7.5; 7.5; 7.5; 7.5 MG/1; MG/1; MG/1; MG/1
30 TABLET ORAL DAILY
Qty: 30 TABLET | Refills: 0 | Status: SHIPPED | OUTPATIENT
Start: 2023-12-06 | End: 2023-12-28

## 2023-12-28 ENCOUNTER — MYC REFILL (OUTPATIENT)
Dept: FAMILY MEDICINE | Facility: CLINIC | Age: 23
End: 2023-12-28
Payer: COMMERCIAL

## 2023-12-28 DIAGNOSIS — F90.2 ATTENTION DEFICIT HYPERACTIVITY DISORDER (ADHD), COMBINED TYPE: ICD-10-CM

## 2023-12-29 RX ORDER — DEXTROAMPHETAMINE SACCHARATE, AMPHETAMINE ASPARTATE, DEXTROAMPHETAMINE SULFATE AND AMPHETAMINE SULFATE 7.5; 7.5; 7.5; 7.5 MG/1; MG/1; MG/1; MG/1
30 TABLET ORAL DAILY
Qty: 30 TABLET | Refills: 0 | Status: SHIPPED | OUTPATIENT
Start: 2023-12-29 | End: 2024-01-24

## 2024-01-24 ENCOUNTER — MYC REFILL (OUTPATIENT)
Dept: FAMILY MEDICINE | Facility: CLINIC | Age: 24
End: 2024-01-24
Payer: COMMERCIAL

## 2024-01-24 DIAGNOSIS — F90.2 ATTENTION DEFICIT HYPERACTIVITY DISORDER (ADHD), COMBINED TYPE: ICD-10-CM

## 2024-01-24 RX ORDER — DEXTROAMPHETAMINE SACCHARATE, AMPHETAMINE ASPARTATE, DEXTROAMPHETAMINE SULFATE AND AMPHETAMINE SULFATE 7.5; 7.5; 7.5; 7.5 MG/1; MG/1; MG/1; MG/1
30 TABLET ORAL DAILY
Qty: 30 TABLET | Refills: 0 | Status: SHIPPED | OUTPATIENT
Start: 2024-01-24 | End: 2024-02-27

## 2024-01-24 NOTE — TELEPHONE ENCOUNTER
Pending Prescriptions:                       Disp   Refills    amphetamine-dextroamphetamine (ADDERALL) *30 tab*0            Sig: Take 1 tablet (30 mg) by mouth daily    Routing refill request to provider for review/approval because:  Drug not on the G refill protocol       Yonathan Cosme RN

## 2024-02-06 ENCOUNTER — MYC REFILL (OUTPATIENT)
Dept: FAMILY MEDICINE | Facility: CLINIC | Age: 24
End: 2024-02-06
Payer: COMMERCIAL

## 2024-02-06 DIAGNOSIS — F90.2 ATTENTION DEFICIT HYPERACTIVITY DISORDER (ADHD), COMBINED TYPE: ICD-10-CM

## 2024-02-06 RX ORDER — DEXTROAMPHETAMINE SACCHARATE, AMPHETAMINE ASPARTATE, DEXTROAMPHETAMINE SULFATE AND AMPHETAMINE SULFATE 2.5; 2.5; 2.5; 2.5 MG/1; MG/1; MG/1; MG/1
10 TABLET ORAL DAILY
Qty: 30 TABLET | Refills: 0 | Status: SHIPPED | OUTPATIENT
Start: 2024-02-06 | End: 2024-03-05

## 2024-02-27 ENCOUNTER — MYC REFILL (OUTPATIENT)
Dept: FAMILY MEDICINE | Facility: CLINIC | Age: 24
End: 2024-02-27
Payer: COMMERCIAL

## 2024-02-27 DIAGNOSIS — F90.2 ATTENTION DEFICIT HYPERACTIVITY DISORDER (ADHD), COMBINED TYPE: ICD-10-CM

## 2024-02-27 RX ORDER — DEXTROAMPHETAMINE SACCHARATE, AMPHETAMINE ASPARTATE, DEXTROAMPHETAMINE SULFATE AND AMPHETAMINE SULFATE 7.5; 7.5; 7.5; 7.5 MG/1; MG/1; MG/1; MG/1
30 TABLET ORAL DAILY
Qty: 30 TABLET | Refills: 0 | Status: SHIPPED | OUTPATIENT
Start: 2024-02-27 | End: 2024-03-25

## 2024-02-27 NOTE — TELEPHONE ENCOUNTER
Pt calling in today looking for help. She's not sure where to start to get care for her neck pain and vision issues and she feels overwhelmed. Informed pt that I would sent a TE to Kelsey and have her reach out. I also scheduled her with spine today. Per pt, she was seen in the [place] ER on 11/9/23 and seen Leonard Serrano. Per pt, this was the worst visit she's ever had and was treated extremely bad and left feeing unheard. She has since seen her PCP but still feels overwhelmed on what she should do next.

## 2024-03-01 ENCOUNTER — PATIENT OUTREACH (OUTPATIENT)
Dept: CARE COORDINATION | Facility: CLINIC | Age: 24
End: 2024-03-01
Payer: COMMERCIAL

## 2024-03-01 NOTE — LETTER
Girish Calderón  [address] [place] [address]    Dear Girish Calderón,      I am a team member within the [facility] with  [facility]. I recently tried to reach you to ensure you were doing well following a recent visit within our health system. I also wanted to take this chance to introduce Clinic Care Coordination.     Below is a description of Clinic Care Coordination and how this team can further assist you:       The Clinic Care Coordination team is made up of a Registered Nurse, , Financial Resource Worker, and a Community Health Worker who understand and can help navigate the health care system. The goal of clinic care coordination is to help you manage your health, improve access to care, and achieve optimal health outcomes. They work alongside your provider to assist you in determining your health and social needs, obtain health care and community resources, and provide you with necessary information and education. Clinic Care Coordination can work with you through any barriers and develop a care plan that helps coordinate and strengthen the relationship between you and your care team.    If you wish to connect with the Clinic Care Coordination Team, please let your [facility] Provider or Clinic Care Team know and they can place a referral. The Clinic Care Coordination team will then reach out by phone to further support you.    We are focused on providing you with the highest-quality healthcare experience possible.    Sincerely,   Your care team with [facility]

## 2024-03-01 NOTE — PROGRESS NOTES
Clinic Care Coordination Contact  Community Health Worker Initial Outreach    CHW Initial Information Gathering:  Referral Source: Care Team  Preferred Hospital: [place], [place]  [phone]  Preferred Urgent Care: [facility] [place], [phone]  Current living arrangement:: I live in a private home with family ([facility]  Type of residence:: Private home - no stairs  Community Resources: None  Supplies Currently Used at Home: None  Equipment Currently Used at Home: none  Informal Support system:: Family (Sister, Father, Fiance)  No PCP office visit in Past Year: No  Transportation means:: Regular car  CHW Additional Questions  If ED/Hospital discharge, follow-up appointment scheduled as recommended?: N/A  Medication changes made following ED/Hospital discharge?: N/A  MyChart active?: Yes  Patient sent Social Determinants of Health questionnaire?: Yes    Patient accepts CC: Yes. Patient scheduled for assessment with CC RN on 03/05/2024 at 11:00 AM. Patient noted desire to discuss general care coordination with appointments and healthcare goals, as well as resources pertaining to health care she is eligible for either within [facility] or [facility]. She also mentioned that she would like to find a new hospital to go to when necessary as she has not been satisfied with care at [facility].       Kelsey Smith  Community Health Worker  Connected Care Resource Center, [facility]    *Connected Care Resource Team does NOT follow patient ongoing. Referrals are identified based on internal discharge reports and the outreach is to ensure patient has an understanding of their discharge instructions.

## 2024-03-05 ENCOUNTER — PATIENT OUTREACH (OUTPATIENT)
Dept: NURSING | Facility: CLINIC | Age: 24
End: 2024-03-05
Payer: COMMERCIAL

## 2024-03-05 ENCOUNTER — MYC REFILL (OUTPATIENT)
Dept: FAMILY MEDICINE | Facility: CLINIC | Age: 24
End: 2024-03-05

## 2024-03-05 DIAGNOSIS — F90.2 ATTENTION DEFICIT HYPERACTIVITY DISORDER (ADHD), COMBINED TYPE: ICD-10-CM

## 2024-03-05 RX ORDER — DEXTROAMPHETAMINE SACCHARATE, AMPHETAMINE ASPARTATE, DEXTROAMPHETAMINE SULFATE AND AMPHETAMINE SULFATE 2.5; 2.5; 2.5; 2.5 MG/1; MG/1; MG/1; MG/1
10 TABLET ORAL DAILY
Qty: 30 TABLET | Refills: 0 | Status: SHIPPED | OUTPATIENT
Start: 2024-03-05 | End: 2024-04-05

## 2024-03-05 ASSESSMENT — ACTIVITIES OF DAILY LIVING (ADL): DEPENDENT_IADLS:: INDEPENDENT

## 2024-03-05 NOTE — LETTER
[facility] CARE COORDINATION  [id number]  [facility] [address]    March 6, 2024    Girish Calderón  [address] [facility] [address]      Dear Girish,    I am a clinic care coordinator who works with Kerline Tellez MD with the [facility]. I wanted to thank you for spending the time to talk with me.  Below is a description of clinic care coordination and how I can further assist you.       The clinic care coordination team is made up of a registered nurse, , financial resource worker and community health worker who understand the health care system. The goal of clinic care coordination is to help you manage your health and improve access to the health care system. Our team works alongside your provider to assist you in determining your health and social needs. We can help you obtain health care and community resources, providing you with necessary information and education. We can work with you through any barriers and develop a care plan that helps coordinate and strengthen the communication between you and your care team.  Our services are voluntary and are offered without charge to you personally.    Please feel free to contact me with any questions or concerns regarding care coordination and what we can offer.      We are focused on providing you with the highest-quality healthcare experience possible.    Sincerely,     Trang Gallegos RN  Clinic Care Coordination  [phone]      Enclosed: I have enclosed a copy of the Patient Centered Plan of Care. This has helpful information and goals that we have talked about. Please keep this in an easy to access place to use as needed.

## 2024-03-05 NOTE — PROGRESS NOTES
Clinic Care Coordination Contact  Clinic Care Coordination Contact  OUTREACH    Referral Information:  Referral Source: Care Team    Primary Diagnosis: Psychosocial    Chief Complaint   Patient presents with    Clinic Care Coordination - Initial        Universal Utilization: see below; Goal created  Clinic Utilization  Difficulty keeping appointments:: No  Compliance Concerns: No  No-Show Concerns: No  No PCP office visit in Past Year: No  Utilization      No Show Count (past year)  1             ED Visits  2             Hospital Admissions  0                    Current as of: 3/5/2024  3:22 AM                Clinical Concerns:  Current Medical Concerns:    Patient Active Problem List   Diagnosis    Generalized anxiety disorder    Recurrent major depressive disorder, in partial remission (H24)    Migraine with aura and without status migrainosus, not intractable    Major depressive disorder, recurrent episode, moderate (H)    Attention deficit hyperactivity disorder (ADHD), combined type    Trapezius muscle strain, right, initial encounter         Current Behavioral Concerns:     Education Provided to patient: yes.  Educated on the reason and rationale to go to the ED, Walk-in Clinic.  Also instructed on 'how to' schedule an on-line same day Virtual Visit if needed.  Girish stated an understanding.   Pain  Pain (GOAL):: No  Health Maintenance Reviewed: Due/Overdue  (AWV and other screenings)  Clinical Pathway: None    Medication Management:  Medication review status: Medications reviewed and no changes reported per patient.        Manages independently     Functional Status:  Dependent ADLs:: Independent  Dependent IADLs:: Independent  Bed or wheelchair confined:: No  Mobility Status: Independent  Fallen 2 or more times in the past year?: No  Any fall with injury in the past year?: No    Living Situation:  Current living arrangement:: I live in a private home with family (Lives in [facility])  Type of residence::  Private home - no stairs    Lifestyle & Psychosocial Needs:    Social Determinants of Health     Food Insecurity: Low Risk  (3/4/2024)    Food Insecurity     Within the past 12 months, did you worry that your food would run out before you got money to buy more?: No     Within the past 12 months, did the food you bought just not last and you didn t have money to get more?: No   Depression: At risk (11/7/2023)    PHQ-2     PHQ-2 Score: 3   Housing Stability: Low Risk  (3/4/2024)    Housing Stability     Do you have housing? : Yes     Are you worried about losing your housing?: No   Tobacco Use: Medium Risk (11/14/2022)    Patient History     Smoking Tobacco Use: Never     Smokeless Tobacco Use: Never     Passive Exposure: Yes   Financial Resource Strain: Low Risk  (3/4/2024)    Financial Resource Strain     Within the past 12 months, have you or your family members you live with been unable to get utilities (heat, electricity) when it was really needed?: No   Alcohol Use: Not At Risk (3/4/2024)    AUDIT-C     Frequency of Alcohol Consumption: Monthly or less     Average Number of Drinks: 1 or 2     Frequency of Binge Drinking: Never   Transportation Needs: Low Risk  (3/4/2024)    Transportation Needs     Within the past 12 months, has lack of transportation kept you from medical appointments, getting your medicines, non-medical meetings or appointments, work, or from getting things that you need?: No   Physical Activity: Sufficiently Active (3/4/2024)    Exercise Vital Sign     Days of Exercise per Week: 4 days     Minutes of Exercise per Session: 50 min   Interpersonal Safety: Not on file   Stress: Stress Concern Present (3/4/2024)    [place] [employer] - Occupational Stress Questionnaire     Feeling of Stress : Very much   Social Connections: Socially Integrated (3/4/2024)    Social Connection and Isolation Panel [NHANES]     Frequency of Communication with Friends and Family: Three times a  week     Frequency of Social Gatherings with Friends and Family: Twice a week     Attends [employer]ism Services: More than 4 times per year     Active Member of Clubs or Organizations: No     Attends Club or Organization Meetings: 1 to 4 times per year     Marital Status: Living with partner     Diet:: Regular  Inadequate nutrition (GOAL):: No  Tube Feeding: No  Inadequate activity/exercise (GOAL):: No  Significant changes in sleep pattern (GOAL): No  Transportation means:: Regular car     Adventism or spiritual beliefs that impact treatment:: No  Mental health DX:: No  Mental health management concern (GOAL):: No  Informal Support system:: Family (Sister, Father, Fiance)        23 yr F PMH: as listed above.  Referral from the patient an ED hospital follow up call that the [facility] made in November.  Patient was going through her [employer] messages and noted the [facility] letter from November.  Called back to [facility] team member and was able to schedule with this writer.  Patient stating that she is looking for alternative locations aside from the [place] [facility] and ER to go to due to poor interactions in the past.  CCC RN educated her on calling nurse triage, scheduling with her PCP, scheduling same day virtual visits, etc. Reason and rationale to go to the ED.  Writer will also send additional Urgent Care locations.  Discussed that her dad may or may not have medical insurance for the month of March.  She is unsure and has had a couple of discussions with her.  Writer urged her to speak with him to clarify.  Educated that if she were to attend any appointments, or have any ED/urgent care visits without insurance that they would result in a bill.  She stated an understanding.  She will talk to her dad and may have to reschedule an upcoming spine medicine appointment.  Discussed bad debt balance.  She will call Billing to get on a payment plan today or tomorrow.  Writer will have W send a [facility] application via  [employer].  Patient Enrolled for Goals as listed below.     Resources and Interventions:  Current Resources:      Community Resources: None  Supplies Currently Used at Home: None  Equipment Currently Used at Home: none            Advance Care Plan/Directive  Advanced Care Plans/Directives on file:: No  Discussed with patient/caregiver:: Declined Further Information    Referrals Placed: None         Care Plan:  Care Plan: Utilization       Problem: Increased risk of re-admission       Goal: I would like additional resources and support to manage my health and prevent future avoidable ED visits/hospital admissions       Start Date: 3/5/2024    Note:     Barriers:   Strengths: motivated  Patient expressed understanding of goal: yes  Action steps to achieve this goal:  1. I will seek the appropriate level of care to manage my health conditions, symptoms, and questions.  2. I will take my medications as prescribed and contact my care team for questions and refill requests.  3. I will contact my Care Coordination team with questions as needed.                              Care Plan: Annual Wellness Visit       Problem: HP GENERAL PROBLEM       Goal: I will complete my annual wellness visit.       Start Date: 3/5/2024    Note:     Barriers: uncertainty about current insurance  Strengths: motivated  Patient expressed understanding of goal: yes  Action steps to achieve this goal:  1. I will schedule my annual wellness visit; [phone] ([phone])  2. I will attend my annual wellness visit. (Will schedule once current insurance status is determined)  CHW to follow up in April or May  3. I will contact my Care Management or clinic team if I have barriers to attending my annual wellness visit.                               Care Plan: Financial Wellbeing       Problem: Patient expresses financial resource strain       Goal: Create an action plan to increase financial stability       Start Date: 3/5/2024    Note:     Barriers:  financial  Strengths: motivated  Patient expressed understanding of goal: yes  Action steps to achieve this goal:  1. I will call Billing [phone] in the next week to get my items on a payment plan.  2. I will complete the [facility] application as sent to my by my CHW.  3. I will speak with my Community Healthcare Worker at outreach telephone calls.  4.  I will notify my CHW if I would like to speak with the SW in the future.                                Patient/Caregiver understanding: yes    Outreach Frequency: monthly, more frequently as needed  Future Appointments                In 3 days Gualberto Russell MD [facility], [facility] MPL[facility]            Plan: CHW to send a [facility] application via [email or web address].

## 2024-03-05 NOTE — LETTER
[facility]  Patient Centered Plan of Care  About Me:        Patient Name:  Girish Arriaga    YOB: 2000  Age:         23 year old   [employer] MRN:    [id number] Telephone Information:  Home Phone [phone]   Mobile [phone]       Address:  [address] [facility] [address] Email address:  [email or web address]      Emergency Contact(s)    Name Relationship Lgl Grd Work Phone Home Phone Mobile Phone   1. MERRY ARRIAGA Father   [phone]           Primary language:  English     needed? No   [employer]:  [phone] op. 1  Other communication barriers:None    Preferred Method of Communication:  Mail  Current living arrangement: I live in a private home with family (Lives in [facility])    Mobility Status/ Medical Equipment: Independent        Health Maintenance  Health Maintenance Reviewed: Due/Overdue  (AWV and other screenings)      My Access Plan  Medical Emergency 911   Primary Clinic Line [facility] [phone]   24 Hour Appointment Line [phone] or  [phone] ([phone]) (toll-free)   24 Hour Nurse Line [phone] (toll-free)   Preferred Urgent Care [facility], [phone]     Preferred Hospital [place], [place]  [phone]     Preferred Pharmacy [facility]     Behavioral Health Crisis Line The National Suicide Prevention Lifeline at 1-502.430.3043 or Text/Call 245           My Care Team Members  Patient Care Team         Relationship Specialty Notifications Start End    Kerline Tellez MD PCP - General Family Medicine  2/5/21     Phone: [phone] Fax: [phone] [address] [place] [address]    Kerline Tellez MD Assigned PCP   2/7/21     Phone: [phone] Fax: [phone] [address] [facility] [address]    Trang Gallegos, RN Lead Care Coordinator Primary Care - CC Admissions  3/1/24     Phone: [phone]         Sharmaine Agarwal, W Community Health Worker  Admissions 3/5/24     Phone: [phone]                     My Care Plans  Self Management and Treatment Plan    Care Plan  Care Plan: Utilization       Problem: Increased risk of re-admission       Goal: I would like additional resources and support to manage my health and prevent future avoidable ED visits/hospital admissions       Start Date: 3/5/2024    Note:     Barriers:   Strengths: motivated  Patient expressed understanding of goal: yes  Action steps to achieve this goal:  1. I will seek the appropriate level of care to manage my health conditions, symptoms, and questions.  2. I will take my medications as prescribed and contact my care team for questions and refill requests.  3. I will contact my Care Coordination team with questions as needed.                              Care Plan: Annual Wellness Visit       Problem: HP GENERAL PROBLEM       Goal: I will complete my annual wellness visit.       Start Date: 3/5/2024    Note:     Barriers: uncertainty about current insurance  Strengths: motivated  Patient expressed understanding of goal: yes  Action steps to achieve this goal:  1. I will schedule my annual wellness visit; [phone] ([phone])  2. I will attend my annual wellness visit. (Will schedule once current insurance status is determined)  CHW to follow up in April or May  3. I will contact my Care Management or clinic team if I have barriers to attending my annual wellness visit.                               Care Plan: Financial Wellbeing       Problem: Patient expresses financial resource strain       Goal: Create an action plan to increase financial stability       Start Date: 3/5/2024    Note:     Barriers: financial  Strengths: motivated  Patient expressed understanding of goal: yes  Action steps to achieve this goal:  1. I will call Billing [phone] in the next week to get my items on a payment  plan.  2. I will complete the [facility] application as sent to my by my CHW.  3. I will speak with my Community Healthcare Worker at outreach telephone calls.  4.  I will notify my CHW if I would like to speak with the SW in the future.                                Action Plans on File:                       Advance Care Plans/Directives:   Advanced Care Plan/Directives on file:   No    Discussed with patient/caregiver(s):   Declined Further Information             My Medical and Care Information  Problem List   Patient Active Problem List   Diagnosis    Generalized anxiety disorder    Recurrent major depressive disorder, in partial remission (H24)    Migraine with aura and without status migrainosus, not intractable    Major depressive disorder, recurrent episode, moderate (H)    Attention deficit hyperactivity disorder (ADHD), combined type    Trapezius muscle strain, right, initial encounter      Current Medications and Allergies:  See printed Medication Report.    Care Coordination Start Date: 2/29/2024   Frequency of Care Coordination: monthly, more frequently as needed     Form Last Updated: 03/06/2024

## 2024-03-06 ENCOUNTER — PATIENT OUTREACH (OUTPATIENT)
Dept: CARE COORDINATION | Facility: CLINIC | Age: 24
End: 2024-03-06
Payer: COMMERCIAL

## 2024-03-06 NOTE — PROGRESS NOTES
Clinic Care Coordination Contact  Community Health Worker Follow Up    Care Gaps:     Health Maintenance Due   Topic Date Due    ADVANCE CARE PLANNING  Never done    DEPRESSION ACTION PLAN  Never done    HIV SCREENING  Never done    HEPATITIS C SCREENING  Never done    YEARLY PREVENTIVE VISIT  07/19/2023    CHLAMYDIA SCREENING  07/19/2023    INFLUENZA VACCINE (1) 09/01/2023    COVID-19 Vaccine (1 - 2023-24 season) Never done       Postponed to next CHW follow up     Care Plan:   Care Plan: Utilization       Problem: Increased risk of re-admission       Goal: I would like additional resources and support to manage my health and prevent future avoidable ED visits/hospital admissions       Start Date: 3/5/2024    Note:     Barriers:   Strengths: motivated  Patient expressed understanding of goal: yes  Action steps to achieve this goal:  1. I will seek the appropriate level of care to manage my health conditions, symptoms, and questions.  2. I will take my medications as prescribed and contact my care team for questions and refill requests.  3. I will contact my Care Coordination team with questions as needed.                              Care Plan: Annual Wellness Visit       Problem: HP GENERAL PROBLEM       Goal: I will complete my annual wellness visit.       Start Date: 3/5/2024    Note:     Barriers: uncertainty about current insurance  Strengths: motivated  Patient expressed understanding of goal: yes  Action steps to achieve this goal:  1. I will schedule my annual wellness visit; [phone] ([phone])  2. I will attend my annual wellness visit. (Will schedule once current insurance status is determined)  CHW to follow up in April or May  3. I will contact my Care Management or clinic team if I have barriers to attending my annual wellness visit.                               Care Plan: Financial Wellbeing       Problem: Patient expresses financial resource strain       Goal: Create an action plan to  increase financial stability       Start Date: 3/5/2024    Note:     Barriers: financial  Strengths: motivated  Patient expressed understanding of goal: yes  Action steps to achieve this goal:  1. I will call Billing [phone] in the next week to get my items on a payment plan.  2. I will complete the [facility] application as sent to my by my CHW. (CHW sent patient [facility] application today 3/6/24)  3. I will speak with my Community Healthcare Worker at outreach telephone calls.  4.  I will notify my CHW if I would like to speak with the SW in the future.                                Intervention and Education during outreach: I received a message from [facility] RN Trang asking for me to send [facility] care application to patient through [employer]. [facility] care application was sent to patient by [employer] today 3/6/24.     CHW Plan: CHW will follow up with patient in about 1 month.     Sharmaine Agarwal  Community Health Worker  [facility] Care Coordination   [place], [facility], [place], [place], [facility]  Office: [phone]

## 2024-03-25 ENCOUNTER — MYC REFILL (OUTPATIENT)
Dept: FAMILY MEDICINE | Facility: CLINIC | Age: 24
End: 2024-03-25
Payer: COMMERCIAL

## 2024-03-25 DIAGNOSIS — F90.2 ATTENTION DEFICIT HYPERACTIVITY DISORDER (ADHD), COMBINED TYPE: ICD-10-CM

## 2024-03-25 RX ORDER — DEXTROAMPHETAMINE SACCHARATE, AMPHETAMINE ASPARTATE, DEXTROAMPHETAMINE SULFATE AND AMPHETAMINE SULFATE 7.5; 7.5; 7.5; 7.5 MG/1; MG/1; MG/1; MG/1
30 TABLET ORAL DAILY
Qty: 30 TABLET | Refills: 0 | Status: SHIPPED | OUTPATIENT
Start: 2024-03-25 | End: 2024-04-21

## 2024-03-25 NOTE — PROGRESS NOTES
DISCHARGE  Reason for Discharge: Patient has not made expected progress due to interrupted treatment attendance.  Patient has failed to schedule further appointments.    Equipment Issued: na     Discharge Plan: Patient to continue home program.    Referring Provider:  Kaylynn Piedra

## 2024-04-08 ENCOUNTER — OFFICE VISIT (OUTPATIENT)
Dept: PHYSICAL MEDICINE AND REHAB | Facility: CLINIC | Age: 24
End: 2024-04-08
Payer: COMMERCIAL

## 2024-04-08 VITALS
HEIGHT: 62 IN | WEIGHT: 118.5 LBS | HEART RATE: 122 BPM | OXYGEN SATURATION: 100 % | DIASTOLIC BLOOD PRESSURE: 81 MMHG | SYSTOLIC BLOOD PRESSURE: 138 MMHG | BODY MASS INDEX: 21.81 KG/M2

## 2024-04-08 DIAGNOSIS — G89.29 CHRONIC NECK PAIN: ICD-10-CM

## 2024-04-08 DIAGNOSIS — M54.12 CERVICAL RADICULOPATHY: ICD-10-CM

## 2024-04-08 DIAGNOSIS — M54.2 CHRONIC NECK PAIN: ICD-10-CM

## 2024-04-08 DIAGNOSIS — R20.2 PARESTHESIA OF RIGHT ARM: Primary | ICD-10-CM

## 2024-04-08 PROCEDURE — 99204 OFFICE O/P NEW MOD 45 MIN: CPT | Performed by: STUDENT IN AN ORGANIZED HEALTH CARE EDUCATION/TRAINING PROGRAM

## 2024-04-08 ASSESSMENT — PAIN SCALES - GENERAL: PAINLEVEL: SEVERE PAIN (6)
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
## 2024-04-08 NOTE — PATIENT INSTRUCTIONS
~Spine Center Scheduling #[phone].  ~Please call our [facility] Spine Nurse Navigation #[phone] with any questions or concerns about your treatment plan, if symptoms worsen and you would like to be seen urgently, or if you have problems controlling bladder and bowel function.  ~For any future flareups or new symptoms, recommend follow-up in clinic or contact the nurse navigator line.  ~Please note that any My Chart messages may take multiple days for a response due to the high volume of patients seen in clinic.  Anything sent Friday night or after will be answered the following week when able.     Imaging has been ordered. Radiology will call you to schedule. Please call below if you do not hear from them in the next couple of days.     [facility] Radiology Scheduling    Please call [phone] to schedule your image(s) (select option #1). There are 3 different locations, see below.     [place] [phone]    [facility] - [place]  [address], [address]   [place] [address]    [address] [address]     EMG Clinic - call [phone] to schedule.

## 2024-04-09 ENCOUNTER — PATIENT OUTREACH (OUTPATIENT)
Dept: CARE COORDINATION | Facility: CLINIC | Age: 24
End: 2024-04-09

## 2024-04-09 NOTE — PROGRESS NOTES
Clinic Care Coordination Contact  [facility]/Voicemail    Clinical Data: Care Coordinator Outreach    Outreach Documentation Number of Outreach Attempt   4/9/2024  11:25 AM 1     Left message on patient's voicemail with call back information and requested return call.    Plan: Care Coordinator will try to reach patient again in 10 business days.    Sharmaine Agarwal  Critical access hospital Health Worker  [facility] Care Coordination   [place], [facility] [place], [facility]  Office: [phone]

## 2024-04-10 ENCOUNTER — OFFICE VISIT (OUTPATIENT)
Dept: PHYSICAL MEDICINE AND REHAB | Facility: CLINIC | Age: 24
End: 2024-04-10
Attending: STUDENT IN AN ORGANIZED HEALTH CARE EDUCATION/TRAINING PROGRAM
Payer: COMMERCIAL

## 2024-04-10 DIAGNOSIS — M54.12 CERVICAL RADICULOPATHY: ICD-10-CM

## 2024-04-10 DIAGNOSIS — G89.29 CHRONIC NECK PAIN: ICD-10-CM

## 2024-04-10 DIAGNOSIS — M54.2 CHRONIC NECK PAIN: ICD-10-CM

## 2024-04-10 DIAGNOSIS — R20.2 PARESTHESIA OF RIGHT ARM: ICD-10-CM

## 2024-04-10 PROCEDURE — 95909 NRV CNDJ TST 5-6 STUDIES: CPT | Performed by: PHYSICAL MEDICINE & REHABILITATION

## 2024-04-10 PROCEDURE — 95886 MUSC TEST DONE W/N TEST COMP: CPT | Mod: RT | Performed by: PHYSICAL MEDICINE & REHABILITATION

## 2024-04-10 NOTE — LETTER
4/10/2024         RE: Girish Calderón  [address] [facility] [address]        Dear Colleague,    Thank you for referring your patient, Girish Calderón, to the [facility]ERY. Please see a copy of my visit note below.    [facility]  [address]  [place] [address]  Office: [phone] Fax: [phone]    Electromyography and Nerve Conduction Study Report        Indication: Patient presents at the request Dr. Gualberto Russell for right upper extremity EMG.  She has diffuse right arm pain and a sense of weakness for several years.  She is right-handed.  On exam she has normal sensation to light touch of the upper extremities, 2+ bicep, tricep, brachioradialis reflexes with negative Neel's, and normal muscle strength throughout the major muscle groups of the bilateral upper extremities.  She is hypermobile.        Pt Exam Discussion (Communication Barriers):  Electromyography and nerve conduction testing, including associated discomfort, risks, benefits, and alternatives was discussed with the patient prior to the procedure.  No learning/ communication barriers; patient verbalized understanding of procedure.  Informed consent was obtained.           Pt Assessment:  Testing was successfully completed; patient tolerated testing well.       Blood Thinners: None Skin Temperature: Warmed 32.2                     EMG/NCS  results:     Nerve Conduction Studies  Motor Sites      Segment Distal Latency Neg. Amp CV F-Latency F-Estimate Comment   Site  (ms) (mV) (m/s) (ms) (ms)    Right Median (APB) Motor   Wrist Wrist-APB 3.2 13.7       Elbow Elbow-Wrist 6.7 13.3 61      Right Ulnar (ADM) Motor   Wrist  2.4 12.1       Bel Elbow Bel Elbow-Wrist 5.2 11.8 66      Ab Elbow Ab Elbow-Wrist 7.0 11.3 68        Sensory Sites      Onset Lat Peak Lat Amp CV Comment   Site (ms) (ms) ( V) (m/s)    Right Median Anti Sensory   Wrist-Dig II 2.1 2.7 40  62    Right Median-Ulnar Palmar Sensory        Median   Palm-Wrist 1.30 1.73 173 62         Ulnar   Palm-Wrist 1.10 1.63 56 73    Right Radial Sensory   Forearm-Wrist 1.60 2.0 59 63    Right Ulnar Anti Sensory   Wrist-Dig V 2.0 2.6 66 55        NCS Waveforms:    Motor         Sensory                  Electromyography     Side Muscle Nerve Root Ins Act Fibs Psw Fasc Recrt Dur Amp Poly Comment   Right Deltoid Axillary C5-6 Nml Nml Nml Nml Nml Nml Nml 0    Right Triceps Radial C6-7-8 Nml Nml Nml Nml Nml Nml Nml 0    Right PronatorTeres Median C6-7 Nml Nml Nml Nml Nml Nml Nml 0    Right 1stDorInt Ulnar C8-T1 Nml Nml Nml Nml Nml Nml Nml 0    Right Abd Poll Brev Median C8-T1 Nml Nml Nml Nml Nml Nml Nml 0        Comment NCS: Normal study  1.  Normal nerve conduction studies right upper extremity.  This includes normal right median ulnar and radial SNAPs, median and ulnar transcarpal studies, and median and ulnar CMAP's.    Comment EMG: Normal study  1.  Normal needle EMG right upper extremity.    Interpretation: Normal study    1. There is no electrodiagnostic evidence of cervical radiculopathy, brachial plexopathy, or focal neuropathy in the right upper extremity.    The testing was completed in its entirety by the physician.       It was our pleasure caring for your patient today, if there any questions or concerns please do not hesitate to contact us.    Again, thank you for allowing me to participate in the care of your patient.        Sincerely,        Ata Glover DO

## 2024-04-10 NOTE — PATIENT INSTRUCTIONS
Thank you for choosing the [facility] for your EMG testing.    The ordering provider will receive your final EMG results within the next few days.  Please follow up with your provider for the results and further treatment recommendations.

## 2024-04-10 NOTE — PROGRESS NOTES
[facility]  [address] [place] [address] [address]  [place] [address]  Office: [phone] Fax: [phone]    Electromyography and Nerve Conduction Study Report        Indication: Patient presents at the request Dr. Gualberto Russell for right upper extremity EMG.  She has diffuse right arm pain and a sense of weakness for several years.  She is right-handed.  On exam she has normal sensation to light touch of the upper extremities, 2+ bicep, tricep, brachioradialis reflexes with negative Neel's, and normal muscle strength throughout the major muscle groups of the bilateral upper extremities.  She is hypermobile.        Pt Exam Discussion (Communication Barriers):  Electromyography and nerve conduction testing, including associated discomfort, risks, benefits, and alternatives was discussed with the patient prior to the procedure.  No learning/ communication barriers; patient verbalized understanding of procedure.  Informed consent was obtained.           Pt Assessment:  Testing was successfully completed; patient tolerated testing well.       Blood Thinners: None Skin Temperature: Warmed 32.2                     EMG/NCS  results:     Nerve Conduction Studies  Motor Sites      Segment Distal Latency Neg. Amp CV F-Latency F-Estimate Comment   Site  (ms) (mV) (m/s) (ms) (ms)    Right Median (APB) Motor   Wrist Wrist-APB 3.2 13.7       Elbow Elbow-Wrist 6.7 13.3 61      Right Ulnar (ADM) Motor   Wrist  2.4 12.1       Bel Elbow Bel Elbow-Wrist 5.2 11.8 66      Ab Elbow Ab Elbow-Wrist 7.0 11.3 68        Sensory Sites      Onset Lat Peak Lat Amp CV Comment   Site (ms) (ms) ( V) (m/s)    Right Median Anti Sensory   Wrist-Dig II 2.1 2.7 40 62    Right Median-Ulnar Palmar Sensory        Median   Palm-Wrist 1.30 1.73 173 62         Ulnar   Palm-Wrist 1.10 1.63 56 73    Right Radial Sensory   Forearm-Wrist 1.60 2.0 59 63    Right Ulnar Anti Sensory   Wrist-Dig V 2.0 2.6 66 55        NCS  Waveforms:    Motor         Sensory                  Electromyography     Side Muscle Nerve Root Ins Act Fibs Psw Fasc Recrt Dur Amp Poly Comment   Right Deltoid Axillary C5-6 Nml Nml Nml Nml Nml Nml Nml 0    Right Triceps Radial C6-7-8 Nml Nml Nml Nml Nml Nml Nml 0    Right PronatorTeres Median C6-7 Nml Nml Nml Nml Nml Nml Nml 0    Right 1stDorInt Ulnar C8-T1 Nml Nml Nml Nml Nml Nml Nml 0    Right Abd Poll Brev Median C8-T1 Nml Nml Nml Nml Nml Nml Nml 0        Comment NCS: Normal study  1.  Normal nerve conduction studies right upper extremity.  This includes normal right median ulnar and radial SNAPs, median and ulnar transcarpal studies, and median and ulnar CMAP's.    Comment EMG: Normal study  1.  Normal needle EMG right upper extremity.    Interpretation: Normal study    1. There is no electrodiagnostic evidence of cervical radiculopathy, brachial plexopathy, or focal neuropathy in the right upper extremity.    The testing was completed in its entirety by the physician.       It was our pleasure caring for your patient today, if there any questions or concerns please do not hesitate to contact us.

## 2024-04-12 ENCOUNTER — TELEPHONE (OUTPATIENT)
Dept: PHYSICAL MEDICINE AND REHAB | Facility: CLINIC | Age: 24
End: 2024-04-12

## 2024-04-18 ENCOUNTER — PATIENT OUTREACH (OUTPATIENT)
Dept: CARE COORDINATION | Facility: CLINIC | Age: 24
End: 2024-04-18

## 2024-04-18 NOTE — PROGRESS NOTES
Clinic Care Coordination Contact    Situation: Patient chart reviewed by care coordinator.    Background: Patient actively enrolled in care coordination.     Assessment: CHW attempted to call patient on 4/9/2024 and left a message for call back.     Plan/Recommendations: RN CC will chart review after CHW's next outreach attempt.     Nicolle Uriostegui RN Care Coordination   [facility]  Email: [email or web address]  Phone: [phone]

## 2024-04-21 ENCOUNTER — MYC REFILL (OUTPATIENT)
Dept: FAMILY MEDICINE | Facility: CLINIC | Age: 24
End: 2024-04-21

## 2024-04-21 DIAGNOSIS — F90.2 ATTENTION DEFICIT HYPERACTIVITY DISORDER (ADHD), COMBINED TYPE: ICD-10-CM

## 2024-04-22 RX ORDER — DEXTROAMPHETAMINE SACCHARATE, AMPHETAMINE ASPARTATE, DEXTROAMPHETAMINE SULFATE AND AMPHETAMINE SULFATE 7.5; 7.5; 7.5; 7.5 MG/1; MG/1; MG/1; MG/1
30 TABLET ORAL DAILY
Qty: 30 TABLET | Refills: 0 | Status: SHIPPED | OUTPATIENT
Start: 2024-04-22 | End: 2024-05-21

## 2024-04-24 ENCOUNTER — PATIENT OUTREACH (OUTPATIENT)
Dept: CARE COORDINATION | Facility: CLINIC | Age: 24
End: 2024-04-24

## 2024-04-24 NOTE — PROGRESS NOTES
Clinic Care Coordination Contact  [facility]/Voicemail    Clinical Data: Care Coordinator Outreach    Outreach Documentation Number of Outreach Attempt   4/9/2024  11:25 AM 1   4/24/2024   9:15 AM 2     Left message on patient's voicemail with call back information and requested return call.    Plan: Care Coordinator will send unable to contact letter with care coordinator contact information via [employer]. Care Coordinator will try to reach patient again in 10 business days.    Sharmaine Agarwal  Community Health Worker  [facility] Care Coordination   [place], [place], [place], [place] and [facility]  Office: [phone]

## 2024-05-03 ENCOUNTER — MYC REFILL (OUTPATIENT)
Dept: FAMILY MEDICINE | Facility: CLINIC | Age: 24
End: 2024-05-03
Payer: COMMERCIAL

## 2024-05-03 DIAGNOSIS — F90.2 ATTENTION DEFICIT HYPERACTIVITY DISORDER (ADHD), COMBINED TYPE: ICD-10-CM

## 2024-05-03 RX ORDER — DEXTROAMPHETAMINE SACCHARATE, AMPHETAMINE ASPARTATE, DEXTROAMPHETAMINE SULFATE AND AMPHETAMINE SULFATE 2.5; 2.5; 2.5; 2.5 MG/1; MG/1; MG/1; MG/1
10 TABLET ORAL DAILY
Qty: 30 TABLET | Refills: 0 | Status: SHIPPED | OUTPATIENT
Start: 2024-05-03 | End: 2024-05-21

## 2024-05-07 ENCOUNTER — PATIENT OUTREACH (OUTPATIENT)
Dept: CARE COORDINATION | Facility: CLINIC | Age: 24
End: 2024-05-07
Payer: COMMERCIAL

## 2024-05-07 NOTE — PROGRESS NOTES
Clinic Care Coordination Contact  [facility]/Voicemail    Trang Gallegos, RN to Me 5/7/24  2:11 PM  Okay to dis-enroll.  Thanks    Me to Trang Gallegos RN 5/7/24 12:44 PM  I have called patient x3 and sent 1 [facility] letter with no return call. Please review for dis enrollment.    Plan: Care Coordinator will send disenrollment letter with care coordinator contact information via [employer]. Care Coordinator will do no further outreaches at this time.    Sharmaine Agarwal  Community Health Worker  [facility]  Clinic Care Coordination   [place], [place], [place], [place] and [facility]  Office: [phone]

## 2024-05-07 NOTE — PROGRESS NOTES
Clinic Care Coordination Contact  [facility]/Voicemail    Clinical Data: Care Coordinator Outreach    Outreach Documentation Number of Outreach Attempt   4/9/2024  11:25 AM 1   4/24/2024   9:15 AM 2   5/7/2024  12:42 PM 3     Left message on patient's voicemail with call back information and requested return call.    Plan: Care Coordinator sent care coordination introduction UTC letter on 4/24/24 via [employer].     CHW sending chart to CCC RN to review for dis enrollment.  Sharmaine Agarwal  Community Health Worker  [facility] Care Coordination   [place], [facility], [place], [place], [place] and [facility]  Office: [phone]

## 2024-05-07 NOTE — LETTER
Dear Girish,    I have been unsuccessful in reaching you since our last contact. At this time the Care Coordination team will make no further attempts to reach you, however this does not change your ability to continue receiving care from your providers at your primary care clinic. If you need additional support from a care coordinator in the future please contact Sharmaine Agarwal at [phone].    All of us at [facility] are invested in your health and are here to assist you in meeting your goals.     Sincerely,    Sharmaine Agarwal  Community Health Worker  [facility] Care Coordination   [place]elena Banks, [facility], [facility]  Office: [phone]

## 2024-05-17 DIAGNOSIS — F41.9 ANXIETY: ICD-10-CM

## 2024-05-17 RX ORDER — FLUOXETINE 40 MG/1
40 CAPSULE ORAL DAILY
Qty: 90 CAPSULE | Refills: 0 | Status: SHIPPED | OUTPATIENT
Start: 2024-05-17

## 2024-05-21 ENCOUNTER — OFFICE VISIT (OUTPATIENT)
Dept: FAMILY MEDICINE | Facility: CLINIC | Age: 24
End: 2024-05-21
Payer: COMMERCIAL

## 2024-05-21 VITALS
RESPIRATION RATE: 16 BRPM | SYSTOLIC BLOOD PRESSURE: 122 MMHG | WEIGHT: 119 LBS | TEMPERATURE: 98 F | BODY MASS INDEX: 21.09 KG/M2 | HEART RATE: 111 BPM | OXYGEN SATURATION: 99 % | HEIGHT: 63 IN | DIASTOLIC BLOOD PRESSURE: 78 MMHG

## 2024-05-21 DIAGNOSIS — F33.1 MAJOR DEPRESSIVE DISORDER, RECURRENT EPISODE, MODERATE (H): ICD-10-CM

## 2024-05-21 DIAGNOSIS — F90.2 ATTENTION DEFICIT HYPERACTIVITY DISORDER (ADHD), COMBINED TYPE: ICD-10-CM

## 2024-05-21 DIAGNOSIS — Z00.00 ROUTINE GENERAL MEDICAL EXAMINATION AT A HEALTH CARE FACILITY: Primary | ICD-10-CM

## 2024-05-21 DIAGNOSIS — Z11.3 SCREENING FOR STDS (SEXUALLY TRANSMITTED DISEASES): ICD-10-CM

## 2024-05-21 PROCEDURE — 87491 CHLMYD TRACH DNA AMP PROBE: CPT | Performed by: FAMILY MEDICINE

## 2024-05-21 PROCEDURE — 99213 OFFICE O/P EST LOW 20 MIN: CPT | Mod: 25 | Performed by: FAMILY MEDICINE

## 2024-05-21 PROCEDURE — 87591 N.GONORRHOEAE DNA AMP PROB: CPT | Performed by: FAMILY MEDICINE

## 2024-05-21 PROCEDURE — 99395 PREV VISIT EST AGE 18-39: CPT | Performed by: FAMILY MEDICINE

## 2024-05-21 RX ORDER — COPPER 313.4 MG/1
1 INTRAUTERINE DEVICE INTRAUTERINE ONCE
COMMUNITY
Start: 2022-08-12

## 2024-05-21 RX ORDER — DEXTROAMPHETAMINE SACCHARATE, AMPHETAMINE ASPARTATE, DEXTROAMPHETAMINE SULFATE AND AMPHETAMINE SULFATE 2.5; 2.5; 2.5; 2.5 MG/1; MG/1; MG/1; MG/1
10-20 TABLET ORAL DAILY
Qty: 60 TABLET | Refills: 0 | Status: SHIPPED | OUTPATIENT
Start: 2024-05-21

## 2024-05-21 RX ORDER — DEXTROAMPHETAMINE SACCHARATE, AMPHETAMINE ASPARTATE MONOHYDRATE, DEXTROAMPHETAMINE SULFATE AND AMPHETAMINE SULFATE 7.5; 7.5; 7.5; 7.5 MG/1; MG/1; MG/1; MG/1
30 CAPSULE, EXTENDED RELEASE ORAL DAILY
Qty: 30 CAPSULE | Refills: 0 | Status: SHIPPED | OUTPATIENT
Start: 2024-05-21 | End: 2024-06-20

## 2024-05-21 RX ORDER — DEXTROAMPHETAMINE SACCHARATE, AMPHETAMINE ASPARTATE MONOHYDRATE, DEXTROAMPHETAMINE SULFATE AND AMPHETAMINE SULFATE 7.5; 7.5; 7.5; 7.5 MG/1; MG/1; MG/1; MG/1
30 CAPSULE, EXTENDED RELEASE ORAL DAILY
Qty: 30 CAPSULE | Refills: 0 | Status: SHIPPED | OUTPATIENT
Start: 2024-07-20 | End: 2024-08-19

## 2024-05-21 RX ORDER — DEXTROAMPHETAMINE SACCHARATE, AMPHETAMINE ASPARTATE MONOHYDRATE, DEXTROAMPHETAMINE SULFATE AND AMPHETAMINE SULFATE 7.5; 7.5; 7.5; 7.5 MG/1; MG/1; MG/1; MG/1
30 CAPSULE, EXTENDED RELEASE ORAL DAILY
Qty: 30 CAPSULE | Refills: 0 | Status: SHIPPED | OUTPATIENT
Start: 2024-06-20 | End: 2024-07-15

## 2024-05-21 SDOH — HEALTH STABILITY: PHYSICAL HEALTH: ON AVERAGE, HOW MANY MINUTES DO YOU ENGAGE IN EXERCISE AT THIS LEVEL?: 60 MIN

## 2024-05-21 SDOH — HEALTH STABILITY: PHYSICAL HEALTH: ON AVERAGE, HOW MANY DAYS PER WEEK DO YOU ENGAGE IN MODERATE TO STRENUOUS EXERCISE (LIKE A BRISK WALK)?: 5 DAYS

## 2024-05-21 ASSESSMENT — PATIENT HEALTH QUESTIONNAIRE - PHQ9
SUM OF ALL RESPONSES TO PHQ QUESTIONS 1-9: 12
SUM OF ALL RESPONSES TO PHQ QUESTIONS 1-9: 12
10. IF YOU CHECKED OFF ANY PROBLEMS, HOW DIFFICULT HAVE THESE PROBLEMS MADE IT FOR YOU TO DO YOUR WORK, TAKE CARE OF THINGS AT HOME, OR GET ALONG WITH OTHER PEOPLE: VERY DIFFICULT

## 2024-05-21 ASSESSMENT — SOCIAL DETERMINANTS OF HEALTH (SDOH): HOW OFTEN DO YOU GET TOGETHER WITH FRIENDS OR RELATIVES?: ONCE A WEEK

## 2024-05-21 NOTE — PROGRESS NOTES
Preventive Care Visit  [facility] BIBI Tellez MD, Family Medicine  May 21, 2024      Assessment & Plan     Routine general medical examination at a health care facility  - REVIEW OF HEALTH MAINTENANCE PROTOCOL ORDERS  - PRIMARY CARE FOLLOW-UP SCHEDULING; Future    Attention deficit hyperactivity disorder (ADHD), combined type  Switch back to 30mg XR (from IR)  - amphetamine-dextroamphetamine (ADDERALL XR) 30 MG 24 hr capsule; Take 1 capsule (30 mg) by mouth daily for 30 days  - amphetamine-dextroamphetamine (ADDERALL XR) 30 MG 24 hr capsule; Take 1 capsule (30 mg) by mouth daily for 30 days  - amphetamine-dextroamphetamine (ADDERALL XR) 30 MG 24 hr capsule; Take 1 capsule (30 mg) by mouth daily for 30 days  - amphetamine-dextroamphetamine (ADDERALL) 10 MG tablet; Take 1-2 tablets (10-20 mg) by mouth daily In the afternoon    Screening for STDs (sexually transmitted diseases)  - Chlamydia trachomatis/Neisseria gonorrhoeae by PCR- VAGINAL SELF-SWAB; Future  - Chlamydia trachomatis/Neisseria gonorrhoeae by PCR- VAGINAL SELF-SWAB    Major depressive disorder, recurrent episode, moderate (H)  Patient does not seem to be particularly well-controlled today.  She feels as though her Prozac is adequate and does not wish to take other medications.  I encouraged her to reach out to her therapist.            Counseling  Appropriate preventive services were discussed with this patient, including applicable screening as appropriate for fall prevention, nutrition, physical activity, Tobacco-use cessation, weight loss and cognition.  Checklist reviewing preventive services available has been given to the patient.  Reviewed patient's diet, addressing concerns and/or questions.   The patient's PHQ-9 score is consistent with moderate depression. She was provided with information regarding depression.           Giana Stout is a 24 year old, presenting for the following:  Physical (Fasting for labs  today) and Recheck Medication (Med Check- discuss Adderall dose)       Health Care Directive  Patient does not have a Health Care Directive or Living Will: Discussed advance care planning with patient; however, patient declined at this time.    SHANELL Stout is a very pleasant 24-year-old female who presents to the clinic today for complete physical.    She has had some difficulties in her life recently.  Her and her fiancé (dating 7 to 8 years) recently split up.  She has been dealing with depression.  She does have a therapist who unfortunately moved out of town and she has not been speaking to recently.  She has a new therapist who she is meeting tomorrow.  She thinks that this will be helpful.    She is currently taking Adderall 30 mg immediate release in the morning with a 10 mg immediate release in the afternoon.  She is hopeful to get back to the 30 mg extended release as this is what she was on previously.            5/21/2024   General Health   How would you rate your overall physical health? Good   Feel stress (tense, anxious, or unable to sleep) Very much   (!) STRESS CONCERN      5/21/2024   Nutrition   Three or more servings of calcium each day? (!) NO   Diet: Regular (no restrictions)   How many servings of fruit and vegetables per day? (!) 2-3   How many sweetened beverages each day? 0-1         5/21/2024   Exercise   Days per week of moderate/strenous exercise 5 days   Average minutes spent exercising at this level 60 min         5/21/2024   Social Factors   Frequency of gathering with friends or relatives Once a week   Worry food won't last until get money to buy more No   Food not last or not have enough money for food? No   Do you have housing?  Yes   Are you worried about losing your housing? Yes   Lack of transportation? No   Unable to get utilities (heat,electricity)? No   Want help with housing or utility concern? No   (!) HOUSING CONCERN PRESENT      5/21/2024   Dental   Dentist two times every  "year? Yes         5/21/2024   TB Screening   Were you born outside of the [place]? No       Today's PHQ-9 Score:       5/21/2024    10:02 AM   PHQ-9 SCORE   PHQ-9 Total Score MyChart 12 (Moderate depression)   PHQ-9 Total Score 12         5/21/2024   Substance Use   Alcohol more than 3/day or more than 7/wk Not Applicable   Do you use any other substances recreationally? (!) CANNABIS PRODUCTS     Social History     Tobacco Use    Smoking status: Never     Passive exposure: Yes    Smokeless tobacco: Never    Tobacco comments:     father smokes outside   Vaping Use    Vaping status: Former   Substance Use Topics    Alcohol use: Yes     Comment: occ.    Drug use: No             5/21/2024   One time HIV Screening   Previous HIV test? I don't know         5/21/2024   STI Screening   New sexual partner(s) since last STI/HIV test? (!) YES      History of abnormal Pap smear: No - age 21-29 PAP every 3 years recommended        7/19/2022    10:16 AM   PAP / HPV   PAP Negative for Intraepithelial Lesion or Malignancy (NILM)            5/21/2024   Contraception/Family Planning   Questions about contraception or family planning No        Reviewed and updated as needed this visit by Provider                    No past medical history on file.      Review of Systems  Constitutional, HEENT, cardiovascular, pulmonary, GI, , musculoskeletal, neuro, skin, endocrine and psych systems are negative, except as otherwise noted.     Objective    Exam  /78   Pulse 111   Temp 98  F (36.7  C) (Tympanic)   Resp 16   Ht 1.588 m (5' 2.5\")   Wt 54 kg (119 lb)   LMP 05/07/2024 (Within Days)   SpO2 99%   BMI 21.42 kg/m     Estimated body mass index is 21.42 kg/m  as calculated from the following:    Height as of this encounter: 1.588 m (5' 2.5\").    Weight as of this encounter: 54 kg (119 lb).    Physical Exam    Physical Exam:  General Appearance: Alert, cooperative, no distress, appears stated age   Head: Normocephalic, without obvious " abnormality, atraumatic  Eyes: PERRL, conjunctiva/corneas clear, EOM's intact   Ears: Normal TM's and external ear canals, both ears  Nose:Nares normal, septum midline,mucosa normal, no drainage    Throat:Lips, mucosa, and tongue normal; teeth and gums normal  Neck: Supple, symmetrical, trachea midline, no adenopathy;  thyroid: not enlarged, symmetric, no tenderness/mass/nodules  Back: Symmetric, no curvature, ROM normal,  Lungs: Clear to auscultation bilaterally, respirations unlabored  Breasts: No breast masses, tenderness, asymmetry, or nipple discharge.  Heart: Regular rate and rhythm, S1 and S2 normal, no murmur, rub, or gallop  Abdomen: Soft, non-tender, bowel sounds active all four quadrants,  no masses, no organomegaly  Extremities: Extremities normal, atraumatic, no cyanosis or edema  Skin: Skin color, texture, turgor normal, no rashes or lesions  Lymph nodes: Cervical, supraclavicular, and axillary nodes normal and   Neurologic: Normal          Signed Electronically by: Kerline Tellez MD

## 2024-05-21 NOTE — COMMUNITY RESOURCES LIST (ENGLISH)
May 21, 2024           YOUR PERSONALIZED LIST OF SERVICES & PROGRAMS           & SHELTER    Case Management      [facility]  [address] [place] [address] (Distance: 24.7 miles)  Phone: [phone]  Language: English  Fee: Free  Accessibility: Translation services      [facility] Homeless Resources and Housing Information - Housing search assistance  [address] [place] [address] (Distance: 3.4 miles)  Language: English  Fee: Free  Accessibility: Translation services      [employer] - Housing Stabilization Services  Phone: (437) 783-7159  Website: https://homebasemn.com/  Language: English  Hours: Mon 8:00 AM - 4:00 PM Tue 8:00 AM - 4:00 PM Wed 8:00 AM - 4:00 PM Thu 8:00 AM - 4:00 PM Fri 8:00 AM - 4:00 PM  Fee: Free  Accessibility: Blind accommodation, Deaf or hard of hearing  Transportation Options: Free transportation    Payment Assistance      [facility] Rent payment assistance  [address] [place] [address] (Distance: 3.4 miles)  Phone: [phone]) [phone]  Language: English  Fee: Free  Accessibility: Translation services      [employer] - Rent and mortgage payment assistance  [address] [place] [address] (Distance: 24.7 miles)  Phone: (161) 875-9225  Website: https://xzxpscwll1mksbfsw.org/  Language: English  Fee: Free      [employer] - [employer] the [employer]  Phone: (955) 975-8610  Website: https://www.xpl65-vmljdxhboqxvab.org/programs.html  Language: English  Hours: Mon 7:00 AM - 7:00 PM Tue 7:00 AM - 7:00 PM Wed 7:00 AM - 7:00 PM Thu 7:00 AM - 7:00 PM Fri 7:00 AM - 7:00 PM  Fee: Free    Mediation & Eviction Prevention      Line - Tenant Rights / Eviction Prevention  Website: https://[employer] [facility].org/d-grvx-zb-/  Language: English, Kiswahili      [facility] and [place] - [employer] [facility]  Phone: (457) 286-8506  Website: https://www.[employer].Via6/  Language: English, Yanivong,  Oromo, Beninese, Urdu  Hours: Mon 9:00 AM - 5:00 PM Tue 9:00 AM - 5:00 PM Wed 9:00 AM - 5:00 PM Thu 9:00 AM - 5:00 PM Fri 9:00 AM - 5:00 PM  Fee: Insurance  Accessibility: Blind accommodation, Deaf or hard of hearing, Translation services  Transportation Options: Free transportation      [employer] - Housing Stabilization Services  Phone: (170) 924-8340  Website: https://[employer]/Housing-Stabilization.html  Language: English  Hours: Mon 9:00 AM - 5:00 PM Tue 9:00 AM - 5:00 PM Wed 9:00 AM - 5:00 PM Thu 9:00 AM - 5:00 PM Fri 9:00 AM - 5:00 PM  Fee: Insurance  Accessibility: Deaf or hard of hearing, Translation services               IMPORTANT NUMBERS & WEBSITES        Emergency Services  911  .   [facility]  211 http://211unitedway.org  .   Poison Control  (496) 585-9491 http://[place]poison.org http://wisconsinpoison.org  .     Suicide and Crisis Lifeline  988 http://988lifeline.org  .   Childhelp [place] Child Abuse Hotline  811.407.9160 http://Childhelphotline.org   .   National Sexual Assault Hotline  (699) 950-1682 (HOPE) http://Videodeclasse.com.org   .     National Runaway Safeline  (552) 893-9913 (RUNAWAY) http://2thelooruTabbedOut.[employer]  .   Pregnancy & Postpartum Support  Call/text 527-013-4604  [place]: http://ppsupportmn.org  [place]: http://[employer].com/wi  .   Substance Abuse National Helpline ([facility])  327-276-HELP (8908) http://Findtreatment.gov   .                DISCLAIMER: These resources have been generated via the [employer]. [employer] does not endorse any service providers mentioned in this resource list. [employer] does not guarantee that the services mentioned in this resource list will be available to you or will improve your health or wellness.    [facility]

## 2024-05-21 NOTE — PATIENT INSTRUCTIONS
"Preventive Care Advice   This is general advice we often give to help people stay healthy. Your care team may have specific advice just for you. Please talk to your care team about your own preventive care needs.  Lifestyle  Exercise at least 150 minutes each week (30 minutes a day, 5 days a week).  Do muscle strengthening activities 2 days a week. These help control your weight and prevent disease.  No smoking.  Wear sunscreen to prevent skin cancer.  Have your home tested for radon every 2 to 5 years. Radon is a colorless, odorless gas that can harm your lungs. To learn more, go to www.health.[facility].mn. and search for \"Radon in Homes.\"  Keep guns unloaded and locked up in a safe place like a safe or gun vault, or, use a gun lock and hide the keys. Always lock away bullets separately. To learn more, visit [employer].[place].gov and search for \"safe gun storage.\"  Nutrition  Eat 5 or more servings of fruits and vegetables each day.  Try wheat bread, brown rice and whole grain pasta (instead of white bread, rice, and pasta).  Get enough calcium and vitamin D. Check the label on foods and aim for 100% of the RDA (recommended daily allowance).  Regular exams  Have a dental exam and cleaning every 6 months.  See your health care team every year to talk about:  Any changes in your health.  Any medicines your care team has prescribed.  Preventive care, family planning, and ways to prevent chronic diseases.  Shots (vaccines)   HPV shots (up to age 26), if you've never had them before.  Hepatitis B shots (up to age 59), if you've never had them before.  COVID-19 shot: Get this shot when it's due.  Flu shot: Get a flu shot every year.  Tetanus shot: Get a tetanus shot every 10 years.  Pneumococcal, hepatitis A, and RSV shots: Ask your care team if you need these based on your risk.  Shingles shot (for age 50 and up).  General health tests  Diabetes screening:  Starting at age 35, Get screened for diabetes at least every 3 years.  If " you are younger than age 35, ask your care team if you should be screened for diabetes.  Cholesterol test: At age 39, start having a cholesterol test every 5 years, or more often if advised.  Bone density scan (DEXA): At age 50, ask your care team if you should have this scan for osteoporosis (brittle bones).  Hepatitis C: Get tested at least once in your life.  Abdominal aortic aneurysm screening: Talk to your doctor about having this screening if you:  Have ever smoked; and  Are biologically male; and  Are between the ages of 65 and 75.  STIs (sexually transmitted infections)  Before age 24: Ask your care team if you should be screened for STIs.  After age 24: Get screened for STIs if you're at risk. You are at risk for STIs (including HIV) if:  You are sexually active with more than one person.  You don't use condoms every time.  You or a partner was diagnosed with a sexually transmitted infection.  If you are at risk for HIV, ask about PrEP medicine to prevent HIV.  Get tested for HIV at least once in your life, whether you are at risk for HIV or not.  Cancer screening tests  Cervical cancer screening: If you have a cervix, begin getting regular cervical cancer screening tests at age 21. Most people who have regular screenings with normal results can stop after age 65. Talk about this with your provider.  Breast cancer scan (mammogram): If you've ever had breasts, begin having regular mammograms starting at age 40. This is a scan to check for breast cancer.  Colon cancer screening: It is important to start screening for colon cancer at age 45.  Have a colonoscopy test every 10 years (or more often if you're at risk) Or, ask your provider about stool tests like a FIT test every year or Cologuard test every 3 years.  To learn more about your testing options, visit: [email or web address].  For help making a decision, visit: [email or web address].  Prostate cancer screening test: If you have a prostate and are age 55  to 69, ask your provider if you would benefit from a yearly prostate cancer screening test.  Lung cancer screening: If you are a current or former smoker age 50 to 80, ask your care team if ongoing lung cancer screenings are right for you.  For informational purposes only. Not to replace the advice of your health care provider. Copyright   2023 [employer]. All rights reserved. Clinically reviewed by the [facility]. Carlyp[employer] 730297 - REV 04/24.    Learning About Stress  What is stress?     Stress is your body's response to a hard situation. Your body can have a physical, emotional, or mental response. Stress is a fact of life for most people, and it affects everyone differently. What causes stress for you may not be stressful for someone else.  A lot of things can cause stress. You may feel stress when you go on a job interview, take a test, or run a race. This kind of short-term stress is normal and even useful. It can help you if you need to work hard or react quickly. For example, stress can help you finish an important job on time.  Long-term stress is caused by ongoing stressful situations or events. Examples of long-term stress include long-term health problems, ongoing problems at work, or conflicts in your family. Long-term stress can harm your health.  How does stress affect your health?  When you are stressed, your body responds as though you are in danger. It makes hormones that speed up your heart, make you breathe faster, and give you a burst of energy. This is called the fight-or-flight stress response. If the stress is over quickly, your body goes back to normal and no harm is done.  But if stress happens too often or lasts too long, it can have bad effects. Long-term stress can make you more likely to get sick, and it can make symptoms of some diseases worse. If you tense up when you are stressed, you may develop neck, shoulder, or low back pain. Stress is  linked to high blood pressure and heart disease.  Stress also harms your emotional health. It can make you holder, tense, or depressed. Your relationships may suffer, and you may not do well at work or school.  What can you do to manage stress?  You can try these things to help manage stress:   Do something active. Exercise or activity can help reduce stress. Walking is a great way to get started. Even everyday activities such as housecleaning or yard work can help.  Try yoga or citlaly chi. These techniques combine exercise and meditation. You may need some training at first to learn them.  Do something you enjoy. For example, listen to music or go to a movie. Practice your hobby or do volunteer work.  Meditate. This can help you relax, because you are not worrying about what happened before or what may happen in the future.  Do guided imagery. Imagine yourself in any setting that helps you feel calm. You can use online videos, books, or a teacher to guide you.  Do breathing exercises. For example:  From a standing position, bend forward from the waist with your knees slightly bent. Let your arms dangle close to the floor.  Breathe in slowly and deeply as you return to a standing position. Roll up slowly and lift your head last.  Hold your breath for just a few seconds in the standing position.  Breathe out slowly and bend forward from the waist.  Let your feelings out. Talk, laugh, cry, and express anger when you need to. Talking with supportive friends or family, a counselor, or a ralph leader about your feelings is a healthy way to relieve stress. Avoid discussing your feelings with people who make you feel worse.  Write. It may help to write about things that are bothering you. This helps you find out how much stress you feel and what is causing it. When you know this, you can find better ways to cope.  What can you do to prevent stress?  You might try some of these things to help prevent stress:  Manage your time.  "This helps you find time to do the things you want and need to do.  Get enough sleep. Your body recovers from the stresses of the day while you are sleeping.  Get support. Your family, friends, and community can make a difference in how you experience stress.  Limit your news feed. Avoid or limit time on social media or news that may make you feel stressed.  Do something active. Exercise or activity can help reduce stress. Walking is a great way to get started.  Where can you learn more?  Go to https://www.[employer].net/patiented  Enter N032 in the search box to learn more about \"Learning About Stress.\"  Current as of: October 24, 2023               Content Version: 14.0    1936-1798 [employer].   Care instructions adapted under license by your healthcare professional. If you have questions about a medical condition or this instruction, always ask your healthcare professional. [employer] disclaims any warranty or liability for your use of this information.      Learning About Depression Screening  What is depression screening?  Depression screening is a way to see if you have depression symptoms. It may be done by a doctor or counselor. It's often part of a routine checkup. That's because your mental health is just as important as your physical health.  Depression is a mental health condition that affects how you feel, think, and act. You may:  Have less energy.  Lose interest in your daily activities.  Feel sad and grouchy for a long time.  Depression is very common. It affects people of all ages.  Many things can lead to depression. Some people become depressed after they have a stroke or find out they have a major illness like cancer or heart disease. The death of a loved one or a breakup may lead to depression. It can run in families. Most experts believe that a combination of inherited genes and stressful life events can cause it.  What happens during screening?  You may be asked to " "fill out a form about your depression symptoms. You and the doctor will discuss your answers. The doctor may ask you more questions to learn more about how you think, act, and feel.  What happens after screening?  If you have symptoms of depression, your doctor will talk to you about your options.  Doctors usually treat depression with medicines or counseling. Often, combining the two works best. Many people don't get help because they think that they'll get over the depression on their own. But people with depression may not get better unless they get treatment.  The cause of depression is not well understood. There may be many factors involved. But if you have depression, it's not your fault.  A serious symptom of depression is thinking about death or suicide. If you or someone you care about talks about this or about feeling hopeless, get help right away.  It's important to know that depression can be treated. Medicine, counseling, and self-care may help.  Where can you learn more?  Go to https://www.[employer].net/patiented  Enter T185 in the search box to learn more about \"Learning About Depression Screening.\"  Current as of: June 24, 2023               Content Version: 14.0    4288-5424 [employer].   Care instructions adapted under license by your healthcare professional. If you have questions about a medical condition or this instruction, always ask your healthcare professional. [employer] disclaims any warranty or liability for your use of this information.      Substance Use Disorder: Care Instructions  Overview     You can improve your life and health by stopping your use of alcohol or drugs. When you don't drink or use drugs, you may feel and sleep better. You may get along better with your family, friends, and coworkers. There are medicines and programs that can help with substance use disorder.  How can you care for yourself at home?  Here are some ways to help you stay sober " and prevent relapse.  If you have been given medicine to help keep you sober or reduce your cravings, be sure to take it exactly as prescribed.  Talk to your doctor about programs that can help you stop using drugs or drinking alcohol.  Do not keep alcohol or drugs in your home.  Plan ahead. Think about what you'll say if other people ask you to drink or use drugs. Try not to spend time with people who drink or use drugs.  Use the time and money spent on drinking or drugs to do something that's important to you.  Preventing a relapse  Have a plan to deal with relapse. Learn to recognize changes in your thinking that lead you to drink or use drugs. Get help before you start to drink or use drugs again.  Try to stay away from situations, friends, or places that may lead you to drink or use drugs.  If you feel the need to drink alcohol or use drugs again, seek help right away. Call a trusted friend or family member. Some people get support from organizations such as Narcotics Anonymous or mPATH or from treatment facilities.  If you relapse, get help as soon as you can. Some people make a plan with another person that outlines what they want that person to do for them if they relapse. The plan usually includes how to handle the relapse and who to notify in case of relapse.  Don't give up. Remember that a relapse doesn't mean that you have failed. Use the experience to learn the triggers that lead you to drink or use drugs. Then quit again. Recovery is a lifelong process. Many people have several relapses before they are able to quit for good.  Follow-up care is a key part of your treatment and safety. Be sure to make and go to all appointments, and call your doctor if you are having problems. It's also a good idea to know your test results and keep a list of the medicines you take.  When should you call for help?   Call 911  anytime you think you may need emergency care. For example, call if you or someone  "else:    Has overdosed or has withdrawal signs. Be sure to tell the emergency workers that you are or someone else is using or trying to quit using drugs. Overdose or withdrawal signs may include:  Losing consciousness.  Seizure.  Seeing or hearing things that aren't there (hallucinations).     Is thinking or talking about suicide or harming others.   Where to get help 24 hours a day, 7 days a week   If you or someone you know talks about suicide, self-harm, a mental health crisis, a substance use crisis, or any other kind of emotional distress, get help right away. You can:    Call the Suicide and Crisis Lifeline at 988.     Call 2-112-162-TALK (1-672.513.6240).     Text HOME to 323170 to access the Crisis Text Line.   Consider saving these numbers in your phone.  Go to [employer] for more information or to chat online.  Call your doctor now or seek immediate medical care if:    You are having withdrawal symptoms. These may include nausea or vomiting, sweating, shakiness, and anxiety.   Watch closely for changes in your health, and be sure to contact your doctor if:    You have a relapse.     You need more help or support to stop.   Where can you learn more?  Go to https://www.[employer].net/patiented  Enter H573 in the search box to learn more about \"Substance Use Disorder: Care Instructions.\"  Current as of: November 15, 2023               Content Version: 14.0    3218-4149 [employer].   Care instructions adapted under license by your healthcare professional. If you have questions about a medical condition or this instruction, always ask your healthcare professional. [employer] disclaims any warranty or liability for your use of this information.      Safer Sex: Care Instructions  Overview  Safer sex is a way to reduce your risk of getting a sexually transmitted infection (STI). It can also help prevent pregnancy.  Several products can help you practice safer sex and reduce your " chance of STIs. One of the best is a condom. There are internal and external condoms. You can use a special rubber sheet (dental dam) for protection during oral sex. Disposable gloves can keep your hands from touching blood, semen, or other body fluids that can carry infections.  Remember that birth control methods such as diaphragms, IUDs, foams, and birth control pills do not stop you from getting STIs.  Follow-up care is a key part of your treatment and safety. Be sure to make and go to all appointments, and call your doctor if you are having problems. It's also a good idea to know your test results and keep a list of the medicines you take.  How can you care for yourself at home?  Think about getting vaccinated to help prevent hepatitis A, hepatitis B, and human papillomavirus (HPV). They can be spread through sex.  Use a condom every time you have sex. Use an external condom, which goes on the penis. Or use an internal condom, which goes into the vagina or anus.  Make sure you use the right size external condom. A condom that's too small can break easily. A condom that's too big can slip off during sex.  Use a new condom each time you have sex. Be careful not to poke a hole in the condom when you open the wrapper.  Don't use an internal condom and an external condom at the same time.  Never use petroleum jelly (such as Vaseline), grease, hand lotion, baby oil, or anything with oil in it. These products can make holes in the condom.  After intercourse, hold the edge of the condom as you remove it. This will help keep semen from spilling out of the condom.  Do not have sex with anyone who has symptoms of an STI, such as sores on the genitals or mouth.  Do not drink a lot of alcohol or use drugs before sex.  Limit your sex partners. Sex with one partner who has sex only with you can reduce your risk of getting an STI.  Don't share sex toys. But if you do share them, use a condom and clean the sex toys between each  "use.  Talk to any partners before you have sex. Talk about what you feel comfortable with and whether you have any boundaries with sex. And find out if your partner or partners may be at risk for any STI. Keep in mind that a person may be able to spread an STI even if they do not have symptoms. You and any partners may want to get tested for STIs.  Where can you learn more?  Go to https://www.[employer].net/patiented  Enter B608 in the search box to learn more about \"Safer Sex: Care Instructions.\"  Current as of: November 27, 2023               Content Version: 14.0    7788-6183 [employer].   Care instructions adapted under license by your healthcare professional. If you have questions about a medical condition or this instruction, always ask your healthcare professional. [employer] disclaims any warranty or liability for your use of this information.      "

## 2024-05-22 LAB
C TRACH DNA SPEC QL PROBE+SIG AMP: NEGATIVE
N GONORRHOEA DNA SPEC QL NAA+PROBE: NEGATIVE

## 2024-06-25 ENCOUNTER — MYC REFILL (OUTPATIENT)
Dept: FAMILY MEDICINE | Facility: CLINIC | Age: 24
End: 2024-06-25
Payer: COMMERCIAL

## 2024-06-25 DIAGNOSIS — F90.2 ATTENTION DEFICIT HYPERACTIVITY DISORDER (ADHD), COMBINED TYPE: ICD-10-CM

## 2024-06-25 RX ORDER — DEXTROAMPHETAMINE SACCHARATE, AMPHETAMINE ASPARTATE, DEXTROAMPHETAMINE SULFATE AND AMPHETAMINE SULFATE 2.5; 2.5; 2.5; 2.5 MG/1; MG/1; MG/1; MG/1
10-20 TABLET ORAL DAILY
Qty: 60 TABLET | Refills: 0 | Status: CANCELLED | OUTPATIENT
Start: 2024-06-25

## 2024-06-26 RX ORDER — DEXTROAMPHETAMINE SACCHARATE, AMPHETAMINE ASPARTATE, DEXTROAMPHETAMINE SULFATE AND AMPHETAMINE SULFATE 5; 5; 5; 5 MG/1; MG/1; MG/1; MG/1
20 TABLET ORAL DAILY
Qty: 30 TABLET | Refills: 0 | Status: SHIPPED | OUTPATIENT
Start: 2024-06-26 | End: 2024-07-24

## 2024-07-15 ENCOUNTER — MYC REFILL (OUTPATIENT)
Dept: FAMILY MEDICINE | Facility: CLINIC | Age: 24
End: 2024-07-15
Payer: COMMERCIAL

## 2024-07-15 DIAGNOSIS — F90.2 ATTENTION DEFICIT HYPERACTIVITY DISORDER (ADHD), COMBINED TYPE: ICD-10-CM

## 2024-07-15 RX ORDER — DEXTROAMPHETAMINE SACCHARATE, AMPHETAMINE ASPARTATE MONOHYDRATE, DEXTROAMPHETAMINE SULFATE AND AMPHETAMINE SULFATE 7.5; 7.5; 7.5; 7.5 MG/1; MG/1; MG/1; MG/1
30 CAPSULE, EXTENDED RELEASE ORAL DAILY
Qty: 30 CAPSULE | Refills: 0 | Status: SHIPPED | OUTPATIENT
Start: 2024-07-15 | End: 2024-08-14

## 2024-07-24 ENCOUNTER — MYC REFILL (OUTPATIENT)
Dept: FAMILY MEDICINE | Facility: CLINIC | Age: 24
End: 2024-07-24
Payer: COMMERCIAL

## 2024-07-24 DIAGNOSIS — F90.2 ATTENTION DEFICIT HYPERACTIVITY DISORDER (ADHD), COMBINED TYPE: ICD-10-CM

## 2024-07-25 RX ORDER — DEXTROAMPHETAMINE SACCHARATE, AMPHETAMINE ASPARTATE, DEXTROAMPHETAMINE SULFATE AND AMPHETAMINE SULFATE 5; 5; 5; 5 MG/1; MG/1; MG/1; MG/1
20 TABLET ORAL DAILY
Qty: 30 TABLET | Refills: 0 | Status: SHIPPED | OUTPATIENT
Start: 2024-07-25 | End: 2024-09-03

## 2024-07-25 NOTE — TELEPHONE ENCOUNTER
Pending Prescriptions:                       Disp   Refills    amphetamine-dextroamphetamine (ADDERALL) *30 tab*0            Sig: Take 1 tablet (20 mg) by mouth daily    Routing refill request to provider for review/approval because:  Drug not on the G refill protocol         Yonathan Cosme RN

## 2024-08-14 ENCOUNTER — MYC REFILL (OUTPATIENT)
Dept: FAMILY MEDICINE | Facility: CLINIC | Age: 24
End: 2024-08-14
Payer: COMMERCIAL

## 2024-08-14 DIAGNOSIS — F90.2 ATTENTION DEFICIT HYPERACTIVITY DISORDER (ADHD), COMBINED TYPE: ICD-10-CM

## 2024-08-15 RX ORDER — DEXTROAMPHETAMINE SACCHARATE, AMPHETAMINE ASPARTATE MONOHYDRATE, DEXTROAMPHETAMINE SULFATE AND AMPHETAMINE SULFATE 7.5; 7.5; 7.5; 7.5 MG/1; MG/1; MG/1; MG/1
30 CAPSULE, EXTENDED RELEASE ORAL DAILY
Qty: 30 CAPSULE | Refills: 0 | Status: SHIPPED | OUTPATIENT
Start: 2024-08-15

## 2024-08-15 NOTE — TELEPHONE ENCOUNTER
Pending Prescriptions:                       Disp   Refills    amphetamine-dextroamphetamine (ADDERALL X*30 cap*0            Sig: Take 1 capsule (30 mg) by mouth daily    Routing refill request to provider for review/approval because:  Drug not on the G refill protocol       Yonathan Cosme RN

## 2024-09-03 ENCOUNTER — MYC REFILL (OUTPATIENT)
Dept: FAMILY MEDICINE | Facility: CLINIC | Age: 24
End: 2024-09-03
Payer: COMMERCIAL

## 2024-09-03 DIAGNOSIS — F90.2 ATTENTION DEFICIT HYPERACTIVITY DISORDER (ADHD), COMBINED TYPE: ICD-10-CM

## 2024-09-03 RX ORDER — DEXTROAMPHETAMINE SACCHARATE, AMPHETAMINE ASPARTATE, DEXTROAMPHETAMINE SULFATE AND AMPHETAMINE SULFATE 5; 5; 5; 5 MG/1; MG/1; MG/1; MG/1
20 TABLET ORAL DAILY
Qty: 30 TABLET | Refills: 0 | Status: SHIPPED | OUTPATIENT
Start: 2024-09-03

## 2024-10-23 ENCOUNTER — MYC REFILL (OUTPATIENT)
Dept: FAMILY MEDICINE | Facility: CLINIC | Age: 24
End: 2024-10-23
Payer: COMMERCIAL

## 2024-10-23 DIAGNOSIS — F90.2 ATTENTION DEFICIT HYPERACTIVITY DISORDER (ADHD), COMBINED TYPE: ICD-10-CM

## 2024-10-23 RX ORDER — DEXTROAMPHETAMINE SACCHARATE, AMPHETAMINE ASPARTATE, DEXTROAMPHETAMINE SULFATE AND AMPHETAMINE SULFATE 5; 5; 5; 5 MG/1; MG/1; MG/1; MG/1
20 TABLET ORAL DAILY
Qty: 30 TABLET | Refills: 0 | Status: SHIPPED | OUTPATIENT
Start: 2024-10-23

## 2024-11-05 ENCOUNTER — MYC REFILL (OUTPATIENT)
Dept: FAMILY MEDICINE | Facility: CLINIC | Age: 24
End: 2024-11-05
Payer: COMMERCIAL

## 2024-11-05 DIAGNOSIS — F90.2 ATTENTION DEFICIT HYPERACTIVITY DISORDER (ADHD), COMBINED TYPE: ICD-10-CM

## 2024-11-05 RX ORDER — DEXTROAMPHETAMINE SACCHARATE, AMPHETAMINE ASPARTATE MONOHYDRATE, DEXTROAMPHETAMINE SULFATE AND AMPHETAMINE SULFATE 7.5; 7.5; 7.5; 7.5 MG/1; MG/1; MG/1; MG/1
30 CAPSULE, EXTENDED RELEASE ORAL DAILY
Qty: 30 CAPSULE | Refills: 0 | Status: SHIPPED | OUTPATIENT
Start: 2024-11-05

## 2024-11-05 NOTE — TELEPHONE ENCOUNTER
Requested Prescriptions   Pending Prescriptions Disp Refills    amphetamine-dextroamphetamine (ADDERALL XR) 30 MG 24 hr capsule 30 capsule 0     Sig: Take 1 capsule (30 mg) by mouth daily.       There is no refill protocol information for this order

## 2024-12-07 ENCOUNTER — MYC REFILL (OUTPATIENT)
Dept: FAMILY MEDICINE | Facility: CLINIC | Age: 24
End: 2024-12-07
Payer: COMMERCIAL

## 2024-12-07 DIAGNOSIS — F90.2 ATTENTION DEFICIT HYPERACTIVITY DISORDER (ADHD), COMBINED TYPE: ICD-10-CM

## 2024-12-09 RX ORDER — DEXTROAMPHETAMINE SACCHARATE, AMPHETAMINE ASPARTATE MONOHYDRATE, DEXTROAMPHETAMINE SULFATE AND AMPHETAMINE SULFATE 7.5; 7.5; 7.5; 7.5 MG/1; MG/1; MG/1; MG/1
30 CAPSULE, EXTENDED RELEASE ORAL DAILY
Qty: 30 CAPSULE | Refills: 0 | Status: SHIPPED | OUTPATIENT
Start: 2024-12-09

## 2024-12-22 ENCOUNTER — MYC REFILL (OUTPATIENT)
Dept: FAMILY MEDICINE | Facility: CLINIC | Age: 24
End: 2024-12-22
Payer: COMMERCIAL

## 2024-12-22 DIAGNOSIS — F90.2 ATTENTION DEFICIT HYPERACTIVITY DISORDER (ADHD), COMBINED TYPE: ICD-10-CM

## 2024-12-23 RX ORDER — DEXTROAMPHETAMINE SACCHARATE, AMPHETAMINE ASPARTATE, DEXTROAMPHETAMINE SULFATE AND AMPHETAMINE SULFATE 5; 5; 5; 5 MG/1; MG/1; MG/1; MG/1
20 TABLET ORAL DAILY
Qty: 30 TABLET | Refills: 0 | Status: SHIPPED | OUTPATIENT
Start: 2024-12-23

## 2025-02-02 ENCOUNTER — MYC REFILL (OUTPATIENT)
Dept: FAMILY MEDICINE | Facility: CLINIC | Age: 25
End: 2025-02-02
Payer: COMMERCIAL

## 2025-02-02 DIAGNOSIS — F90.2 ATTENTION DEFICIT HYPERACTIVITY DISORDER (ADHD), COMBINED TYPE: ICD-10-CM

## 2025-02-03 RX ORDER — DEXTROAMPHETAMINE SACCHARATE, AMPHETAMINE ASPARTATE, DEXTROAMPHETAMINE SULFATE AND AMPHETAMINE SULFATE 5; 5; 5; 5 MG/1; MG/1; MG/1; MG/1
20 TABLET ORAL 3 TIMES DAILY
Qty: 90 TABLET | Refills: 0 | Status: SHIPPED | OUTPATIENT
Start: 2025-02-03

## 2025-03-03 ENCOUNTER — MYC REFILL (OUTPATIENT)
Dept: FAMILY MEDICINE | Facility: CLINIC | Age: 25
End: 2025-03-03
Payer: COMMERCIAL

## 2025-03-03 DIAGNOSIS — F90.2 ATTENTION DEFICIT HYPERACTIVITY DISORDER (ADHD), COMBINED TYPE: ICD-10-CM

## 2025-03-03 RX ORDER — DEXTROAMPHETAMINE SACCHARATE, AMPHETAMINE ASPARTATE, DEXTROAMPHETAMINE SULFATE AND AMPHETAMINE SULFATE 5; 5; 5; 5 MG/1; MG/1; MG/1; MG/1
20 TABLET ORAL 3 TIMES DAILY
Qty: 90 TABLET | Refills: 0 | Status: SHIPPED | OUTPATIENT
Start: 2025-03-03

## 2025-03-31 ENCOUNTER — MYC REFILL (OUTPATIENT)
Dept: FAMILY MEDICINE | Facility: CLINIC | Age: 25
End: 2025-03-31
Payer: COMMERCIAL

## 2025-03-31 DIAGNOSIS — F90.2 ATTENTION DEFICIT HYPERACTIVITY DISORDER (ADHD), COMBINED TYPE: ICD-10-CM

## 2025-03-31 RX ORDER — DEXTROAMPHETAMINE SACCHARATE, AMPHETAMINE ASPARTATE, DEXTROAMPHETAMINE SULFATE AND AMPHETAMINE SULFATE 5; 5; 5; 5 MG/1; MG/1; MG/1; MG/1
20 TABLET ORAL 3 TIMES DAILY
Qty: 90 TABLET | Refills: 0 | Status: SHIPPED | OUTPATIENT
Start: 2025-03-31

## 2025-04-21 ENCOUNTER — PATIENT OUTREACH (OUTPATIENT)
Dept: CARE COORDINATION | Facility: CLINIC | Age: 25
End: 2025-04-21
Payer: COMMERCIAL

## 2025-04-28 ENCOUNTER — MYC REFILL (OUTPATIENT)
Dept: FAMILY MEDICINE | Facility: CLINIC | Age: 25
End: 2025-04-28
Payer: COMMERCIAL

## 2025-04-28 DIAGNOSIS — F90.2 ATTENTION DEFICIT HYPERACTIVITY DISORDER (ADHD), COMBINED TYPE: ICD-10-CM

## 2025-04-28 RX ORDER — DEXTROAMPHETAMINE SACCHARATE, AMPHETAMINE ASPARTATE, DEXTROAMPHETAMINE SULFATE AND AMPHETAMINE SULFATE 5; 5; 5; 5 MG/1; MG/1; MG/1; MG/1
20 TABLET ORAL 3 TIMES DAILY
Qty: 90 TABLET | Refills: 0 | Status: SHIPPED | OUTPATIENT
Start: 2025-04-28

## 2025-05-05 ENCOUNTER — PATIENT OUTREACH (OUTPATIENT)
Dept: CARE COORDINATION | Facility: CLINIC | Age: 25
End: 2025-05-05
Payer: COMMERCIAL

## 2025-06-13 ENCOUNTER — OFFICE VISIT (OUTPATIENT)
Dept: FAMILY MEDICINE | Facility: CLINIC | Age: 25
End: 2025-06-13
Payer: COMMERCIAL

## 2025-06-13 VITALS
TEMPERATURE: 98.7 F | SYSTOLIC BLOOD PRESSURE: 112 MMHG | HEART RATE: 94 BPM | HEIGHT: 62 IN | RESPIRATION RATE: 16 BRPM | BODY MASS INDEX: 21.97 KG/M2 | DIASTOLIC BLOOD PRESSURE: 78 MMHG | OXYGEN SATURATION: 100 % | WEIGHT: 119.38 LBS

## 2025-06-13 DIAGNOSIS — F90.2 ATTENTION DEFICIT HYPERACTIVITY DISORDER (ADHD), COMBINED TYPE: ICD-10-CM

## 2025-06-13 DIAGNOSIS — Z12.4 CERVICAL CANCER SCREENING: ICD-10-CM

## 2025-06-13 DIAGNOSIS — Z00.00 ROUTINE GENERAL MEDICAL EXAMINATION AT A HEALTH CARE FACILITY: Primary | ICD-10-CM

## 2025-06-13 PROCEDURE — 99395 PREV VISIT EST AGE 18-39: CPT | Performed by: FAMILY MEDICINE

## 2025-06-13 PROCEDURE — G0145 SCR C/V CYTO,THINLAYER,RESCR: HCPCS | Performed by: FAMILY MEDICINE

## 2025-06-13 RX ORDER — DEXTROAMPHETAMINE SACCHARATE, AMPHETAMINE ASPARTATE MONOHYDRATE, DEXTROAMPHETAMINE SULFATE AND AMPHETAMINE SULFATE 7.5; 7.5; 7.5; 7.5 MG/1; MG/1; MG/1; MG/1
30 CAPSULE, EXTENDED RELEASE ORAL 2 TIMES DAILY
Qty: 60 CAPSULE | Refills: 0 | Status: SHIPPED | OUTPATIENT
Start: 2025-06-13

## 2025-06-13 SDOH — HEALTH STABILITY: PHYSICAL HEALTH: ON AVERAGE, HOW MANY DAYS PER WEEK DO YOU ENGAGE IN MODERATE TO STRENUOUS EXERCISE (LIKE A BRISK WALK)?: 7 DAYS

## 2025-06-13 ASSESSMENT — SOCIAL DETERMINANTS OF HEALTH (SDOH): HOW OFTEN DO YOU GET TOGETHER WITH FRIENDS OR RELATIVES?: ONCE A WEEK

## 2025-06-13 ASSESSMENT — PATIENT HEALTH QUESTIONNAIRE - PHQ9
SUM OF ALL RESPONSES TO PHQ QUESTIONS 1-9: 14
10. IF YOU CHECKED OFF ANY PROBLEMS, HOW DIFFICULT HAVE THESE PROBLEMS MADE IT FOR YOU TO DO YOUR WORK, TAKE CARE OF THINGS AT HOME, OR GET ALONG WITH OTHER PEOPLE: VERY DIFFICULT
SUM OF ALL RESPONSES TO PHQ QUESTIONS 1-9: 14

## 2025-06-13 NOTE — PROGRESS NOTES
Preventive Care Visit  [facility] BIBI Tellez MD, Family Medicine  Jun 13, 2025      Assessment & Plan     Routine general medical examination at a health care facility    Attention deficit hyperactivity disorder (ADHD), combined type  refill  - amphetamine-dextroamphetamine (ADDERALL XR) 30 MG 24 hr capsule; Take 1 capsule (30 mg) by mouth 2 times daily.    Cervical cancer screening  - Pap Screen Reflex to HPV if ASCUS - Recommended Age 25 - 29 Years  - HPV Hold (Lab Only)    Patient has been advised of split billing requirements and indicates understanding: Yes        Counseling  Appropriate preventive services were addressed with this patient via screening, questionnaire, or discussion as appropriate for fall prevention, nutrition, physical activity, Tobacco-use cessation, social engagement, weight loss and cognition.  Checklist reviewing preventive services available has been given to the patient.  Reviewed patient's diet, addressing concerns and/or questions.   She is at risk for psychosocial distress and has been provided with information to reduce risk.   The patient's PHQ-9 score is consistent with moderate depression. She was provided with information regarding depression.           Giana Stout is a 25 year old, presenting for the following:  Physical (Pap smear due- not fasting for labs today- check IUD strings)        6/13/2025     1:41 PM   Additional Questions   Roomed by Audelia Varghese CMA   Accompanied by self          HPI  Girish is a pleasant 25-year-old female presenting for physical.  She is overall doing well.  She has no specific questions or concerns today.    She has a copper IUD and would like the strings checked.  She does admit that her menstrual cycles are a bit heavy but otherwise likes the copper IUD.        Advance Care Planning    Discussed advance care planning with patient; however, patient declined at this time.        6/13/2025   General  Health   How would you rate your overall physical health? Good   Feel stress (tense, anxious, or unable to sleep) Very much   (!) STRESS CONCERN      6/13/2025   Nutrition   Three or more servings of calcium each day? Yes   Diet: Regular (no restrictions)   How many servings of fruit and vegetables per day? (!) 0-1   How many sweetened beverages each day? 0-1         6/13/2025   Exercise   Days per week of moderate/strenous exercise 7 days         6/13/2025   Social Factors   Frequency of gathering with friends or relatives Once a week   Worry food won't last until get money to buy more No   Food not last or not have enough money for food? No   Do you have housing? (Housing is defined as stable permanent housing and does not include staying outside in a car, in a tent, in an abandoned building, in an overnight shelter, or couch-surfing.) Yes   Are you worried about losing your housing? Patient declined   Lack of transportation? No   Unable to get utilities (heat,electricity)? No         6/13/2025   Dental   Dentist two times every year? Yes       Today's PHQ-9 Score:       6/13/2025     1:34 PM   PHQ-9 SCORE   PHQ-9 Total Score MyChart 14 (Moderate depression)   PHQ-9 Total Score 14        Patient-reported         6/13/2025   Substance Use   Alcohol more than 3/day or more than 7/wk Not Applicable   Do you use any other substances recreationally? No     Social History     Tobacco Use    Smoking status: Never     Passive exposure: Yes    Smokeless tobacco: Never    Tobacco comments:     father smokes outside   Vaping Use    Vaping status: Former   Substance Use Topics    Alcohol use: Yes     Comment: occ.    Drug use: No          Mammogram Screening - Patient under 40 years of age: Routine Mammogram Screening not recommended.           6/13/2025   One time HIV Screening   Previous HIV test? I don't know         6/13/2025   STI Screening   New sexual partner(s) since last STI/HIV test? No     History of abnormal Pap  "smear: No - age 21-29 PAP every 3 years recommended        7/19/2022    10:16 AM   PAP / HPV   PAP Negative for Intraepithelial Lesion or Malignancy (NILM)            6/13/2025   Contraception/Family Planning   Questions about contraception or family planning No        Reviewed and updated as needed this visit by Provider                    History reviewed. No pertinent past medical history.  Past Surgical History:   Procedure Laterality Date    COLONOSCOPY N/A 11/10/2022    Procedure: COLONOSCOPY, WITH POLYPECTOMY AND BIOPSY;  Surgeon: Guera Willett DO;  Location: [facility] OR    COLONOSCOPY WITH CO2 INSUFFLATION N/A 11/10/2022    Procedure: COLONOSCOPY, WITH CO2 INSUFFLATION;  Surgeon: Guera Willett DO;  Location: [facility] OR    COMBINED ESOPHAGOSCOPY, GASTROSCOPY, DUODENOSCOPY (EGD) WITH CO2 INSUFFLATION N/A 3/18/2022    Procedure: ESOPHAGOGASTRODUODENOSCOPY, WITH CO2 INSUFFLATION;  Surgeon: Guera Willett DO;  Location: [facility] OR    MYRINGOTOMY, INSERT TUBE, COMBINED  5/16/2011    Procedure:COMBINED MYRINGOTOMY, INSERT TUBE; Surgeon:GERARDO MATUTE; Location:[facility]    MYRINGOTOMY, INSERT TUBE, COMBINED Left 9/10/2014    Procedure: COMBINED MYRINGOTOMY, INSERT TUBE;  Surgeon: Gerardo Matute MD;  Location: [facility]         Review of Systems  Constitutional, HEENT, cardiovascular, pulmonary, GI, , musculoskeletal, neuro, skin, endocrine and psych systems are negative, except as otherwise noted.     Objective    Exam  /78   Pulse 94   Temp 98.7  F (37.1  C) (Tympanic)   Resp 16   Ht 1.58 m (5' 2.21\")   Wt 54.1 kg (119 lb 6 oz)   LMP 05/23/2025 (Exact Date)   SpO2 100%   BMI 21.69 kg/m     Estimated body mass index is 21.69 kg/m  as calculated from the following:    Height as of this encounter: 1.58 m (5' 2.21\").    Weight as of this encounter: 54.1 kg (119 lb 6 oz).    Physical Exam    Physical Exam:  General Appearance: Alert, cooperative, no distress, appears stated age   Head: Normocephalic, " without obvious abnormality, atraumatic  Eyes: PERRL, conjunctiva/corneas clear, EOM's intact   Ears: Normal TM's and external ear canals, both ears  Nose:Nares normal, septum midline,mucosa normal, no drainage    Throat:Lips, mucosa, and tongue normal; teeth and gums normal  Neck: Supple, symmetrical, trachea midline, no adenopathy;  thyroid: not enlarged, symmetric, no tenderness/mass/nodules  Back: Symmetric, no curvature, ROM normal,  Lungs: Clear to auscultation bilaterally, respirations unlabored  Breasts: No breast masses, tenderness, asymmetry, or nipple discharge.  Heart: Regular rate and rhythm, S1 and S2 normal, no murmur, rub, or gallop  Abdomen: Soft, non-tender, bowel sounds active all four quadrants,  no masses, no organomegaly  Pelvic:normal external female genitalia, normal appearing vaginal mucosa and cervix, IUD strings seen  Extremities: Extremities normal, atraumatic, no cyanosis or edema  Skin: Skin color, texture, turgor normal, no rashes or lesions  Lymph nodes: Cervical, supraclavicular, and axillary nodes normal and   Neurologic: Normal         Signed Electronically by: Kerline Tellez MD

## 2025-06-13 NOTE — PATIENT INSTRUCTIONS
Patient Education   Preventive Care Advice   This is general advice given by our system to help you stay healthy. However, your care team may have specific advice just for you. Please talk to your care team about your preventive care needs.  Nutrition  Eat 5 or more servings of fruits and vegetables each day.  Try wheat bread, brown rice and whole grain pasta (instead of white bread, rice, and pasta).  Get enough calcium and vitamin D. Check the label on foods and aim for 100% of the RDA (recommended daily allowance).  Lifestyle  Exercise at least 150 minutes each week  (30 minutes a day, 5 days a week).  Do muscle strengthening activities 2 days a week. These help control your weight and prevent disease.  No smoking.  Wear sunscreen to prevent skin cancer.  Have a dental exam and cleaning every 6 months.  Yearly exams  See your health care team every year to talk about:  Any changes in your health.  Any medicines your care team has prescribed.  Preventive care, family planning, and ways to prevent chronic diseases.  Shots (vaccines)   HPV shots (up to age 26), if you've never had them before.  Hepatitis B shots (up to age 59), if you've never had them before.  COVID-19 shot: Get this shot when it's due.  Flu shot: Get a flu shot every year.  Tetanus shot: Get a tetanus shot every 10 years.  Pneumococcal, hepatitis A, and RSV shots: Ask your care team if you need these based on your risk.  Shingles shot (for age 50 and up)  General health tests  Diabetes screening:  Starting at age 35, Get screened for diabetes at least every 3 years.  If you are younger than age 35, ask your care team if you should be screened for diabetes.  Cholesterol test: At age 39, start having a cholesterol test every 5 years, or more often if advised.  Bone density scan (DEXA): At age 50, ask your care team if you should have this scan for osteoporosis (brittle bones).  Hepatitis C: Get tested at least once in your life.  STIs (sexually  transmitted infections)  Before age 24: Ask your care team if you should be screened for STIs.  After age 24: Get screened for STIs if you're at risk. You are at risk for STIs (including HIV) if:  You are sexually active with more than one person.  You don't use condoms every time.  You or a partner was diagnosed with a sexually transmitted infection.  If you are at risk for HIV, ask about PrEP medicine to prevent HIV.  Get tested for HIV at least once in your life, whether you are at risk for HIV or not.  Cancer screening tests  Cervical cancer screening: If you have a cervix, begin getting regular cervical cancer screening tests starting at age 21.  Breast cancer scan (mammogram): If you've ever had breasts, begin having regular mammograms starting at age 40. This is a scan to check for breast cancer.  Colon cancer screening: It is important to start screening for colon cancer at age 45.  Have a colonoscopy test every 10 years (or more often if you're at risk) Or, ask your provider about stool tests like a FIT test every year or Cologuard test every 3 years.  To learn more about your testing options, visit:   .  For help making a decision, visit:   https://bit.ly/oq55312.  Prostate cancer screening test: If you have a prostate, ask your care team if a prostate cancer screening test (PSA) at age 55 is right for you.  Lung cancer screening: If you are a current or former smoker ages 50 to 80, ask your care team if ongoing lung cancer screenings are right for you.  For informational purposes only. Not to replace the advice of your health care provider. Copyright   2023 [facility]. All rights reserved. Clinically reviewed by the [facility]. [employer] 014905 - REV 01/24.  Learning About Stress  What is stress?     Stress is your body's response to a hard situation. Your body can have a physical, emotional, or mental response. Stress is a fact of life for most people, and it  affects everyone differently. What causes stress for you may not be stressful for someone else.  A lot of things can cause stress. You may feel stress when you go on a job interview, take a test, or run a race. This kind of short-term stress is normal and even useful. It can help you if you need to work hard or react quickly. For example, stress can help you finish an important job on time.  Long-term stress is caused by ongoing stressful situations or events. Examples of long-term stress include long-term health problems, ongoing problems at work, or conflicts in your family. Long-term stress can harm your health.  How does stress affect your health?  When you are stressed, your body responds as though you are in danger. It makes hormones that speed up your heart, make you breathe faster, and give you a burst of energy. This is called the fight-or-flight stress response. If the stress is over quickly, your body goes back to normal and no harm is done.  But if stress happens too often or lasts too long, it can have bad effects. Long-term stress can make you more likely to get sick, and it can make symptoms of some diseases worse. If you tense up when you are stressed, you may develop neck, shoulder, or low back pain. Stress is linked to high blood pressure and heart disease.  Stress also harms your emotional health. It can make you holder, tense, or depressed. Your relationships may suffer, and you may not do well at work or school.  What can you do to manage stress?  You can try these things to help manage stress:   Do something active. Exercise or activity can help reduce stress. Walking is a great way to get started. Even everyday activities such as housecleaning or yard work can help.  Try yoga or citlaly chi. These techniques combine exercise and meditation. You may need some training at first to learn them.  Do something you enjoy. For example, listen to music or go to a movie. Practice your hobby or do volunteer  "work.  Meditate. This can help you relax, because you are not worrying about what happened before or what may happen in the future.  Do guided imagery. Imagine yourself in any setting that helps you feel calm. You can use online videos, books, or a teacher to guide you.  Do breathing exercises. For example:  From a standing position, bend forward from the waist with your knees slightly bent. Let your arms dangle close to the floor.  Breathe in slowly and deeply as you return to a standing position. Roll up slowly and lift your head last.  Hold your breath for just a few seconds in the standing position.  Breathe out slowly and bend forward from the waist.  Let your feelings out. Talk, laugh, cry, and express anger when you need to. Talking with supportive friends or family, a counselor, or a ralph leader about your feelings is a healthy way to relieve stress. Avoid discussing your feelings with people who make you feel worse.  Write. It may help to write about things that are bothering you. This helps you find out how much stress you feel and what is causing it. When you know this, you can find better ways to cope.  What can you do to prevent stress?  You might try some of these things to help prevent stress:  Manage your time. This helps you find time to do the things you want and need to do.  Get enough sleep. Your body recovers from the stresses of the day while you are sleeping.  Get support. Your family, friends, and community can make a difference in how you experience stress.  Limit your news feed. Avoid or limit time on social media or news that may make you feel stressed.  Do something active. Exercise or activity can help reduce stress. Walking is a great way to get started.  Where can you learn more?  Go to https://www.[employer].net/patiented  Enter N032 in the search box to learn more about \"Learning About Stress.\"  Current as of: October 24, 2024  Content Version: 14.5 2024-2025 [employer] [address] [employer].   Care instructions adapted under license by your healthcare professional. If you have questions about a medical condition or this instruction, always ask your healthcare professional. [employer] disclaims any warranty or liability for your use of this information.    Learning About Depression Screening  What is depression screening?  Depression screening is a way to see if you have depression symptoms. It may be done by a doctor or counselor. It's often part of a routine checkup. That's because your mental health is just as important as your physical health.  Depression is a mental health condition that affects how you feel, think, and act. You may:  Have less energy.  Lose interest in your daily activities.  Feel sad and grouchy for a long time.  Depression is very common. It affects people of all ages.  Many things can lead to depression. Some people become depressed after they have a stroke or find out they have a major illness like cancer or heart disease. The death of a loved one or a breakup may lead to depression. It can run in families. Most experts believe that a combination of inherited genes and stressful life events can cause it.  What happens during screening?  You may be asked to fill out a form about your depression symptoms. You and the doctor will discuss your answers. The doctor may ask you more questions to learn more about how you think, act, and feel.  What happens after screening?  If you have symptoms of depression, your doctor will talk to you about your options.  Doctors usually treat depression with medicines or counseling. Often, combining the two works best. Many people don't get help because they think that they'll get over the depression on their own. But people with depression may not get better unless they get treatment.  The cause of depression is not well understood. There may be many factors involved. But if you have depression, it's not your fault.  A serious  "symptom of depression is thinking about death or suicide. If you or someone you care about talks about this or about feeling hopeless, get help right away.  It's important to know that depression can be treated. Medicine, counseling, and self-care may help.  Where can you learn more?  Go to https://www.[employer].net/patiented  Enter T185 in the search box to learn more about \"Learning About Depression Screening.\"  Current as of: July 31, 2024  Content Version: 14.5    6934-7934 [employer].   Care instructions adapted under license by your healthcare professional. If you have questions about a medical condition or this instruction, always ask your healthcare professional. [employer] disclaims any warranty or liability for your use of this information.       "

## 2025-06-18 LAB
BKR LAB AP GYN ADEQUACY: NORMAL
BKR LAB AP GYN INTERPRETATION: NORMAL
BKR LAB AP HPV REFLEX: NORMAL
BKR LAB AP PREVIOUS ABNORMAL: NORMAL
PATH REPORT.COMMENTS IMP SPEC: NORMAL
PATH REPORT.COMMENTS IMP SPEC: NORMAL
PATH REPORT.RELEVANT HX SPEC: NORMAL

## 2025-06-19 ENCOUNTER — RESULTS FOLLOW-UP (OUTPATIENT)
Dept: OBGYN | Facility: CLINIC | Age: 25
End: 2025-06-19

## 2025-07-08 ENCOUNTER — MYC REFILL (OUTPATIENT)
Dept: FAMILY MEDICINE | Facility: CLINIC | Age: 25
End: 2025-07-08
Payer: COMMERCIAL

## 2025-07-08 DIAGNOSIS — F90.2 ATTENTION DEFICIT HYPERACTIVITY DISORDER (ADHD), COMBINED TYPE: ICD-10-CM

## 2025-07-09 ENCOUNTER — TELEPHONE (OUTPATIENT)
Dept: FAMILY MEDICINE | Facility: CLINIC | Age: 25
End: 2025-07-09
Payer: COMMERCIAL

## 2025-07-09 DIAGNOSIS — F90.2 ATTENTION DEFICIT HYPERACTIVITY DISORDER (ADHD), COMBINED TYPE: ICD-10-CM

## 2025-07-09 RX ORDER — DEXTROAMPHETAMINE SACCHARATE, AMPHETAMINE ASPARTATE, DEXTROAMPHETAMINE SULFATE AND AMPHETAMINE SULFATE 5; 5; 5; 5 MG/1; MG/1; MG/1; MG/1
20 TABLET ORAL 3 TIMES DAILY
Qty: 90 TABLET | Refills: 0 | Status: SHIPPED | OUTPATIENT
Start: 2025-07-09

## 2025-07-09 RX ORDER — DEXTROAMPHETAMINE SACCHARATE, AMPHETAMINE ASPARTATE, DEXTROAMPHETAMINE SULFATE AND AMPHETAMINE SULFATE 5; 5; 5; 5 MG/1; MG/1; MG/1; MG/1
20 TABLET ORAL 3 TIMES DAILY
Qty: 90 TABLET | Refills: 0 | Status: SHIPPED | OUTPATIENT
Start: 2025-07-09 | End: 2025-07-09

## 2025-07-09 NOTE — TELEPHONE ENCOUNTER
Marianna Tomlinson from [facility] reaching out regarding Girish Calderón's script for generic Adderall 20mg written 07/09/2025.    Girish's insurance will not cover her next fill of the Adderall 20mg until 07/12/2025 (Saturday) because the insurance has a quantity limitation of 180 every 75 days.    I spoke with the patient and she would like to have the prescription sent to other pharmacy (name/address below) as they are open on the weekend and [facility] is closed.    Desired Pharmacy:  [facility]  [address] [place] [address]  Phone:  [phone]  Fax:  [phone]    Sebastian,    Lashay DELACRUZ, [facility]  Phone: [phone]

## 2025-07-14 DIAGNOSIS — F90.2 ATTENTION DEFICIT HYPERACTIVITY DISORDER (ADHD), COMBINED TYPE: ICD-10-CM

## 2025-07-14 RX ORDER — DEXTROAMPHETAMINE SACCHARATE, AMPHETAMINE ASPARTATE, DEXTROAMPHETAMINE SULFATE AND AMPHETAMINE SULFATE 5; 5; 5; 5 MG/1; MG/1; MG/1; MG/1
20 TABLET ORAL 3 TIMES DAILY
Qty: 90 TABLET | Refills: 0 | Status: SHIPPED | OUTPATIENT
Start: 2025-07-14

## 2025-07-14 NOTE — TELEPHONE ENCOUNTER
Please send new script to [facility] before 9 pt works at 930. Also send back to us so we can call her to let her know its sent.    THank you

## 2025-07-14 NOTE — TELEPHONE ENCOUNTER
Marianna Tomlinson from [facility] reaching out.    Girish has informed us that she was not able to  her prescription from the [facility] pharmacy in [place], [place] during the weekend, seems like the prescription was not there when she checked. I apologize for the inconvenience but patient is requesting the prescription to be sent back to [facility] as [facility] is Closed until 9am and she needs to be in [place] before then.    Regards,    Lashay DELACRUZ, [facility]  Phone: [phone]

## 2025-07-15 DIAGNOSIS — F90.2 ATTENTION DEFICIT HYPERACTIVITY DISORDER (ADHD), COMBINED TYPE: ICD-10-CM

## 2025-07-16 ENCOUNTER — TELEPHONE (OUTPATIENT)
Dept: FAMILY MEDICINE | Facility: CLINIC | Age: 25
End: 2025-07-16
Payer: COMMERCIAL

## 2025-07-16 RX ORDER — DEXTROAMPHETAMINE SACCHARATE, AMPHETAMINE ASPARTATE MONOHYDRATE, DEXTROAMPHETAMINE SULFATE AND AMPHETAMINE SULFATE 7.5; 7.5; 7.5; 7.5 MG/1; MG/1; MG/1; MG/1
30 CAPSULE, EXTENDED RELEASE ORAL 2 TIMES DAILY
Qty: 60 CAPSULE | Refills: 0 | Status: SHIPPED | OUTPATIENT
Start: 2025-07-16

## 2025-07-21 NOTE — TELEPHONE ENCOUNTER
PA Initiation    Medication: AMPHETAMINE-DEXTROAMPHET ER 30 MG PO CP24  Insurance Company: [employer] - Phone [phone] Fax [phone]  Pharmacy Filling the Rx: [facility] - [address]  Filling Pharmacy Phone:    Filling Pharmacy Fax:    Start Date: 7/21/2025

## 2025-07-21 NOTE — TELEPHONE ENCOUNTER
Prior Authorization Approval    Medication: AMPHETAMINE-DEXTROAMPHET ER 30 MG PO CP24  Authorization Effective Date: 7/21/2025  Authorization Expiration Date: 7/20/2028  Approved Dose/Quantity: 60/30  Reference #: [id number]   Insurance Company: [employer] [phone] Fax [phone]  Expected CoPay: $    CoPay Card Available:      Financial Assistance Needed:   Which Pharmacy is filling the prescription: [facility] [place] [place] - [address]  Pharmacy Notified: yes  Patient Notified: yes

## 2025-08-11 ENCOUNTER — MYC REFILL (OUTPATIENT)
Dept: FAMILY MEDICINE | Facility: CLINIC | Age: 25
End: 2025-08-11
Payer: COMMERCIAL

## 2025-08-11 DIAGNOSIS — F90.2 ATTENTION DEFICIT HYPERACTIVITY DISORDER (ADHD), COMBINED TYPE: ICD-10-CM

## 2025-08-13 RX ORDER — DEXTROAMPHETAMINE SACCHARATE, AMPHETAMINE ASPARTATE, DEXTROAMPHETAMINE SULFATE AND AMPHETAMINE SULFATE 5; 5; 5; 5 MG/1; MG/1; MG/1; MG/1
20 TABLET ORAL 3 TIMES DAILY
Qty: 90 TABLET | Refills: 0 | Status: SHIPPED | OUTPATIENT
Start: 2025-08-13

## (undated) DEVICE — PAD CHUX UNDERPAD 23X24" 7136

## (undated) DEVICE — PREP CHLORAPREP 26ML TINTED ORANGE  260815

## (undated) DEVICE — KIT ENDO FIRST STEP DISINFECTANT 200ML W/POUCH EP-4

## (undated) RX ORDER — DIPHENHYDRAMINE HYDROCHLORIDE 50 MG/ML
INJECTION INTRAMUSCULAR; INTRAVENOUS
Status: DISPENSED
Start: 2022-03-18

## (undated) RX ORDER — FENTANYL CITRATE 50 UG/ML
INJECTION, SOLUTION INTRAMUSCULAR; INTRAVENOUS
Status: DISPENSED
Start: 2022-03-18